# Patient Record
Sex: MALE | Race: WHITE | NOT HISPANIC OR LATINO | Employment: FULL TIME | URBAN - METROPOLITAN AREA
[De-identification: names, ages, dates, MRNs, and addresses within clinical notes are randomized per-mention and may not be internally consistent; named-entity substitution may affect disease eponyms.]

---

## 2023-08-07 LAB
ALANINE AMINOTRANSFERASE (SGPT) (U/L) IN SER/PLAS: 16 U/L (ref 10–52)
ALBUMIN (G/DL) IN SER/PLAS: 4.4 G/DL (ref 3.4–5)
ALKALINE PHOSPHATASE (U/L) IN SER/PLAS: 48 U/L (ref 33–136)
ANION GAP IN SER/PLAS: 12 MMOL/L (ref 10–20)
ASPARTATE AMINOTRANSFERASE (SGOT) (U/L) IN SER/PLAS: 16 U/L (ref 9–39)
BILIRUBIN TOTAL (MG/DL) IN SER/PLAS: 0.5 MG/DL (ref 0–1.2)
CALCIUM (MG/DL) IN SER/PLAS: 9.4 MG/DL (ref 8.6–10.3)
CARBON DIOXIDE, TOTAL (MMOL/L) IN SER/PLAS: 27 MMOL/L (ref 21–32)
CHLORIDE (MMOL/L) IN SER/PLAS: 102 MMOL/L (ref 98–107)
CHOLESTEROL (MG/DL) IN SER/PLAS: 137 MG/DL (ref 0–199)
CHOLESTEROL IN HDL (MG/DL) IN SER/PLAS: 38.2 MG/DL
CHOLESTEROL/HDL RATIO: 3.6
CREATININE (MG/DL) IN SER/PLAS: 0.84 MG/DL (ref 0.5–1.3)
ERYTHROCYTE DISTRIBUTION WIDTH (RATIO) BY AUTOMATED COUNT: 12 % (ref 11.5–14.5)
ERYTHROCYTE MEAN CORPUSCULAR HEMOGLOBIN CONCENTRATION (G/DL) BY AUTOMATED: 32.1 G/DL (ref 32–36)
ERYTHROCYTE MEAN CORPUSCULAR VOLUME (FL) BY AUTOMATED COUNT: 95 FL (ref 80–100)
ERYTHROCYTES (10*6/UL) IN BLOOD BY AUTOMATED COUNT: 4.29 X10E12/L (ref 4.5–5.9)
GFR MALE: >90 ML/MIN/1.73M2
GLUCOSE (MG/DL) IN SER/PLAS: 150 MG/DL (ref 74–99)
HEMATOCRIT (%) IN BLOOD BY AUTOMATED COUNT: 40.8 % (ref 41–52)
HEMOGLOBIN (G/DL) IN BLOOD: 13.1 G/DL (ref 13.5–17.5)
LDL: 84 MG/DL (ref 0–99)
LEUKOCYTES (10*3/UL) IN BLOOD BY AUTOMATED COUNT: 6 X10E9/L (ref 4.4–11.3)
NRBC (PER 100 WBCS) BY AUTOMATED COUNT: 0 /100 WBC (ref 0–0)
PLATELETS (10*3/UL) IN BLOOD AUTOMATED COUNT: 243 X10E9/L (ref 150–450)
POTASSIUM (MMOL/L) IN SER/PLAS: 4.1 MMOL/L (ref 3.5–5.3)
PROSTATE SPECIFIC AG (NG/ML) IN SER/PLAS: 0.38 NG/ML (ref 0–4)
PROTEIN TOTAL: 7.1 G/DL (ref 6.4–8.2)
SODIUM (MMOL/L) IN SER/PLAS: 137 MMOL/L (ref 136–145)
THYROTROPIN (MIU/L) IN SER/PLAS BY DETECTION LIMIT <= 0.05 MIU/L: 2.98 MIU/L (ref 0.44–3.98)
TRIGLYCERIDE (MG/DL) IN SER/PLAS: 75 MG/DL (ref 0–149)
UREA NITROGEN (MG/DL) IN SER/PLAS: 22 MG/DL (ref 6–23)
VLDL: 15 MG/DL (ref 0–40)

## 2023-10-30 DIAGNOSIS — I65.23 BILATERAL CAROTID ARTERY STENOSIS: ICD-10-CM

## 2023-11-14 ENCOUNTER — APPOINTMENT (OUTPATIENT)
Dept: RADIOLOGY | Facility: CLINIC | Age: 73
End: 2023-11-14
Payer: COMMERCIAL

## 2023-11-14 ENCOUNTER — HOSPITAL ENCOUNTER (OUTPATIENT)
Dept: VASCULAR MEDICINE | Facility: HOSPITAL | Age: 73
Discharge: HOME | End: 2023-11-14
Payer: COMMERCIAL

## 2023-11-14 DIAGNOSIS — I65.22 OCCLUSION AND STENOSIS OF LEFT CAROTID ARTERY: ICD-10-CM

## 2023-11-14 DIAGNOSIS — I65.23 BILATERAL CAROTID ARTERY STENOSIS: ICD-10-CM

## 2023-11-14 PROCEDURE — 93880 EXTRACRANIAL BILAT STUDY: CPT | Performed by: SURGERY

## 2023-11-14 PROCEDURE — 93880 EXTRACRANIAL BILAT STUDY: CPT

## 2023-11-21 ENCOUNTER — OFFICE VISIT (OUTPATIENT)
Dept: VASCULAR SURGERY | Facility: CLINIC | Age: 73
End: 2023-11-21
Payer: COMMERCIAL

## 2023-11-21 VITALS — HEIGHT: 70 IN | HEART RATE: 76 BPM | BODY MASS INDEX: 27.2 KG/M2 | WEIGHT: 190 LBS | OXYGEN SATURATION: 95 %

## 2023-11-21 DIAGNOSIS — I65.23 BILATERAL CAROTID ARTERY STENOSIS: ICD-10-CM

## 2023-11-21 PROCEDURE — 1159F MED LIST DOCD IN RCRD: CPT | Performed by: SURGERY

## 2023-11-21 PROCEDURE — 1160F RVW MEDS BY RX/DR IN RCRD: CPT | Performed by: SURGERY

## 2023-11-21 PROCEDURE — 1036F TOBACCO NON-USER: CPT | Performed by: SURGERY

## 2023-11-21 PROCEDURE — 99213 OFFICE O/P EST LOW 20 MIN: CPT | Performed by: SURGERY

## 2023-11-21 RX ORDER — CLINDAMYCIN HYDROCHLORIDE 150 MG/1
450 CAPSULE ORAL 3 TIMES DAILY
COMMUNITY
Start: 2023-06-10

## 2023-11-21 RX ORDER — METFORMIN HYDROCHLORIDE 500 MG/1
500 TABLET ORAL 2 TIMES DAILY
COMMUNITY

## 2023-11-21 RX ORDER — FLUTICASONE PROPIONATE 50 MCG
2 SPRAY, SUSPENSION (ML) NASAL DAILY
COMMUNITY
Start: 2023-06-10

## 2023-11-21 RX ORDER — CEFUROXIME AXETIL 250 MG/1
250 TABLET ORAL 2 TIMES DAILY
COMMUNITY
Start: 2023-02-14

## 2023-11-21 RX ORDER — AMMONIUM LACTATE 12 G/100G
LOTION TOPICAL 2 TIMES DAILY
COMMUNITY
Start: 2023-09-16

## 2023-11-21 RX ORDER — NAPROXEN SODIUM 220 MG/1
1 TABLET, FILM COATED ORAL DAILY
COMMUNITY
Start: 2021-12-18

## 2023-11-21 RX ORDER — SERTRALINE HYDROCHLORIDE 25 MG/1
TABLET, FILM COATED ORAL
COMMUNITY

## 2023-11-21 RX ORDER — DOCUSATE SODIUM 100 MG/1
100 CAPSULE, LIQUID FILLED ORAL DAILY
COMMUNITY

## 2023-11-21 RX ORDER — ATORVASTATIN CALCIUM 40 MG/1
40 TABLET, FILM COATED ORAL DAILY
COMMUNITY

## 2023-11-21 RX ORDER — CLOPIDOGREL BISULFATE 75 MG/1
75 TABLET ORAL DAILY
COMMUNITY

## 2023-11-21 RX ORDER — SERTRALINE HYDROCHLORIDE 100 MG/1
100 TABLET, FILM COATED ORAL DAILY
COMMUNITY
Start: 2023-11-16

## 2023-11-21 RX ORDER — LISINOPRIL 20 MG/1
20 TABLET ORAL DAILY
COMMUNITY
Start: 2023-11-04

## 2023-11-21 RX ORDER — HYDRALAZINE HYDROCHLORIDE 50 MG/1
50 TABLET, FILM COATED ORAL 2 TIMES DAILY
COMMUNITY

## 2023-11-21 ASSESSMENT — ENCOUNTER SYMPTOMS
GASTROINTESTINAL NEGATIVE: 1
MUSCULOSKELETAL NEGATIVE: 1
CARDIOVASCULAR NEGATIVE: 1
HEMATOLOGIC/LYMPHATIC NEGATIVE: 1
ALLERGIC/IMMUNOLOGIC NEGATIVE: 1
CONSTITUTIONAL NEGATIVE: 1
ENDOCRINE NEGATIVE: 1
RESPIRATORY NEGATIVE: 1
PSYCHIATRIC NEGATIVE: 1
NEUROLOGICAL NEGATIVE: 1

## 2023-11-21 NOTE — PROGRESS NOTES
History Of Present Illness  Modesto Lorenz is a 73 y.o. male presenting for carotid follow-up.  He denies any lateralizing symptoms states that he has been doing well.  Recent carotid duplex reveals less than 50% carotid stenosis bilaterally.     Past Medical History  He has no past medical history on file.    Surgical History  He has a past surgical history that includes Other surgical history (12/23/2021); CT angio neck (12/14/2021); CT angio head w and wo IV contrast (12/14/2021); MR angio head wo IV contrast (12/13/2021); and MR angio neck wo IV contrast (12/13/2021).     Social History  He reports that he quit smoking about 2 years ago. His smoking use included cigarettes. He has quit using smokeless tobacco. No history on file for alcohol use and drug use.    Family History  No family history on file.     Allergies  Patient has no known allergies.    Review of Systems   Constitutional: Negative.    HENT: Negative.     Respiratory: Negative.     Cardiovascular: Negative.    Gastrointestinal: Negative.    Endocrine: Negative.    Genitourinary: Negative.    Musculoskeletal: Negative.    Allergic/Immunologic: Negative.    Neurological: Negative.    Hematological: Negative.    Psychiatric/Behavioral: Negative.          Physical Exam  Vitals reviewed.   Constitutional:       General: He is not in acute distress.     Appearance: Normal appearance. He is normal weight.   HENT:      Head: Normocephalic and atraumatic.   Eyes:      Extraocular Movements: Extraocular movements intact.      Conjunctiva/sclera: Conjunctivae normal.      Pupils: Pupils are equal, round, and reactive to light.   Neck:      Vascular: No carotid bruit.   Cardiovascular:      Rate and Rhythm: Normal rate and regular rhythm.      Pulses: Normal pulses.      Heart sounds: Normal heart sounds.   Pulmonary:      Effort: Pulmonary effort is normal.      Breath sounds: Normal breath sounds.   Abdominal:      General: Abdomen is flat. Bowel sounds are  "normal.      Palpations: Abdomen is soft.   Musculoskeletal:         General: No swelling or tenderness. Normal range of motion.      Cervical back: Normal range of motion and neck supple. No tenderness.      Right lower le+ Edema present.      Left lower le+ Edema present.   Skin:     General: Skin is warm and dry.      Capillary Refill: Capillary refill takes less than 2 seconds.   Neurological:      General: No focal deficit present.      Mental Status: He is alert and oriented to person, place, and time.      Cranial Nerves: No cranial nerve deficit.      Sensory: No sensory deficit.      Motor: No weakness.   Psychiatric:         Mood and Affect: Mood normal.         Behavior: Behavior normal.          Last Recorded Vitals  Pulse 76, height 1.778 m (5' 10\"), weight 86.2 kg (190 lb), SpO2 95 %.    Relevant Results      Current Outpatient Medications:     ammonium lactate (Lac-Hydrin) 12 % lotion, Apply topically 2 times a day. to affected area, Disp: , Rfl:     aspirin 81 mg chewable tablet, Chew 1 tablet (81 mg) once daily., Disp: , Rfl:     cefuroxime (Ceftin) 250 mg tablet, Take 1 tablet (250 mg) by mouth 2 times a day., Disp: , Rfl:     clindamycin (Cleocin) 150 mg capsule, Take 3 capsules (450 mg) by mouth 3 times a day., Disp: , Rfl:     fluticasone (Flonase) 50 mcg/actuation nasal spray, Administer 2 sprays into each nostril once daily., Disp: , Rfl:     lisinopril 20 mg tablet, Take 1 tablet (20 mg) by mouth once daily., Disp: , Rfl:     sertraline (Zoloft) 100 mg tablet, Take 1 tablet (100 mg) by mouth once daily., Disp: , Rfl:     atorvastatin (Lipitor) 40 mg tablet, Take 1 tablet (40 mg) by mouth once daily., Disp: , Rfl:     clopidogrel (Plavix) 75 mg tablet, Take 1 tablet (75 mg) by mouth once daily., Disp: , Rfl:     docusate sodium (Colace) 100 mg capsule, Take 1 capsule (100 mg) by mouth once daily., Disp: , Rfl:     hydrALAZINE (Apresoline) 50 mg tablet, Take 1 tablet (50 mg) by mouth 2 " times a day., Disp: , Rfl:     metFORMIN (Glucophage) 500 mg tablet, Take 1 tablet (500 mg) by mouth 2 times a day., Disp: , Rfl:     sertraline (Zoloft) 25 mg tablet, Take by mouth., Disp: , Rfl:      Vascular US carotid artery duplex bilateral    Result Date: 11/15/2023           [facility] [address] [place], [place] [address] Tel [phone] and Fax [phone]  Vascular Lab Report [facility] US CAROTID ARTERY DUPLEX BILATERAL  Patient Name:      TANIA Angelo Physician:  [id number] Stefani Tompkins MD, RPVI Study Date:        11/14/2023           Ordering Physician: [id number] TRACI HURTADO MRN/PID:           [id number]             Technologist:       Diana Lim RVT Accession#:        [id number]         Technologist 2: Date of Birth/Age: 1950 / 73 years Encounter#:         [id number] Gender:            M Admission Status:  Outpatient           Location Performed: [facility]  Diagnosis/ICD: Occlusion and stenosis of left carotid artery-I65.22 CPT Codes:     95790 Cerebrovascular Carotid Duplex scan complete  Patient History Patient underwent Left CEA.  CONCLUSIONS: Right Carotid: Findings are consistent with less than 50% stenosis of the right proximal internal carotid artery. Laminar flow seen by color Doppler. Right external carotid artery appears patent with no evidence of stenosis. No evidence of hemodynamically significant stenosis of the right common carotid artery. The right vertebral artery is patent with antegrade flow. No evidence of hemodynamically significant stenosis in the right subclavian artery. Left Carotid: Findings are consistent with less than 50% stenosis of the left proximal internal carotid artery. Laminar flow seen by color Doppler. Left external carotid artery appears patent with no evidence of stenosis. No evidence of hemodynamically  significant stenosis of the left common carotid artery. The left vertebral artery is patent with antegrade flow. No evidence of hemodynamically significant stenosis in the left subclavian artery. Endarterectomy: Patent left carotid endarterectomy site following endarterectomy.  Comparison: Compared with study from 2/23/2023, no significant change.  Imaging & Doppler Findings: Right Plaque Morph: The proximal right internal carotid artery demonstrates heterogenous plaque. The distal right common carotid artery demonstrates heterogenous plaque. Left Plaque Morph: The proximal left internal carotid artery demonstrates intimal thickening plaque. The distal left common carotid artery demonstrates intimal thickening and heterogenous plaque.   Right                        Left   PSV      EDV                PSV      EDV 89 cm/s            CCA P    60 cm/s 73 cm/s            CCA D    72 cm/s 55 cm/s  13 cm/s   ICA P    67 cm/s  13 cm/s 63 cm/s  19 cm/s   ICA M    50 cm/s  16 cm/s 69 cm/s  25 cm/s   ICA D    74 cm/s  23 cm/s 127 cm/s            ECA     158 cm/s 108 cm/s         Vertebral  23 cm/s 202 cm/s         Subclavian 172 cm/s               Right Left ICA/CCA Ratio  0.8  0.9   [id number] Stefani Tompkins MD, RPVI Electronically signed by [id number] Stefani Tompkins MD, RPSHANE on 11/15/2023 at 10:26:53 PM  ** Final **        Assessment/Plan   Diagnoses and all orders for this visit:  Bilateral carotid artery stenosis      74yo male presenting for carotid follow-up.  He denies lateralizing symptoms and no focal deficits are noted on exam.  Recent carotid duplex study reveals less than 50% stenosis bilaterally.  I have recommended that he continue medical management with aspirin, statin, and Plavix.  He is to follow-up in 1 year with repeat carotid duplex.       I spent 20 minutes in the professional and overall care of this patient.      Sunshine Lofton MD

## 2024-08-10 ENCOUNTER — HOSPITAL ENCOUNTER (EMERGENCY)
Facility: HOSPITAL | Age: 74
Discharge: HOME | End: 2024-08-10
Payer: MEDICARE

## 2024-08-10 ENCOUNTER — APPOINTMENT (OUTPATIENT)
Dept: RADIOLOGY | Facility: HOSPITAL | Age: 74
End: 2024-08-10
Payer: MEDICARE

## 2024-08-10 VITALS
HEIGHT: 70 IN | RESPIRATION RATE: 18 BRPM | HEART RATE: 85 BPM | BODY MASS INDEX: 27.2 KG/M2 | DIASTOLIC BLOOD PRESSURE: 70 MMHG | WEIGHT: 190 LBS | OXYGEN SATURATION: 95 % | SYSTOLIC BLOOD PRESSURE: 139 MMHG | TEMPERATURE: 96.8 F

## 2024-08-10 DIAGNOSIS — L03.90 CELLULITIS, UNSPECIFIED CELLULITIS SITE: Primary | ICD-10-CM

## 2024-08-10 DIAGNOSIS — I80.8 SUPERFICIAL THROMBOPHLEBITIS OF LEFT UPPER EXTREMITY: ICD-10-CM

## 2024-08-10 LAB
ALBUMIN SERPL BCP-MCNC: 4.1 G/DL (ref 3.4–5)
ALP SERPL-CCNC: 65 U/L (ref 33–136)
ALT SERPL W P-5'-P-CCNC: 10 U/L (ref 10–52)
ANION GAP SERPL CALC-SCNC: 13 MMOL/L (ref 10–20)
AST SERPL W P-5'-P-CCNC: 12 U/L (ref 9–39)
BASOPHILS # BLD AUTO: 0.06 X10*3/UL (ref 0–0.1)
BASOPHILS NFR BLD AUTO: 0.7 %
BILIRUB SERPL-MCNC: 0.6 MG/DL (ref 0–1.2)
BUN SERPL-MCNC: 11 MG/DL (ref 6–23)
CALCIUM SERPL-MCNC: 9.6 MG/DL (ref 8.6–10.3)
CHLORIDE SERPL-SCNC: 100 MMOL/L (ref 98–107)
CO2 SERPL-SCNC: 27 MMOL/L (ref 21–32)
CREAT SERPL-MCNC: 0.73 MG/DL (ref 0.5–1.3)
EGFRCR SERPLBLD CKD-EPI 2021: >90 ML/MIN/1.73M*2
EOSINOPHIL # BLD AUTO: 0.3 X10*3/UL (ref 0–0.4)
EOSINOPHIL NFR BLD AUTO: 3.6 %
ERYTHROCYTE [DISTWIDTH] IN BLOOD BY AUTOMATED COUNT: 12.7 % (ref 11.5–14.5)
GLUCOSE SERPL-MCNC: 180 MG/DL (ref 74–99)
HCT VFR BLD AUTO: 39.6 % (ref 41–52)
HGB BLD-MCNC: 13 G/DL (ref 13.5–17.5)
IMM GRANULOCYTES # BLD AUTO: 0.03 X10*3/UL (ref 0–0.5)
IMM GRANULOCYTES NFR BLD AUTO: 0.4 % (ref 0–0.9)
LYMPHOCYTES # BLD AUTO: 1.24 X10*3/UL (ref 0.8–3)
LYMPHOCYTES NFR BLD AUTO: 14.7 %
MCH RBC QN AUTO: 31.6 PG (ref 26–34)
MCHC RBC AUTO-ENTMCNC: 32.8 G/DL (ref 32–36)
MCV RBC AUTO: 96 FL (ref 80–100)
MONOCYTES # BLD AUTO: 0.68 X10*3/UL (ref 0.05–0.8)
MONOCYTES NFR BLD AUTO: 8.1 %
NEUTROPHILS # BLD AUTO: 6.1 X10*3/UL (ref 1.6–5.5)
NEUTROPHILS NFR BLD AUTO: 72.5 %
NRBC BLD-RTO: 0 /100 WBCS (ref 0–0)
PLATELET # BLD AUTO: 272 X10*3/UL (ref 150–450)
POTASSIUM SERPL-SCNC: 4.4 MMOL/L (ref 3.5–5.3)
PROT SERPL-MCNC: 7.5 G/DL (ref 6.4–8.2)
RBC # BLD AUTO: 4.11 X10*6/UL (ref 4.5–5.9)
SODIUM SERPL-SCNC: 136 MMOL/L (ref 136–145)
WBC # BLD AUTO: 8.4 X10*3/UL (ref 4.4–11.3)

## 2024-08-10 PROCEDURE — 80053 COMPREHEN METABOLIC PANEL: CPT | Performed by: PHYSICIAN ASSISTANT

## 2024-08-10 PROCEDURE — 99284 EMERGENCY DEPT VISIT MOD MDM: CPT | Mod: 25

## 2024-08-10 PROCEDURE — 85025 COMPLETE CBC W/AUTO DIFF WBC: CPT | Performed by: PHYSICIAN ASSISTANT

## 2024-08-10 PROCEDURE — 36415 COLL VENOUS BLD VENIPUNCTURE: CPT | Performed by: PHYSICIAN ASSISTANT

## 2024-08-10 PROCEDURE — 93971 EXTREMITY STUDY: CPT | Performed by: STUDENT IN AN ORGANIZED HEALTH CARE EDUCATION/TRAINING PROGRAM

## 2024-08-10 PROCEDURE — 93971 EXTREMITY STUDY: CPT

## 2024-08-10 RX ORDER — CEPHALEXIN 500 MG/1
500 CAPSULE ORAL 4 TIMES DAILY
Qty: 28 CAPSULE | Refills: 0 | Status: SHIPPED | OUTPATIENT
Start: 2024-08-10 | End: 2024-08-17

## 2024-08-10 ASSESSMENT — LIFESTYLE VARIABLES
TOTAL SCORE: 0
HAVE PEOPLE ANNOYED YOU BY CRITICIZING YOUR DRINKING: NO
HAVE YOU EVER FELT YOU SHOULD CUT DOWN ON YOUR DRINKING: NO
EVER HAD A DRINK FIRST THING IN THE MORNING TO STEADY YOUR NERVES TO GET RID OF A HANGOVER: NO
EVER FELT BAD OR GUILTY ABOUT YOUR DRINKING: NO

## 2024-08-10 ASSESSMENT — COLUMBIA-SUICIDE SEVERITY RATING SCALE - C-SSRS
6. HAVE YOU EVER DONE ANYTHING, STARTED TO DO ANYTHING, OR PREPARED TO DO ANYTHING TO END YOUR LIFE?: NO
1. IN THE PAST MONTH, HAVE YOU WISHED YOU WERE DEAD OR WISHED YOU COULD GO TO SLEEP AND NOT WAKE UP?: NO
2. HAVE YOU ACTUALLY HAD ANY THOUGHTS OF KILLING YOURSELF?: NO

## 2024-08-10 NOTE — ED PROVIDER NOTES
HPI   Chief Complaint   Patient presents with    Hand Pain       73-year-old left-hand-dominant male presents complaining of atraumatic pain and swelling to the left upper extremity.  Patient reports symptoms for the past 1 to 2 days.  He also reports some mild erythema associated with the pain and swelling.  He denies any fevers.  He reports full range of motion denies any weakness or paresthesias throughout the left upper extremity.  No reports of recent trauma or fall.              Patient History   No past medical history on file.  Past Surgical History:   Procedure Laterality Date    CT ANGIO NECK  12/14/2021    CT NECK ANGIO W AND WO IV CONTRAST 12/14/2021 [facility] CLINICAL LEGACY    CT HEAD ANGIO W AND WO IV CONTRAST  12/14/2021    CT HEAD ANGIO W AND WO IV CONTRAST 12/14/2021 [facility] CLINICAL LEGACY    MR HEAD ANGIO WO IV CONTRAST  12/13/2021    MR HEAD ANGIO WO IV CONTRAST 12/13/2021 [facility] CLINICAL LEGACY    MR NECK ANGIO WO IV CONTRAST  12/13/2021    MR NECK ANGIO WO IV CONTRAST 12/13/2021 [facility] CLINICAL LEGACY    OTHER SURGICAL HISTORY  12/23/2021    Carotid thromboendarterectomy     No family history on file.  Social History     Tobacco Use    Smoking status: Former     Current packs/day: 0.00     Types: Cigarettes     Quit date: 2021     Years since quitting: 3.6    Smokeless tobacco: Former   Substance Use Topics    Alcohol use: Not on file    Drug use: Not on file       Physical Exam   ED Triage Vitals [08/10/24 1817]   Temperature Heart Rate Respirations BP   36 °C (96.8 °F) 85 18 139/70      Pulse Ox Temp src Heart Rate Source Patient Position   95 % -- -- --      BP Location FiO2 (%)     -- --       Physical Exam  Vitals and nursing note reviewed.   Constitutional:       General: He is not in acute distress.     Appearance: Normal appearance. He is normal weight. He is not ill-appearing, toxic-appearing or diaphoretic.   HENT:      Head: Normocephalic.      Nose: Nose normal.      Mouth/Throat:      Mouth:  Mucous membranes are moist.   Eyes:      Extraocular Movements: Extraocular movements intact.      Conjunctiva/sclera: Conjunctivae normal.   Cardiovascular:      Rate and Rhythm: Normal rate and regular rhythm.      Pulses: Normal pulses.   Pulmonary:      Effort: Pulmonary effort is normal. No respiratory distress.      Breath sounds: Normal breath sounds.   Abdominal:      General: Abdomen is flat. There is no distension.      Palpations: Abdomen is soft.      Tenderness: There is no abdominal tenderness. There is no guarding or rebound.   Musculoskeletal:         General: Normal range of motion.      Cervical back: Normal range of motion and neck supple.      Comments: Asymmetrical swelling to the left upper extremity in comparison to the right.  The patient's left upper extremity is neurovascularly intact throughout with full range of motion soft compartments and easily palpable distal pulses.  Cap refill remains brisk.  Patient has full range of motion throughout the left upper extremity   Skin:     General: Skin is warm and dry.      Capillary Refill: Capillary refill takes less than 2 seconds.      Comments: Erythema associated with the left wrist and forearm.  The erythema itself is not circumferential and is predominantly on the lateral aspect.  It is slightly warm to touch in comparison to the contralateral side.  There is no lymphangitic streaking.  No induration or fluctuance appreciated.   Neurological:      General: No focal deficit present.      Mental Status: He is alert and oriented to person, place, and time.   Psychiatric:         Mood and Affect: Mood normal.         Behavior: Behavior normal.         Thought Content: Thought content normal.         Judgment: Judgment normal.           ED Course & [facility]   ED Course as of 08/10/24 2130   Sat Aug 10, 2024   2130 IMPRESSION:  No evidence of acute DVT left upper extremity.  Basilic vein superficial thrombophlebitis.   [DS]      ED Course User  Index  [DS] Gage Ramirez PA-C                 No data recorded                                 Medical Decision Making  Basic lab work was obtained along with ultrasonography.  Workup revealed no evidence of acute DVT left upper extremity. Basilic vein superficial thrombophlebitis was noted.  Patient failure notified of the findings.  Recommended warm compresses along with Tylenol.  Patient was found to have more encompassing erythema which may be associated with an early cellulitis.  He will be placed on Keflex.  Recommended close follow-up with his primary care doctor          Procedure  Procedures     Gage Ramirez PA-C  08/10/24 0662

## 2024-08-13 ENCOUNTER — HOSPITAL ENCOUNTER (OUTPATIENT)
Dept: RADIOLOGY | Facility: HOSPITAL | Age: 74
Discharge: HOME | End: 2024-08-13
Payer: MEDICARE

## 2024-08-13 DIAGNOSIS — J20.9 ACUTE BRONCHITIS, UNSPECIFIED: ICD-10-CM

## 2024-08-13 PROCEDURE — 71046 X-RAY EXAM CHEST 2 VIEWS: CPT

## 2025-06-04 ENCOUNTER — APPOINTMENT (OUTPATIENT)
Dept: RADIOLOGY | Facility: HOSPITAL | Age: 75
End: 2025-06-04
Payer: MEDICARE

## 2025-06-04 ENCOUNTER — APPOINTMENT (OUTPATIENT)
Dept: CARDIOLOGY | Facility: HOSPITAL | Age: 75
End: 2025-06-04
Payer: MEDICARE

## 2025-06-04 ENCOUNTER — HOSPITAL ENCOUNTER (OUTPATIENT)
Facility: HOSPITAL | Age: 75
Setting detail: OBSERVATION
Discharge: HOME | End: 2025-06-06
Attending: STUDENT IN AN ORGANIZED HEALTH CARE EDUCATION/TRAINING PROGRAM | Admitting: INTERNAL MEDICINE
Payer: MEDICARE

## 2025-06-04 DIAGNOSIS — R47.01 APHASIA: ICD-10-CM

## 2025-06-04 DIAGNOSIS — R73.9 HYPERGLYCEMIA: ICD-10-CM

## 2025-06-04 DIAGNOSIS — E86.0 DEHYDRATION: ICD-10-CM

## 2025-06-04 DIAGNOSIS — E87.1 HYPONATREMIA: Primary | ICD-10-CM

## 2025-06-04 DIAGNOSIS — E87.5 HYPERKALEMIA: ICD-10-CM

## 2025-06-04 DIAGNOSIS — N17.9 AKI (ACUTE KIDNEY INJURY): ICD-10-CM

## 2025-06-04 PROBLEM — R41.82 ALTERED MENTAL STATUS: Status: ACTIVE | Noted: 2025-06-04

## 2025-06-04 LAB
ALBUMIN SERPL BCP-MCNC: 4.4 G/DL (ref 3.4–5)
ALP SERPL-CCNC: 92 U/L (ref 33–136)
ALT SERPL W P-5'-P-CCNC: 16 U/L (ref 10–52)
ANION GAP SERPL CALC-SCNC: 19 MMOL/L (ref 10–20)
AST SERPL W P-5'-P-CCNC: 15 U/L (ref 9–39)
B-OH-BUTYR SERPL-SCNC: 0.23 MMOL/L (ref 0.02–0.27)
BASOPHILS # BLD AUTO: 0.09 X10*3/UL (ref 0–0.1)
BASOPHILS NFR BLD AUTO: 1 %
BILIRUB SERPL-MCNC: 0.6 MG/DL (ref 0–1.2)
BUN SERPL-MCNC: 29 MG/DL (ref 6–23)
CALCIUM SERPL-MCNC: 10.1 MG/DL (ref 8.6–10.3)
CARDIAC TROPONIN I PNL SERPL HS: 16 NG/L (ref 0–20)
CHLORIDE SERPL-SCNC: 92 MMOL/L (ref 98–107)
CO2 SERPL-SCNC: 22 MMOL/L (ref 21–32)
CREAT SERPL-MCNC: 1.72 MG/DL (ref 0.5–1.3)
EGFRCR SERPLBLD CKD-EPI 2021: 41 ML/MIN/1.73M*2
EOSINOPHIL # BLD AUTO: 0.17 X10*3/UL (ref 0–0.4)
EOSINOPHIL NFR BLD AUTO: 1.9 %
ERYTHROCYTE [DISTWIDTH] IN BLOOD BY AUTOMATED COUNT: 12.5 % (ref 11.5–14.5)
GLUCOSE BLD MANUAL STRIP-MCNC: 183 MG/DL (ref 74–99)
GLUCOSE SERPL-MCNC: 290 MG/DL (ref 74–99)
HCT VFR BLD AUTO: 44 % (ref 41–52)
HGB BLD-MCNC: 14.5 G/DL (ref 13.5–17.5)
IMM GRANULOCYTES # BLD AUTO: 0.03 X10*3/UL (ref 0–0.5)
IMM GRANULOCYTES NFR BLD AUTO: 0.3 % (ref 0–0.9)
LYMPHOCYTES # BLD AUTO: 1.37 X10*3/UL (ref 0.8–3)
LYMPHOCYTES NFR BLD AUTO: 15.1 %
MAGNESIUM SERPL-MCNC: 1.74 MG/DL (ref 1.6–2.4)
MCH RBC QN AUTO: 30.7 PG (ref 26–34)
MCHC RBC AUTO-ENTMCNC: 33 G/DL (ref 32–36)
MCV RBC AUTO: 93 FL (ref 80–100)
MONOCYTES # BLD AUTO: 0.82 X10*3/UL (ref 0.05–0.8)
MONOCYTES NFR BLD AUTO: 9.1 %
NEUTROPHILS # BLD AUTO: 6.57 X10*3/UL (ref 1.6–5.5)
NEUTROPHILS NFR BLD AUTO: 72.6 %
NRBC BLD-RTO: 0 /100 WBCS (ref 0–0)
PLATELET # BLD AUTO: 323 X10*3/UL (ref 150–450)
POTASSIUM SERPL-SCNC: 5.4 MMOL/L (ref 3.5–5.3)
PROT SERPL-MCNC: 7.4 G/DL (ref 6.4–8.2)
RBC # BLD AUTO: 4.72 X10*6/UL (ref 4.5–5.9)
SODIUM SERPL-SCNC: 128 MMOL/L (ref 136–145)
WBC # BLD AUTO: 9.1 X10*3/UL (ref 4.4–11.3)

## 2025-06-04 PROCEDURE — 96361 HYDRATE IV INFUSION ADD-ON: CPT

## 2025-06-04 PROCEDURE — 71045 X-RAY EXAM CHEST 1 VIEW: CPT | Mod: FOREIGN READ | Performed by: RADIOLOGY

## 2025-06-04 PROCEDURE — 36415 COLL VENOUS BLD VENIPUNCTURE: CPT | Performed by: STUDENT IN AN ORGANIZED HEALTH CARE EDUCATION/TRAINING PROGRAM

## 2025-06-04 PROCEDURE — 93005 ELECTROCARDIOGRAM TRACING: CPT

## 2025-06-04 PROCEDURE — 80053 COMPREHEN METABOLIC PANEL: CPT | Performed by: STUDENT IN AN ORGANIZED HEALTH CARE EDUCATION/TRAINING PROGRAM

## 2025-06-04 PROCEDURE — G0378 HOSPITAL OBSERVATION PER HR: HCPCS

## 2025-06-04 PROCEDURE — 96360 HYDRATION IV INFUSION INIT: CPT | Mod: 59

## 2025-06-04 PROCEDURE — 82947 ASSAY GLUCOSE BLOOD QUANT: CPT | Performed by: STUDENT IN AN ORGANIZED HEALTH CARE EDUCATION/TRAINING PROGRAM

## 2025-06-04 PROCEDURE — 72125 CT NECK SPINE W/O DYE: CPT | Performed by: RADIOLOGY

## 2025-06-04 PROCEDURE — 82947 ASSAY GLUCOSE BLOOD QUANT: CPT

## 2025-06-04 PROCEDURE — 96372 THER/PROPH/DIAG INJ SC/IM: CPT

## 2025-06-04 PROCEDURE — 85025 COMPLETE CBC W/AUTO DIFF WBC: CPT | Performed by: STUDENT IN AN ORGANIZED HEALTH CARE EDUCATION/TRAINING PROGRAM

## 2025-06-04 PROCEDURE — 84132 ASSAY OF SERUM POTASSIUM: CPT | Performed by: STUDENT IN AN ORGANIZED HEALTH CARE EDUCATION/TRAINING PROGRAM

## 2025-06-04 PROCEDURE — 70450 CT HEAD/BRAIN W/O DYE: CPT | Performed by: RADIOLOGY

## 2025-06-04 PROCEDURE — 99285 EMERGENCY DEPT VISIT HI MDM: CPT | Mod: 25 | Performed by: STUDENT IN AN ORGANIZED HEALTH CARE EDUCATION/TRAINING PROGRAM

## 2025-06-04 PROCEDURE — 72125 CT NECK SPINE W/O DYE: CPT

## 2025-06-04 PROCEDURE — 70450 CT HEAD/BRAIN W/O DYE: CPT

## 2025-06-04 PROCEDURE — 2500000004 HC RX 250 GENERAL PHARMACY W/ HCPCS (ALT 636 FOR OP/ED): Performed by: STUDENT IN AN ORGANIZED HEALTH CARE EDUCATION/TRAINING PROGRAM

## 2025-06-04 PROCEDURE — 83735 ASSAY OF MAGNESIUM: CPT | Performed by: STUDENT IN AN ORGANIZED HEALTH CARE EDUCATION/TRAINING PROGRAM

## 2025-06-04 PROCEDURE — 2500000004 HC RX 250 GENERAL PHARMACY W/ HCPCS (ALT 636 FOR OP/ED)

## 2025-06-04 PROCEDURE — 99223 1ST HOSP IP/OBS HIGH 75: CPT

## 2025-06-04 PROCEDURE — 2500000001 HC RX 250 WO HCPCS SELF ADMINISTERED DRUGS (ALT 637 FOR MEDICARE OP)

## 2025-06-04 PROCEDURE — 71045 X-RAY EXAM CHEST 1 VIEW: CPT

## 2025-06-04 PROCEDURE — 82010 KETONE BODYS QUAN: CPT | Performed by: STUDENT IN AN ORGANIZED HEALTH CARE EDUCATION/TRAINING PROGRAM

## 2025-06-04 PROCEDURE — 84484 ASSAY OF TROPONIN QUANT: CPT | Performed by: STUDENT IN AN ORGANIZED HEALTH CARE EDUCATION/TRAINING PROGRAM

## 2025-06-04 RX ORDER — DULOXETIN HYDROCHLORIDE 60 MG/1
60 CAPSULE, DELAYED RELEASE ORAL DAILY
COMMUNITY

## 2025-06-04 RX ORDER — POLYETHYLENE GLYCOL 3350 17 G/17G
17 POWDER, FOR SOLUTION ORAL DAILY
Status: DISCONTINUED | OUTPATIENT
Start: 2025-06-05 | End: 2025-06-06 | Stop reason: HOSPADM

## 2025-06-04 RX ORDER — FUROSEMIDE 20 MG/1
20 TABLET ORAL DAILY
COMMUNITY
End: 2025-06-06 | Stop reason: HOSPADM

## 2025-06-04 RX ORDER — ASPIRIN 81 MG/1
81 TABLET ORAL DAILY
Status: DISCONTINUED | OUTPATIENT
Start: 2025-06-05 | End: 2025-06-06 | Stop reason: HOSPADM

## 2025-06-04 RX ORDER — ACETAMINOPHEN 500 MG
5 TABLET ORAL ONCE
Status: COMPLETED | OUTPATIENT
Start: 2025-06-04 | End: 2025-06-04

## 2025-06-04 RX ORDER — SODIUM CHLORIDE, SODIUM LACTATE, POTASSIUM CHLORIDE, CALCIUM CHLORIDE 600; 310; 30; 20 MG/100ML; MG/100ML; MG/100ML; MG/100ML
75 INJECTION, SOLUTION INTRAVENOUS CONTINUOUS
Status: ACTIVE | OUTPATIENT
Start: 2025-06-04 | End: 2025-06-05

## 2025-06-04 RX ORDER — INSULIN LISPRO 100 [IU]/ML
0-10 INJECTION, SOLUTION INTRAVENOUS; SUBCUTANEOUS
Status: DISCONTINUED | OUTPATIENT
Start: 2025-06-05 | End: 2025-06-06 | Stop reason: HOSPADM

## 2025-06-04 RX ORDER — CLOPIDOGREL BISULFATE 75 MG/1
75 TABLET ORAL DAILY
Status: DISCONTINUED | OUTPATIENT
Start: 2025-06-05 | End: 2025-06-06 | Stop reason: HOSPADM

## 2025-06-04 RX ORDER — HEPARIN SODIUM 5000 [USP'U]/ML
5000 INJECTION, SOLUTION INTRAVENOUS; SUBCUTANEOUS EVERY 8 HOURS SCHEDULED
Status: DISCONTINUED | OUTPATIENT
Start: 2025-06-04 | End: 2025-06-06 | Stop reason: HOSPADM

## 2025-06-04 RX ORDER — ATORVASTATIN CALCIUM 40 MG/1
40 TABLET, FILM COATED ORAL NIGHTLY
Status: DISCONTINUED | OUTPATIENT
Start: 2025-06-04 | End: 2025-06-05

## 2025-06-04 RX ORDER — LABETALOL HYDROCHLORIDE 5 MG/ML
10 INJECTION, SOLUTION INTRAVENOUS EVERY 10 MIN PRN
Status: DISCONTINUED | OUTPATIENT
Start: 2025-06-04 | End: 2025-06-06 | Stop reason: HOSPADM

## 2025-06-04 RX ORDER — ACETAMINOPHEN 325 MG/1
650 TABLET ORAL EVERY 4 HOURS PRN
Status: DISCONTINUED | OUTPATIENT
Start: 2025-06-04 | End: 2025-06-06 | Stop reason: HOSPADM

## 2025-06-04 RX ORDER — DEXTROSE 50 % IN WATER (D50W) INTRAVENOUS SYRINGE
12.5
Status: DISCONTINUED | OUTPATIENT
Start: 2025-06-04 | End: 2025-06-06 | Stop reason: HOSPADM

## 2025-06-04 RX ORDER — DEXTROSE 50 % IN WATER (D50W) INTRAVENOUS SYRINGE
25
Status: DISCONTINUED | OUTPATIENT
Start: 2025-06-04 | End: 2025-06-06 | Stop reason: HOSPADM

## 2025-06-04 RX ADMIN — ATORVASTATIN CALCIUM 40 MG: 40 TABLET, FILM COATED ORAL at 22:15

## 2025-06-04 RX ADMIN — HEPARIN SODIUM 5000 UNITS: 5000 INJECTION, SOLUTION INTRAVENOUS; SUBCUTANEOUS at 22:15

## 2025-06-04 RX ADMIN — Medication 5 MG: at 23:13

## 2025-06-04 RX ADMIN — SODIUM CHLORIDE, SODIUM LACTATE, POTASSIUM CHLORIDE, AND CALCIUM CHLORIDE 1000 ML: .6; .31; .03; .02 INJECTION, SOLUTION INTRAVENOUS at 18:56

## 2025-06-04 RX ADMIN — SODIUM CHLORIDE, SODIUM LACTATE, POTASSIUM CHLORIDE, AND CALCIUM CHLORIDE 1000 ML: .6; .31; .03; .02 INJECTION, SOLUTION INTRAVENOUS at 19:18

## 2025-06-04 RX ADMIN — SODIUM CHLORIDE, SODIUM LACTATE, POTASSIUM CHLORIDE, AND CALCIUM CHLORIDE 75 ML/HR: .6; .31; .03; .02 INJECTION, SOLUTION INTRAVENOUS at 22:19

## 2025-06-04 SDOH — SOCIAL STABILITY: SOCIAL INSECURITY: HAVE YOU HAD THOUGHTS OF HARMING ANYONE ELSE?: NO

## 2025-06-04 SDOH — SOCIAL STABILITY: SOCIAL INSECURITY: DO YOU FEEL ANYONE HAS EXPLOITED OR TAKEN ADVANTAGE OF YOU FINANCIALLY OR OF YOUR PERSONAL PROPERTY?: NO

## 2025-06-04 SDOH — SOCIAL STABILITY: SOCIAL INSECURITY
WITHIN THE LAST YEAR, HAVE YOU BEEN KICKED, HIT, SLAPPED, OR OTHERWISE PHYSICALLY HURT BY YOUR PARTNER OR EX-PARTNER?: NO

## 2025-06-04 SDOH — SOCIAL STABILITY: SOCIAL INSECURITY: ARE YOU OR HAVE YOU BEEN THREATENED OR ABUSED PHYSICALLY, EMOTIONALLY, OR SEXUALLY BY ANYONE?: NO

## 2025-06-04 SDOH — ECONOMIC STABILITY: INCOME INSECURITY: IN THE PAST 12 MONTHS HAS THE ELECTRIC, GAS, OIL, OR WATER COMPANY THREATENED TO SHUT OFF SERVICES IN YOUR HOME?: NO

## 2025-06-04 SDOH — SOCIAL STABILITY: SOCIAL INSECURITY: WITHIN THE LAST YEAR, HAVE YOU BEEN HUMILIATED OR EMOTIONALLY ABUSED IN OTHER WAYS BY YOUR PARTNER OR EX-PARTNER?: NO

## 2025-06-04 SDOH — SOCIAL STABILITY: SOCIAL INSECURITY: WITHIN THE LAST YEAR, HAVE YOU BEEN AFRAID OF YOUR PARTNER OR EX-PARTNER?: NO

## 2025-06-04 SDOH — ECONOMIC STABILITY: FOOD INSECURITY: WITHIN THE PAST 12 MONTHS, YOU WORRIED THAT YOUR FOOD WOULD RUN OUT BEFORE YOU GOT THE MONEY TO BUY MORE.: NEVER TRUE

## 2025-06-04 SDOH — SOCIAL STABILITY: SOCIAL INSECURITY
WITHIN THE LAST YEAR, HAVE YOU BEEN RAPED OR FORCED TO HAVE ANY KIND OF SEXUAL ACTIVITY BY YOUR PARTNER OR EX-PARTNER?: NO

## 2025-06-04 SDOH — SOCIAL STABILITY: SOCIAL INSECURITY: ARE THERE ANY APPARENT SIGNS OF INJURIES/BEHAVIORS THAT COULD BE RELATED TO ABUSE/NEGLECT?: NO

## 2025-06-04 SDOH — SOCIAL STABILITY: SOCIAL INSECURITY: HAVE YOU HAD ANY THOUGHTS OF HARMING ANYONE ELSE?: NO

## 2025-06-04 SDOH — SOCIAL STABILITY: SOCIAL INSECURITY: HAS ANYONE EVER THREATENED TO HURT YOUR FAMILY OR YOUR PETS?: NO

## 2025-06-04 SDOH — ECONOMIC STABILITY: FOOD INSECURITY: WITHIN THE PAST 12 MONTHS, THE FOOD YOU BOUGHT JUST DIDN'T LAST AND YOU DIDN'T HAVE MONEY TO GET MORE.: NEVER TRUE

## 2025-06-04 SDOH — SOCIAL STABILITY: SOCIAL INSECURITY: DO YOU FEEL UNSAFE GOING BACK TO THE PLACE WHERE YOU ARE LIVING?: NO

## 2025-06-04 SDOH — SOCIAL STABILITY: SOCIAL INSECURITY: WERE YOU ABLE TO COMPLETE ALL THE BEHAVIORAL HEALTH SCREENINGS?: YES

## 2025-06-04 SDOH — SOCIAL STABILITY: SOCIAL INSECURITY: DOES ANYONE TRY TO KEEP YOU FROM HAVING/CONTACTING OTHER FRIENDS OR DOING THINGS OUTSIDE YOUR HOME?: NO

## 2025-06-04 SDOH — SOCIAL STABILITY: SOCIAL INSECURITY: ABUSE: ADULT

## 2025-06-04 ASSESSMENT — PATIENT HEALTH QUESTIONNAIRE - PHQ9
SUM OF ALL RESPONSES TO PHQ9 QUESTIONS 1 & 2: 0
2. FEELING DOWN, DEPRESSED OR HOPELESS: NOT AT ALL
1. LITTLE INTEREST OR PLEASURE IN DOING THINGS: NOT AT ALL

## 2025-06-04 ASSESSMENT — COGNITIVE AND FUNCTIONAL STATUS - GENERAL
MOBILITY SCORE: 23
DAILY ACTIVITIY SCORE: 24
CLIMB 3 TO 5 STEPS WITH RAILING: A LITTLE
PATIENT BASELINE BEDBOUND: NO

## 2025-06-04 ASSESSMENT — ACTIVITIES OF DAILY LIVING (ADL)
DRESSING YOURSELF: INDEPENDENT
PATIENT'S MEMORY ADEQUATE TO SAFELY COMPLETE DAILY ACTIVITIES?: YES
JUDGMENT_ADEQUATE_SAFELY_COMPLETE_DAILY_ACTIVITIES: YES
GROOMING: INDEPENDENT
HEARING - LEFT EAR: DIFFICULTY WITH NOISE
WALKS IN HOME: INDEPENDENT
HEARING - RIGHT EAR: DIFFICULTY WITH NOISE
TOILETING: INDEPENDENT
BATHING: INDEPENDENT
FEEDING YOURSELF: INDEPENDENT
LACK_OF_TRANSPORTATION: NO
ADEQUATE_TO_COMPLETE_ADL: YES

## 2025-06-04 ASSESSMENT — LIFESTYLE VARIABLES
AUDIT-C TOTAL SCORE: 0
AUDIT-C TOTAL SCORE: 0
SKIP TO QUESTIONS 9-10: 1
HOW MANY STANDARD DRINKS CONTAINING ALCOHOL DO YOU HAVE ON A TYPICAL DAY: PATIENT DOES NOT DRINK
HOW OFTEN DO YOU HAVE 6 OR MORE DRINKS ON ONE OCCASION: NEVER
HOW OFTEN DO YOU HAVE A DRINK CONTAINING ALCOHOL: NEVER

## 2025-06-04 ASSESSMENT — PAIN - FUNCTIONAL ASSESSMENT: PAIN_FUNCTIONAL_ASSESSMENT: 0-10

## 2025-06-04 ASSESSMENT — PAIN SCALES - GENERAL: PAINLEVEL_OUTOF10: 0 - NO PAIN

## 2025-06-04 NOTE — ED PROVIDER NOTES
EMERGENCY DEPARTMENT ENCOUNTER      Pt Name: Modesto Lorenz  MRN: [id number]  Birthdate 1950  Date of evaluation: 6/4/2025  Provider: Reggie Orellana DO    CHIEF COMPLAINT       Chief Complaint   Patient presents with    Hyperglycemia       HISTORY OF PRESENT ILLNESS    Modesto Lorenz is a 74 y.o. male who presents to the emergency department with EMS due to altered mental status found outside.  Patient reports that he was working doing yard work while in the heat.  He states he was trying to stay hydrated as best he could.  Reportedly spouse called EMS as patient seemed to be confused.  Patient is alert and oriented to person time place at this time.  He does not recall the events between working outside and EMS arrival.  en route patient was found to be hyperglycemic with glucose in the high 300s.  He does have a history of diabetes.  Has been compliant with all of his medications.  He has dried blood under his chin although does not know where this came from.  No further related symptoms at this time.      .    Nursing Notes were reviewed.    REVIEW OF SYSTEMS     CONSTITUTIONAL: Denies fever, sweats, chills.   NEURO: Endorses confusion.  Denies difficulty walking, numbness, weakness, tingling, headache.   HEENT: Denies sore throat, rhinorrhea, changes in vision.   CARDIO: Denies chest pain, palpitations.  PULM: Denies shortness of breath, cough.   GI: Denies abdominal pain, nausea, vomiting, diarrhea, constipation, melena, hematochezia.  : Denies painful urination, frequency, hematuria.   MSK: Denies recent trauma.   SKIN: Denies rash, lesions.   ENDOCRINE: Endorses diabetes.  HEME: Denies bleeding disorder.     PAST MEDICAL HISTORY   Medical History[1]    SURGICAL HISTORY     Surgical History[2]    ALLERGIES     Patient has no known allergies.    FAMILY HISTORY     Family History[3]     SOCIAL HISTORY     Social History[4]    PHYSICAL EXAM   VS: As documented in the triage note from today's date and EMR  flowsheet were reviewed.  Gen: Well developed. No acute distress. Seated in bed. Appears nontoxic.  GCS 15.  Skin: Warm. Dry. Intact. No rashes or lesions.  Eyes: Pupils equally round and reactive to light. Clear sclera. EOMI.  HENT: Atraumatic appearance. Mucosal membranes moist. No oral lesions, uvula midline, airway patent.   CV: Regular rate and regular rhythm. S1, S2. No pedal edema. Warm extremities.  Resp: Nonlabored breathing Clear to auscultation bilaterally. No increased work of breathing.   GI: Soft and nontender. No rebound or guarding. Bowel sounds x4 present.   MSK: Symmetric muscle bulk. No joint swelling in the extremities. Compartments are soft. Neurovascularly intact x4 extremities. Radial pulses +2 equal bilaterally.  Pedal pulses +2 equal bilaterally  Neuro: Alert. CN II - XII intact. Speech fluent. Moving all extremities. No focal deficits. Gait normal.  NIH 0.  Van negative.  Psych: Appropriate. Kempt.    DIAGNOSTIC RESULTS   RADIOLOGY:   Non-plain film images such as CT, Ultrasound and MRI are read by the radiologist. Plain radiographic images are visualized and preliminarily interpreted by the emergency physician with the below findings: Chest x-ray no pneumothorax or pneumonia.      Interpretation per the Radiologist below, if available at the time of this note:    CT head wo IV contrast   Final Result   CT HEAD:   1. No acute intracranial abnormality or calvarial fracture.             CT CERVICAL SPINE:   1. No acute fracture or traumatic malalignment of the cervical spine.        MACRO:   None        Signed by: Scout Castillo 6/4/2025 7:23 PM   Dictation workstation:   [id number]      CT cervical spine wo IV contrast   Final Result   CT HEAD:   1. No acute intracranial abnormality or calvarial fracture.             CT CERVICAL SPINE:   1. No acute fracture or traumatic malalignment of the cervical spine.        MACRO:   None        Signed by: Scout Castillo 6/4/2025 7:23 PM   Dictation  workstation:   [id number]      XR chest 1 view   Final Result   No acute process.   Signed by Heri Bowling MD            ED BEDSIDE ULTRASOUND:   Performed by ED Physician - none    LABS:  Labs Reviewed   CBC WITH AUTO DIFFERENTIAL - Abnormal       Result Value    WBC 9.1      nRBC 0.0      RBC 4.72      Hemoglobin 14.5      Hematocrit 44.0      MCV 93      MCH 30.7      MCHC 33.0      RDW 12.5      Platelets 323      Neutrophils % 72.6      Immature Granulocytes %, Automated 0.3      Lymphocytes % 15.1      Monocytes % 9.1      Eosinophils % 1.9      Basophils % 1.0      Neutrophils Absolute 6.57 (*)     Immature Granulocytes Absolute, Automated 0.03      Lymphocytes Absolute 1.37      Monocytes Absolute 0.82 (*)     Eosinophils Absolute 0.17      Basophils Absolute 0.09     COMPREHENSIVE METABOLIC PANEL - Abnormal    Glucose 290 (*)     Sodium 128 (*)     Potassium 5.4 (*)     Chloride 92 (*)     Bicarbonate 22      Anion Gap 19      Urea Nitrogen 29 (*)     Creatinine 1.72 (*)     eGFR 41 (*)     Calcium 10.1      Albumin 4.4      Alkaline Phosphatase 92      Total Protein 7.4      AST 15      Bilirubin, Total 0.6      ALT 16     MAGNESIUM - Normal    Magnesium 1.74     BETA HYDROXYBUTYRATE - Normal    Beta-Hydroxybutyrate 0.23      Narrative:     The beta-hydroxybutyrate test performance characteristics have been validated by  [facility]. This test has not been approved by the FDA; however,such approval is not necessary.       TROPONIN I, HIGH SENSITIVITY - Normal    Troponin I, High Sensitivity 16      Narrative:     Less than 99th percentile of normal range cutoff-  Female and children under 18 years old <14 ng/L; Male <21 ng/L: Negative  Repeat testing should be performed if clinically indicated.     Female and children under 18 years old 14-50 ng/L; Male 21-50 ng/L:  Consistent with possible cardiac damage and possible increased clinical   risk. Serial  measurements may help to assess extent of myocardial damage.     >50 ng/L: Consistent with cardiac damage, increased clinical risk and  myocardial infarction. Serial measurements may help assess extent of   myocardial damage.      NOTE: Children less than 1 year old may have higher baseline troponin   levels and results should be interpreted in conjunction with the overall   clinical context.     NOTE: Troponin I testing is performed using a different   testing methodology at [facility] than at other   [facility]. Direct result comparisons should only   be made within the same method.   URINALYSIS WITH REFLEX CULTURE AND MICROSCOPIC    Narrative:     The following orders were created for panel order Urinalysis with Reflex Culture and Microscopic.  Procedure                               Abnormality         Status                     ---------                               -----------         ------                     Urinalysis with Reflex C...[[id number]]                                                 Extra Urine Gray Tube[[id number]]                                                         Please view results for these tests on the individual orders.   BLOOD GAS VENOUS FULL PANEL   URINALYSIS WITH REFLEX CULTURE AND MICROSCOPIC   EXTRA URINE GRAY TUBE       All other labs were within normal range or not returned as of this dictation.    EMERGENCY DEPARTMENT COURSE/MDM:   Vitals:    Vitals:    06/04/25 1915 06/04/25 1921 06/04/25 2000 06/04/25 2030   BP:  112/59 141/70    Pulse: 82 82 66 82   Resp: 18 18  18   Temp:       TempSrc:       SpO2: 96% 95% 97% 98%   Weight:       Height:           I reviewed the patient's triage vitals and they are hemodynamically stable    Due to the above findings the following was ordered basic labs include EKG CT imaging the head and neck and fluid bolus.    Lab work shows no leukocytosis make infectious etiology less likely no signs of anemia either.  Patient has  multiple electrolyte derangements hyperkalemia hyponatremia LUDA.  I do suspect this is likely secondary to dehydration will give a total of 2 L of LR.  Patient is resting comfortably in medically stable.  CT imaging of the head and neck is negative for any acute injury cranial findings or fracture.  Chest x-ray is clear.  EKG without acute injury pattern no runs of dysrhythmias cardiac troponin within normal range making ACS less likely.  I did have a thorough discussion with the patient's primary physician who is agreed to accept the patient he did take over care at time of admission order.  Patient is appreciative of care agreeable with plan for admission for suspected dehydration.    ED Course as of 06/04/25 2044 Wed Jun 04, 2025 1849 Interpreted by the Emergency Department Attending: ECG revealed normal sinus rhythm first-degree AV block at a rate of 87 beats per minute with AL interval 314 , QRS of 132 , QTc of 428.  No acute injury pattern. Previous EKG on December 12 revealed nonspecific changes.    [MG]      ED Course User Index  [MG] Reggie Orellana DO         Diagnoses as of 06/04/25 2044   Hyponatremia   Hyperkalemia   LUDA (acute kidney injury)       Patient was counseled regarding labs, imaging, likely diagnosis, and plan. All questions were answered.     ------------------------------------------------------------------  Information provided by the patient and spouse  Consults PCP  Past medical history complicating workup diabetes  Previous medical records reviewed office visit 11/21/2023  Considered hyperkalemic cocktail although suspect secondary to dehydration no EKG changes  Shared medical decision making patient agreeable with hospital admission for continued workup and treatment.  ------------------------------------------------------------------  ED Medications administered this visit:    Medications   lactated Ringer's bolus 1,000 mL (0 mL intravenous Stopped 6/4/25 2041)   lactated  Ringer's bolus 1,000 mL (0 mL intravenous Stopped 25)       Final Impression:   1. Hyponatremia    2. Hyperkalemia    3. LUDA (acute kidney injury)          Reggie Orellana DO    (Please note that portions of this note were completed with a voice recognition program.  Efforts were made to edit the dictations but occasionally words are mis-transcribed.)         [1] No past medical history on file.  [2]   Past Surgical History:  Procedure Laterality Date    CT ANGIO NECK  2021    CT NECK ANGIO W AND WO IV CONTRAST 2021 [facility] CLINICAL LEGACY    CT HEAD ANGIO W AND WO IV CONTRAST  2021    CT HEAD ANGIO W AND WO IV CONTRAST 2021 [facility] CLINICAL LEGAC[facility]    MR HEAD ANGIO WO IV CONTRAST  2021    MR HEAD ANGIO WO IV CONTRAST 2021 [facility] CLINICAL LEGACY    MR NECK ANGIO WO IV CONTRAST  2021    MR NECK ANGIO WO IV CONTRAST 2021 [facility] CLINICAL LEGACY    OTHER SURGICAL HISTORY  2021    Carotid thromboendarterectomy   [3] No family history on file.  [4]   Social History  Socioeconomic History    Marital status:    Tobacco Use    Smoking status: Former     Current packs/day: 0.00     Types: Cigarettes     Quit date:      Years since quittin.4    Smokeless tobacco: Former        Reggie Orellana DO  25

## 2025-06-04 NOTE — ED TRIAGE NOTES
Pt to ED via EMS from home due to hyperglycemia. Per EMS, pt has hx of stroke and diabetes and wife was concerned due to pt stuttering at home. Per EMS, blood sugar 375 while en route to [facility].

## 2025-06-05 ENCOUNTER — APPOINTMENT (OUTPATIENT)
Dept: RADIOLOGY | Facility: HOSPITAL | Age: 75
End: 2025-06-05
Payer: MEDICARE

## 2025-06-05 ENCOUNTER — APPOINTMENT (OUTPATIENT)
Dept: CARDIOLOGY | Facility: HOSPITAL | Age: 75
End: 2025-06-05
Payer: MEDICARE

## 2025-06-05 LAB
ANION GAP BLDV CALCULATED.4IONS-SCNC: 16 MMOL/L (ref 10–25)
ANION GAP SERPL CALC-SCNC: 12 MMOL/L (ref 10–20)
AORTIC VALVE MEAN GRADIENT: 2 MMHG
AORTIC VALVE PEAK VELOCITY: 0.92 M/S
APPEARANCE UR: CLEAR
APTT PPP: 30 SECONDS (ref 26–36)
ATRIAL RATE: 87 BPM
AV PEAK GRADIENT: 3 MMHG
AVA (PEAK VEL): 3.45 CM2
AVA (VTI): 2.84 CM2
BASE EXCESS BLDV CALC-SCNC: -0.2 MMOL/L (ref -2–3)
BILIRUB UR STRIP.AUTO-MCNC: NEGATIVE MG/DL
BNP SERPL-MCNC: 45 PG/ML (ref 0–99)
BODY TEMPERATURE: 37 DEGREES CELSIUS
BUN SERPL-MCNC: 26 MG/DL (ref 6–23)
CA-I BLDV-SCNC: 1.14 MMOL/L (ref 1.1–1.33)
CALCIUM SERPL-MCNC: 9.5 MG/DL (ref 8.6–10.3)
CHLORIDE BLDV-SCNC: 92 MMOL/L (ref 98–107)
CHLORIDE SERPL-SCNC: 97 MMOL/L (ref 98–107)
CHLORIDE UR-SCNC: 34 MMOL/L
CHLORIDE/CREATININE (MMOL/G) IN URINE: 102 MMOL/G CREAT (ref 23–275)
CHOLEST SERPL-MCNC: 193 MG/DL (ref 0–199)
CHOLESTEROL/HDL RATIO: 6.1
CO2 SERPL-SCNC: 29 MMOL/L (ref 21–32)
COLOR UR: ABNORMAL
CREAT SERPL-MCNC: 1.21 MG/DL (ref 0.5–1.3)
CREAT UR-MCNC: 33.4 MG/DL (ref 20–370)
CREAT UR-MCNC: 46.7 MG/DL (ref 20–370)
CRITICAL CALL TIME: 1837
CRITICAL CALLED BY: ABNORMAL
CRITICAL CALLED TO: ABNORMAL
CRITICAL READ BACK: ABNORMAL
EGFRCR SERPLBLD CKD-EPI 2021: 63 ML/MIN/1.73M*2
EJECTION FRACTION APICAL 4 CHAMBER: 53
EJECTION FRACTION: 58 %
ERYTHROCYTE [DISTWIDTH] IN BLOOD BY AUTOMATED COUNT: 12.6 % (ref 11.5–14.5)
EST. AVERAGE GLUCOSE BLD GHB EST-MCNC: 292 MG/DL
GLUCOSE BLD MANUAL STRIP-MCNC: 160 MG/DL (ref 74–99)
GLUCOSE BLD MANUAL STRIP-MCNC: 184 MG/DL (ref 74–99)
GLUCOSE BLD MANUAL STRIP-MCNC: 197 MG/DL (ref 74–99)
GLUCOSE BLD MANUAL STRIP-MCNC: 224 MG/DL (ref 74–99)
GLUCOSE BLDV-MCNC: 319 MG/DL (ref 74–99)
GLUCOSE SERPL-MCNC: 200 MG/DL (ref 74–99)
GLUCOSE UR STRIP.AUTO-MCNC: ABNORMAL MG/DL
HBA1C MFR BLD: 11.8 % (ref ?–5.7)
HCO3 BLDV-SCNC: 22.8 MMOL/L (ref 22–26)
HCT VFR BLD AUTO: 39.9 % (ref 41–52)
HCT VFR BLD EST: 45 % (ref 41–52)
HDLC SERPL-MCNC: 31.6 MG/DL
HGB BLD-MCNC: 12.8 G/DL (ref 13.5–17.5)
HGB BLDV-MCNC: 15.1 G/DL (ref 13.5–17.5)
HOLD SPECIMEN: 293
INHALED O2 CONCENTRATION: 21 %
INR PPP: 1 (ref 0.9–1.1)
KETONES UR STRIP.AUTO-MCNC: NEGATIVE MG/DL
LACTATE BLDV-SCNC: 4.6 MMOL/L (ref 0.4–2)
LDLC SERPL CALC-MCNC: 121 MG/DL
LEFT ATRIUM VOLUME AREA LENGTH INDEX BSA: 14.9 ML/M2
LEFT VENTRICLE INTERNAL DIMENSION DIASTOLE: 3.1 CM (ref 3.5–6)
LEFT VENTRICULAR OUTFLOW TRACT DIAMETER: 2.2 CM
LEUKOCYTE ESTERASE UR QL STRIP.AUTO: NEGATIVE
MCH RBC QN AUTO: 30.3 PG (ref 26–34)
MCHC RBC AUTO-ENTMCNC: 32.1 G/DL (ref 32–36)
MCV RBC AUTO: 95 FL (ref 80–100)
MICROALBUMIN UR-MCNC: <7 MG/L
MICROALBUMIN/CREAT UR: NORMAL MG/G{CREAT}
MITRAL VALVE E/A RATIO: 0.67
NITRITE UR QL STRIP.AUTO: NEGATIVE
NON HDL CHOLESTEROL: 161 MG/DL (ref 0–149)
NRBC BLD-RTO: 0 /100 WBCS (ref 0–0)
OSMOLALITY SERPL: 292 MOSM/KG (ref 280–300)
OSMOLALITY UR: 284 MOSM/KG (ref 200–1200)
OXYHGB MFR BLDV: 82.7 % (ref 45–75)
P AXIS: -6 DEGREES
P OFFSET: 127 MS
P ONSET: 54 MS
PCO2 BLDV: 32 MM HG (ref 41–51)
PH BLDV: 7.46 PH (ref 7.33–7.43)
PH UR STRIP.AUTO: 6.5 [PH]
PLATELET # BLD AUTO: 254 X10*3/UL (ref 150–450)
PO2 BLDV: 50 MM HG (ref 35–45)
POTASSIUM BLDV-SCNC: 5.4 MMOL/L (ref 3.5–5.3)
POTASSIUM SERPL-SCNC: 4.5 MMOL/L (ref 3.5–5.3)
POTASSIUM UR-SCNC: 24 MMOL/L
POTASSIUM/CREAT UR-RTO: 72 MMOL/G CREAT
PR INTERVAL: 314 MS
PROT UR STRIP.AUTO-MCNC: NEGATIVE MG/DL
PROTHROMBIN TIME: 11.1 SECONDS (ref 9.8–12.4)
Q ONSET: 211 MS
QRS COUNT: 15 BEATS
QRS DURATION: 132 MS
QT INTERVAL: 356 MS
QTC CALCULATION(BAZETT): 428 MS
QTC FREDERICIA: 402 MS
R AXIS: -72 DEGREES
RBC # BLD AUTO: 4.22 X10*6/UL (ref 4.5–5.9)
RBC # UR STRIP.AUTO: NEGATIVE MG/DL
RIGHT VENTRICLE FREE WALL PEAK S': 12.9 CM/S
SAO2 % BLDV: 85 % (ref 45–75)
SODIUM BLDV-SCNC: 125 MMOL/L (ref 136–145)
SODIUM SERPL-SCNC: 133 MMOL/L (ref 136–145)
SODIUM UR-SCNC: 44 MMOL/L
SODIUM UR-SCNC: 50 MMOL/L
SODIUM/CREAT UR-RTO: 150 MMOL/G CREAT
SODIUM/CREAT UR-RTO: 94 MMOL/G CREAT
SP GR UR STRIP.AUTO: 1.01
T AXIS: -6 DEGREES
T OFFSET: 389 MS
TRICUSPID ANNULAR PLANE SYSTOLIC EXCURSION: 1.8 CM
TRIGL SERPL-MCNC: 202 MG/DL (ref 0–149)
TSH SERPL-ACNC: 1.73 MIU/L (ref 0.44–3.98)
UREA/CREAT UR-SRTO: 11.3 G/G CREAT
UROBILINOGEN UR STRIP.AUTO-MCNC: NORMAL MG/DL
UUN UR-MCNC: 379 MG/DL
VENTRICULAR RATE: 87 BPM
VLDL: 40 MG/DL (ref 0–40)
WBC # BLD AUTO: 5.1 X10*3/UL (ref 4.4–11.3)

## 2025-06-05 PROCEDURE — 83036 HEMOGLOBIN GLYCOSYLATED A1C: CPT | Mod: PARLAB

## 2025-06-05 PROCEDURE — 82374 ASSAY BLOOD CARBON DIOXIDE: CPT

## 2025-06-05 PROCEDURE — 2500000002 HC RX 250 W HCPCS SELF ADMINISTERED DRUGS (ALT 637 FOR MEDICARE OP, ALT 636 FOR OP/ED): Performed by: NURSE PRACTITIONER

## 2025-06-05 PROCEDURE — 2500000002 HC RX 250 W HCPCS SELF ADMINISTERED DRUGS (ALT 637 FOR MEDICARE OP, ALT 636 FOR OP/ED)

## 2025-06-05 PROCEDURE — 83930 ASSAY OF BLOOD OSMOLALITY: CPT | Mod: PARLAB

## 2025-06-05 PROCEDURE — 93306 TTE W/DOPPLER COMPLETE: CPT

## 2025-06-05 PROCEDURE — G0378 HOSPITAL OBSERVATION PER HR: HCPCS

## 2025-06-05 PROCEDURE — 85610 PROTHROMBIN TIME: CPT

## 2025-06-05 PROCEDURE — 36415 COLL VENOUS BLD VENIPUNCTURE: CPT

## 2025-06-05 PROCEDURE — 70547 MR ANGIOGRAPHY NECK W/O DYE: CPT | Performed by: RADIOLOGY

## 2025-06-05 PROCEDURE — 70547 MR ANGIOGRAPHY NECK W/O DYE: CPT

## 2025-06-05 PROCEDURE — 80048 BASIC METABOLIC PNL TOTAL CA: CPT

## 2025-06-05 PROCEDURE — 96372 THER/PROPH/DIAG INJ SC/IM: CPT

## 2025-06-05 PROCEDURE — 97161 PT EVAL LOW COMPLEX 20 MIN: CPT | Mod: GP

## 2025-06-05 PROCEDURE — 84540 ASSAY OF URINE/UREA-N: CPT | Performed by: INTERNAL MEDICINE

## 2025-06-05 PROCEDURE — 82043 UR ALBUMIN QUANTITATIVE: CPT | Performed by: INTERNAL MEDICINE

## 2025-06-05 PROCEDURE — 2500000001 HC RX 250 WO HCPCS SELF ADMINISTERED DRUGS (ALT 637 FOR MEDICARE OP): Performed by: NURSE PRACTITIONER

## 2025-06-05 PROCEDURE — 70544 MR ANGIOGRAPHY HEAD W/O DYE: CPT

## 2025-06-05 PROCEDURE — 97165 OT EVAL LOW COMPLEX 30 MIN: CPT | Mod: GO

## 2025-06-05 PROCEDURE — 70551 MRI BRAIN STEM W/O DYE: CPT

## 2025-06-05 PROCEDURE — 81003 URINALYSIS AUTO W/O SCOPE: CPT | Performed by: STUDENT IN AN ORGANIZED HEALTH CARE EDUCATION/TRAINING PROGRAM

## 2025-06-05 PROCEDURE — 82436 ASSAY OF URINE CHLORIDE: CPT | Performed by: INTERNAL MEDICINE

## 2025-06-05 PROCEDURE — 2500000004 HC RX 250 GENERAL PHARMACY W/ HCPCS (ALT 636 FOR OP/ED)

## 2025-06-05 PROCEDURE — 93306 TTE W/DOPPLER COMPLETE: CPT | Performed by: STUDENT IN AN ORGANIZED HEALTH CARE EDUCATION/TRAINING PROGRAM

## 2025-06-05 PROCEDURE — 84443 ASSAY THYROID STIM HORMONE: CPT

## 2025-06-05 PROCEDURE — 70544 MR ANGIOGRAPHY HEAD W/O DYE: CPT | Performed by: RADIOLOGY

## 2025-06-05 PROCEDURE — 84133 ASSAY OF URINE POTASSIUM: CPT | Performed by: INTERNAL MEDICINE

## 2025-06-05 PROCEDURE — 83935 ASSAY OF URINE OSMOLALITY: CPT | Mod: PARLAB

## 2025-06-05 PROCEDURE — 82570 ASSAY OF URINE CREATININE: CPT

## 2025-06-05 PROCEDURE — 70551 MRI BRAIN STEM W/O DYE: CPT | Performed by: RADIOLOGY

## 2025-06-05 PROCEDURE — 76770 US EXAM ABDO BACK WALL COMP: CPT

## 2025-06-05 PROCEDURE — 2500000001 HC RX 250 WO HCPCS SELF ADMINISTERED DRUGS (ALT 637 FOR MEDICARE OP)

## 2025-06-05 PROCEDURE — 82947 ASSAY GLUCOSE BLOOD QUANT: CPT

## 2025-06-05 PROCEDURE — 99223 1ST HOSP IP/OBS HIGH 75: CPT | Performed by: NURSE PRACTITIONER

## 2025-06-05 PROCEDURE — 80061 LIPID PANEL: CPT

## 2025-06-05 PROCEDURE — 83880 ASSAY OF NATRIURETIC PEPTIDE: CPT

## 2025-06-05 PROCEDURE — 76770 US EXAM ABDO BACK WALL COMP: CPT | Performed by: RADIOLOGY

## 2025-06-05 PROCEDURE — 85027 COMPLETE CBC AUTOMATED: CPT

## 2025-06-05 PROCEDURE — 96361 HYDRATE IV INFUSION ADD-ON: CPT

## 2025-06-05 RX ORDER — LISINOPRIL 20 MG/1
20 TABLET ORAL DAILY
Status: DISCONTINUED | OUTPATIENT
Start: 2025-06-05 | End: 2025-06-06 | Stop reason: HOSPADM

## 2025-06-05 RX ORDER — METFORMIN HYDROCHLORIDE 500 MG/1
500 TABLET ORAL 2 TIMES DAILY
Status: DISCONTINUED | OUTPATIENT
Start: 2025-06-05 | End: 2025-06-06 | Stop reason: HOSPADM

## 2025-06-05 RX ORDER — GLIMEPIRIDE 4 MG/1
4 TABLET ORAL
Status: DISCONTINUED | OUTPATIENT
Start: 2025-06-06 | End: 2025-06-06 | Stop reason: HOSPADM

## 2025-06-05 RX ORDER — FLUTICASONE PROPIONATE 50 MCG
2 SPRAY, SUSPENSION (ML) NASAL DAILY
Status: DISCONTINUED | OUTPATIENT
Start: 2025-06-05 | End: 2025-06-06 | Stop reason: HOSPADM

## 2025-06-05 RX ORDER — HYDRALAZINE HYDROCHLORIDE 50 MG/1
50 TABLET, FILM COATED ORAL 2 TIMES DAILY
Status: DISCONTINUED | OUTPATIENT
Start: 2025-06-05 | End: 2025-06-06

## 2025-06-05 RX ORDER — DULOXETIN HYDROCHLORIDE 30 MG/1
60 CAPSULE, DELAYED RELEASE ORAL DAILY
Status: DISCONTINUED | OUTPATIENT
Start: 2025-06-05 | End: 2025-06-06 | Stop reason: HOSPADM

## 2025-06-05 RX ORDER — DOCUSATE SODIUM 100 MG/1
100 CAPSULE, LIQUID FILLED ORAL DAILY
Status: DISCONTINUED | OUTPATIENT
Start: 2025-06-06 | End: 2025-06-06 | Stop reason: HOSPADM

## 2025-06-05 RX ORDER — ATORVASTATIN CALCIUM 80 MG/1
80 TABLET, FILM COATED ORAL NIGHTLY
Status: DISCONTINUED | OUTPATIENT
Start: 2025-06-05 | End: 2025-06-06 | Stop reason: HOSPADM

## 2025-06-05 RX ORDER — SERTRALINE HYDROCHLORIDE 100 MG/1
100 TABLET, FILM COATED ORAL DAILY
Status: DISCONTINUED | OUTPATIENT
Start: 2025-06-05 | End: 2025-06-05

## 2025-06-05 RX ORDER — FUROSEMIDE 20 MG/1
20 TABLET ORAL DAILY
Status: DISCONTINUED | OUTPATIENT
Start: 2025-06-05 | End: 2025-06-06 | Stop reason: HOSPADM

## 2025-06-05 RX ADMIN — HEPARIN SODIUM 5000 UNITS: 5000 INJECTION, SOLUTION INTRAVENOUS; SUBCUTANEOUS at 21:04

## 2025-06-05 RX ADMIN — DULOXETINE 60 MG: 30 CAPSULE, DELAYED RELEASE ORAL at 18:13

## 2025-06-05 RX ADMIN — INSULIN LISPRO 2 UNITS: 100 INJECTION, SOLUTION INTRAVENOUS; SUBCUTANEOUS at 18:12

## 2025-06-05 RX ADMIN — ASPIRIN 81 MG: 81 TABLET, COATED ORAL at 09:03

## 2025-06-05 RX ADMIN — POLYETHYLENE GLYCOL 3350 17 G: 17 POWDER, FOR SOLUTION ORAL at 09:02

## 2025-06-05 RX ADMIN — HYDRALAZINE HYDROCHLORIDE 50 MG: 50 TABLET ORAL at 21:04

## 2025-06-05 RX ADMIN — INSULIN LISPRO 2 UNITS: 100 INJECTION, SOLUTION INTRAVENOUS; SUBCUTANEOUS at 09:03

## 2025-06-05 RX ADMIN — ATORVASTATIN CALCIUM 80 MG: 80 TABLET, FILM COATED ORAL at 21:03

## 2025-06-05 RX ADMIN — CLOPIDOGREL BISULFATE 75 MG: 75 TABLET, FILM COATED ORAL at 09:02

## 2025-06-05 RX ADMIN — HEPARIN SODIUM 5000 UNITS: 5000 INJECTION, SOLUTION INTRAVENOUS; SUBCUTANEOUS at 06:16

## 2025-06-05 RX ADMIN — METFORMIN HYDROCHLORIDE 500 MG: 500 TABLET ORAL at 21:03

## 2025-06-05 RX ADMIN — LISINOPRIL 20 MG: 20 TABLET ORAL at 18:13

## 2025-06-05 RX ADMIN — INSULIN LISPRO 2 UNITS: 100 INJECTION, SOLUTION INTRAVENOUS; SUBCUTANEOUS at 12:41

## 2025-06-05 ASSESSMENT — COGNITIVE AND FUNCTIONAL STATUS - GENERAL
STANDING UP FROM CHAIR USING ARMS: A LITTLE
MOVING FROM LYING ON BACK TO SITTING ON SIDE OF FLAT BED WITH BEDRAILS: A LITTLE
DAILY ACTIVITIY SCORE: 24
MOBILITY SCORE: 18
MOVING FROM LYING ON BACK TO SITTING ON SIDE OF FLAT BED WITH BEDRAILS: A LITTLE
DAILY ACTIVITIY SCORE: 23
TURNING FROM BACK TO SIDE WHILE IN FLAT BAD: A LITTLE
MOVING TO AND FROM BED TO CHAIR: A LITTLE
MOBILITY SCORE: 23
HELP NEEDED FOR BATHING: A LITTLE
WALKING IN HOSPITAL ROOM: A LITTLE
CLIMB 3 TO 5 STEPS WITH RAILING: A LITTLE

## 2025-06-05 ASSESSMENT — PAIN - FUNCTIONAL ASSESSMENT
PAIN_FUNCTIONAL_ASSESSMENT: 0-10
PAIN_FUNCTIONAL_ASSESSMENT: 0-10

## 2025-06-05 ASSESSMENT — ACTIVITIES OF DAILY LIVING (ADL): BATHING_ASSISTANCE: STAND BY

## 2025-06-05 ASSESSMENT — PAIN SCALES - GENERAL
PAINLEVEL_OUTOF10: 0 - NO PAIN

## 2025-06-05 NOTE — PROGRESS NOTES
Tania Edmond is a 74 y.o. male on day 0 of admission presenting with LUDA (acute kidney injury).      Subjective   06/05: Patient seen and fully examined at bedside. Patient's wife is in the room. No overnight events. Significant lab findings include: hyperglycemia, hyponatremia @133, hemoglobin decreasing @12.8 from 14. Waiting on results from ultrasound. Plan is to rehydrate and correct electrolyte abnormalities. Continue medications, monitor overnight, repeat labs and A1c in the morning.       Objective     Last Recorded Vitals  /59 (BP Location: Left arm, Patient Position: Sitting)   Pulse 66   Temp 36.9 °C (98.4 °F) (Temporal)   Resp 18   Wt 86.2 kg (190 lb 0.6 oz)   SpO2 95%   Intake/Output last 3 Shifts:    Intake/Output Summary (Last 24 hours) at 6/5/2025 1426  Last data filed at 6/5/2025 1044  Gross per 24 hour   Intake 931.25 ml   Output --   Net 931.25 ml       Admission Weight  Weight: 86.2 kg (190 lb) (06/04/25 1813)    Daily Weight  06/04/25 : 86.2 kg (190 lb 0.6 oz)    Image Results  Transthoracic Echo Complete     [facility], [address] [place] [address]            Tel [phone] and Fax [phone]    TRANSTHORACIC ECHOCARDIOGRAM REPORT       Patient Name:       TANIA EDMOND         Gi Physician:    [id number] Chas Schmidt MD  Study Date:         6/5/2025            Ordering Provider:    [id number] GAIL IVORY  MRN/PID:            [id number]            Fellow:  Accession#:         [id number]        Nurse:                Felecia Colón RN  Date of Birth/Age:  1950 / 74      Sonographer:          Sd covarrubias RDCS  Gender assigned at                     Additional Staff:  Birth:  Height:             177.00  andreas           Admit Date:           6/4/2025  Weight:             86.00 kg            Admission Status:     Inpatient -                                                                Routine  BSA / BMI:          2.03 m2 / 27.45     Encounter#:           [id number]                      kg/m2  Blood Pressure:     101/55 mmHg         Department Location:  [address]                                                                [facility]    Study Type:    TRANSTHORACIC ECHO (TTE) COMPLETE  Diagnosis/ICD: Aphasia-R47.01  Indication:    Aphasia;  CPT Code:      Echo Complete w Full Doppler-[id number]    Patient History:  Pertinent History: PMH DMII, stroke, HLD, b/l carotid artery stenosis s/p                     carotid shunt, Aphasia.    Study Detail: The following Echo studies were performed: 2D, M-Mode, Doppler,                color flow and 3D. Technically challenging study due to body                habitus. Agitated saline used as a contrast agent for intraseptal                flow evaluation.       PHYSICIAN INTERPRETATION:  Left Ventricle: The left ventricular systolic function is normal with a visually estimated ejection fraction of 55-60%. There are no regional left ventricular wall motion abnormalities. The left ventricular cavity size is normal. There is mildly increased septal and mildly increased posterior left ventricular wall thickness. There is left ventricular concentric remodeling. Spectral Doppler shows a Grade I (impaired relaxation pattern) of left ventricular diastolic filling with normal left atrial filling pressure.  Left Atrium: The left atrial size is normal.  Right Ventricle: The right ventricle is normal in size. There is normal right ventricular global systolic function.  Right Atrium: The right atrium is normal in size.  Aortic Valve: The aortic valve is trileaflet. The aortic valve area by VTI is 2.84 cmï¿½ with a peak velocity of 0.92 m/s. The peak and mean gradients are 3 mmHg and 2  mmHg, respectively, with a dimensionless index of 0.75. There is no evidence of aortic valve regurgitation.  Mitral Valve: The mitral valve is normal in structure. There is trace mitral valve regurgitation. The E Vmax is 0.68 m/s.  Tricuspid Valve: The tricuspid valve is structurally normal. There is trace tricuspid regurgitation. The right ventricular systolic pressure could not be estimated.  Pulmonic Valve: The pulmonic valve is structurally normal. There is no indication of pulmonic valve regurgitation.  Pericardium: Trivial pericardial effusion.  Aorta: The aortic root is normal.  Systemic Veins: The inferior vena cava appears normal in size, with IVC inspiratory collapse greater than 50%.  In comparison to the previous echocardiogram(s): Compared with study dated 12/13/2021,.       CONCLUSIONS:   1. The left ventricular systolic function is normal with a visually estimated ejection fraction of 55-60%.   2. Spectral Doppler shows a Grade I (impaired relaxation pattern) of left ventricular diastolic filling with normal left atrial filling pressure.   3. There is normal right ventricular global systolic function.    QUANTITATIVE DATA SUMMARY:     2D MEASUREMENTS:          Normal Ranges:  LAs:             2.80 cm  (2.7-4.0cm)  IVSd:            1.20 cm  (0.6-1.1cm)  LVPWd:           1.20 cm  (0.6-1.1cm)  LVIDd:           3.10 cm  (3.9-5.9cm)  LVIDs:           2.60 cm  LV Mass Index:   56 g/m2  LVEDV Index:     40 ml/m2  LV % FS          16.1 %       LEFT ATRIUM:                  Normal Ranges:  LA Vol A4C:        31.2 ml    (22+/-6mL/m2)  LA Vol A2C:        27.1 ml  LA Vol BP:         30.2 ml  LA Vol Index A4C:  15.4ml/m2  LA Vol Index A2C:  13.3 ml/m2  LA Vol Index BP:   14.9 ml/m2  LA Area A4C:       12.5 cm2  LA Area A2C:       12.1 cm2  LA Major Axis A4C: 4.2 cm  LA Major Axis A2C: 4.6 cm  LA Volume Index:   14.9 ml/m2       RIGHT ATRIUM:                 Normal Ranges:  RA Vol A4C:        34.3 ml     (8.3-19.5ml)  RA Vol Index A4C:  16.8 ml/m2  RA Area A4C:       14.6 cm2  RA Major Axis A4C: 5.3 cm       M-MODE MEASUREMENTS:         Normal Ranges:  Ao Root:             3.50 cm (2.0-3.7cm)  LAs:                 3.70 cm (2.7-4.0cm)       AORTA MEASUREMENTS:         Normal Ranges:  Ao Sinus, d:        3.70 cm (2.1-3.5cm)  Ao STJ, d:          3.10 cm (1.7-3.4cm)  Asc Ao, d:          3.60 cm (2.1-3.4cm)       LV SYSTOLIC FUNCTION:                       Normal Ranges:  EF-A4C View:    53 % (>=55%)  EF-A2C View:    62 %  EF-Biplane:     57 %  EF-Visual:      58 %  LV EF Reported: 58 %       LV DIASTOLIC FUNCTION:             Normal Ranges:  MV Peak E:             0.68 m/s    (0.7-1.2 m/s)  MV Peak A:             1.01 m/s    (0.42-0.7 m/s)  E/A Ratio:             0.67        (1.0-2.2)  MV e'                  0.084 m/s   (>8.0)  MV lateral e'          0.09 m/s  MV medial e'           0.08 m/s  MV A Dur:              206.00 msec  E/e' Ratio:            8.03        (<8.0)  PulmV Sys Jhony:         30.20 cm/s  PulmV Schmidt Jhony:        23.20 cm/s  PulmV S/D Jhony:         1.30  PulmV A Revs Jhony:      17.60 cm/s  PulmV A Revs Dur:      151.00 msec       MITRAL VALVE:          Normal Ranges:  MV DT:        200 msec (150-240msec)       AORTIC VALVE:                     Normal Ranges:  AoV Vmax:                0.92 m/s (<=1.7m/s)  AoV Peak PG:             3.4 mmHg (<20mmHg)  AoV Mean P.0 mmHg (1.7-11.5mmHg)  LVOT Max Jhony:            0.84 m/s (<=1.1m/s)  AoV VTI:                 18.20 cm (18-25cm)  LVOT VTI:                13.60 cm  LVOT Diameter:           2.20 cm  (1.8-2.4cm)  AoV Area, VTI:           2.84 cm2 (2.5-5.5cm2)  AoV Area,Vmax:           3.45 cm2 (2.5-4.5cm2)  AoV Dimensionless Index: 0.75       RIGHT VENTRICLE:  RV Basal 3.40 cm  RV Mid   2.80 cm  RV Major 7.8 cm  TAPSE:   18.3 mm  RV s'    0.13 m/s       TRICUSPID VALVE/RVSP:         Normal Ranges:  Est. RA Pressure:     3  IVC Diam:             1.85  cm       PULMONIC VALVE:          Normal Ranges:  PV Max Jhony:     1.0 m/s  (0.6-0.9m/s)  PV Max PG:      3.8 mmHg       PULMONARY VEINS:  PulmV A Revs Dur: 151.00 msec  PulmV A Revs Jhony: 17.60 cm/s  PulmV Schmidt Jhony:   23.20 cm/s  PulmV S/D Jhony:    1.30  PulmV Sys Jhony:    30.20 cm/s       [id number] Chas Schmidt MD  Electronically signed on 6/5/2025 at 1:08:27 PM       ** Final **  MR angio head wo IV contrast, MR angio neck wo IV contrast  Narrative: Interpreted By:  Reggie Nuñez,   STUDY:  MR ANGIO NECK WO IV CONTRAST; MR ANGIO HEAD WO IV CONTRAST;  6/5/2025  8:55 am      INDICATION:  Signs/Symptoms:Aphasia/AMS.          COMPARISON:  MRA dated 12/13/2021      ACCESSION NUMBER(S):  [id number]; [id number]      ORDERING CLINICIAN:  GAIL IVORY      TECHNIQUE:  Time-of-flight MRA images of the neck and intracranial vessels were  obtained.      FINDINGS:  The MRA of the neck demonstrates no significant stenosis along the  bilateral carotid bifurcations or visualized cervical segment of the  right dominant vertebral artery. There is again evidence of a small  caliber left vertebral artery with segmental areas of diminished MRA  signal/MRA signal dropout along expected more distal course of the  cervical left vertebral artery suspicious for segmental occlusion,  marked narrowing, and/or markedly diminished flow.      The intracranial MRA demonstrates an asymmetrically small caliber M1  segment of the left middle cerebral artery with asymmetric diminished  visualization of M2 and more distal branch vessels of the left middle  cerebral artery when compared with the right more pronounced when  compared with the prior intracranial MRA dated 12/13/2021. There is  again evidence of a segmental area of diminished MRA signal/MRA  signal dropout along the proximal A1 segment of the left anterior  cerebral artery suspicious for segmental narrowing and/or occlusion.  There are additional segmental areas of diminished MRA  signal noted  along the proximal A1 segment of the right anterior cerebral artery  as well as A1 and A2 segments of the right posterior cerebral artery  suspicious for segmental areas of narrowing in these regions as well.  There is a segmental area of diminished MRA signal raising the  possibility of segmental narrowing and/or artifact along a more  distal branch vessel of the left posterior cerebral artery. There is  lack of MRA signal expected location of the distal left vertebral  artery caudal to the origin of the left posterior inferior cerebellar  artery.      Impression: The MRA of the neck demonstrates no significant stenosis along the  bilateral carotid bifurcations or visualized cervical segment of the  right dominant vertebral artery. There is again evidence of a small  caliber left vertebral artery with segmental areas of diminished MRA  signal/MRA signal dropout along expected more distal course of the  cervical left vertebral artery suspicious for segmental occlusion,  marked narrowing, and/or markedly diminished flow.      The intracranial MRA demonstrates an asymmetrically small caliber M1  segment of the left middle cerebral artery with asymmetric diminished  visualization of M2 and more distal branch vessels of the left middle  cerebral artery when compared with the right more pronounced when  compared with the prior intracranial MRA dated 12/13/2021. There is  again evidence of a segmental area of diminished MRA signal/MRA  signal dropout along the proximal A1 segment of the left anterior  cerebral artery suspicious for segmental narrowing and/or occlusion.  There are additional segmental areas of diminished MRA signal noted  along the proximal A1 segment of the right anterior cerebral artery  as well as A1 and A2 segments of the right posterior cerebral artery  suspicious for segmental areas of narrowing in these regions as well.  There is a segmental area of diminished MRA signal raising  the  possibility of segmental narrowing and/or artifact along a more  distal branch vessel of the left posterior cerebral artery. There is  lack of MRA signal expected location of the distal left vertebral  artery caudal to the origin of the left posterior inferior cerebellar  artery.      MACRO:  Critical Finding:  See findings. Notification was initiated on  6/5/2025 at 9:50 am by  Reggie Nuñez.  (**-OCF-**)      Signed by: Reggie Nuñez 6/5/2025 9:50 AM  Dictation workstation:   [id number]  MR brain wo IV contrast  Narrative: Interpreted By:  Liam Galvez,  Myriam Gold   STUDY:  MR BRAIN WO IV CONTRAST;  6/5/2025 8:55 am      INDICATION:  Signs/Symptoms:aphasia/AMS.          COMPARISON:  CT head 06/04/2025, brain MR 12/15/2021      ACCESSION NUMBER(S):  [id number]      ORDERING CLINICIAN:  GAIL IVORY      TECHNIQUE:  Axial T2, FLAIR, DWI, gradient echo T2 and sagittal and coronal T1  weighted images of brain were acquired.      FINDINGS:  CSF Spaces: The ventricles, sulci and basal cisterns are within  normal limits.      Parenchyma: There is no diffusion restriction abnormality to suggest  acute infarct.  There is no mass effect or midline shift. Gradient  echo T2 weighted images fail to reveal the presence of hemorrhage or  hemosiderin deposition. There is mild-to-moderate diffuse parenchymal  volume loss with associated widening of the sulci. There are  scattered foci of increased signal in the subcortical and  periventricular white matter best appreciated on FLAIR images,  minimally increased as compared to prior brain MR; although  nonspecific, the FLAIR hyperintensities are favored to represent  sequela of chronic microvascular ischemic changes.      Paranasal Sinuses and Mastoids: Minimal mucosal thickening of the  right maxillary sinus. Otherwise, the visualized paranasal sinuses  and mastoid air cells are unremarkable.      Impression: 1.  No evidence of acute infarct, intracranial  mass effect or midline  shift.  2. Minimally increased FLAIR hyperintensities which although  nonspecific is favored to represent chronic microvascular ischemic  changes.  3. Mild-to-moderate diffuse parenchymal volume loss.      I personally reviewed the images/study and I agree with the findings  as stated by Asif Allred MD (PGY-2). This study was interpreted at  [facility], [place], [place].      MACRO:  None      Signed by: Liam Galvez 6/5/2025 9:34 AM  Dictation workstation:   [id number]  ECG 12 lead  Sinus rhythm with sinus arrhythmia with 1st degree AV block  Left axis deviation  Right bundle branch block  Abnormal ECG  When compared with ECG of 12-DEC-2021 21:57,  PREVIOUS ECG IS PRESENT      Physical Exam    General: in no acute distress  Eyes: PERRLA   HENT: Normo-cephalic, atraumatic.   CV: Regular rate, regular rhythm.   Resp: Breathing non-labored,  diminished to auscultation bilaterally  GI: BS x4   : monitor intake and output  MSK/Extremities: No gross bony deformities. PT/OT on the case  Skin: Warm. Appropriate color, continue offloading  Neuro:  Face symmetric.   Psych: returning to baseline, improved     10 point ROS negative unless otherwise noted in HPI    Patient fully evaluated 06/05  for    Problem List Items Addressed This Visit          Endocrine/Metabolic    Hyponatremia - Primary       Genitourinary and Reproductive    * (Principal) LUDA (acute kidney injury)    Hyperkalemia       Neuro    Aphasia    Relevant Orders    Transthoracic Echo Complete (Completed)     Brought to hospital - had concerns including Hyperglycemia.  Patient seen resting in bed with head of bed elevated, no s/s or c/o acute difficulties at this time.  Vital signs for last 24 hours Temp:  [36 °C (96.8 °F)-36.9 °C (98.4 °F)] 36.9 °C (98.4 °F)  Heart Rate:  [66-95] 66  Resp:  [17-18] 18  BP: (101-141)/(55-85) 127/59    I/O this shift:  In: 931.3 [I.V.:931.3]  Out: -   Patient  still requiring frequent cardiac and SPO2 monitoring. Continue aggressive pulmonary hygiene and oral hygiene. Off loading as tolerated for skin integrity. Medications and labs reviewed-   Results for orders placed or performed during the hospital encounter of 06/04/25 (from the past 24 hours)   CBC and Auto Differential   Result Value Ref Range    WBC 9.1 4.4 - 11.3 x10*3/uL    nRBC 0.0 0.0 - 0.0 /100 WBCs    RBC 4.72 4.50 - 5.90 x10*6/uL    Hemoglobin 14.5 13.5 - 17.5 g/dL    Hematocrit 44.0 41.0 - 52.0 %    MCV 93 80 - 100 fL    MCH 30.7 26.0 - 34.0 pg    MCHC 33.0 32.0 - 36.0 g/dL    RDW 12.5 11.5 - 14.5 %    Platelets 323 150 - 450 x10*3/uL    Neutrophils % 72.6 40.0 - 80.0 %    Immature Granulocytes %, Automated 0.3 0.0 - 0.9 %    Lymphocytes % 15.1 13.0 - 44.0 %    Monocytes % 9.1 2.0 - 10.0 %    Eosinophils % 1.9 0.0 - 6.0 %    Basophils % 1.0 0.0 - 2.0 %    Neutrophils Absolute 6.57 (H) 1.60 - 5.50 x10*3/uL    Immature Granulocytes Absolute, Automated 0.03 0.00 - 0.50 x10*3/uL    Lymphocytes Absolute 1.37 0.80 - 3.00 x10*3/uL    Monocytes Absolute 0.82 (H) 0.05 - 0.80 x10*3/uL    Eosinophils Absolute 0.17 0.00 - 0.40 x10*3/uL    Basophils Absolute 0.09 0.00 - 0.10 x10*3/uL   Magnesium   Result Value Ref Range    Magnesium 1.74 1.60 - 2.40 mg/dL   Comprehensive metabolic panel   Result Value Ref Range    Glucose 290 (H) 74 - 99 mg/dL    Sodium 128 (L) 136 - 145 mmol/L    Potassium 5.4 (H) 3.5 - 5.3 mmol/L    Chloride 92 (L) 98 - 107 mmol/L    Bicarbonate 22 21 - 32 mmol/L    Anion Gap 19 10 - 20 mmol/L    Urea Nitrogen 29 (H) 6 - 23 mg/dL    Creatinine 1.72 (H) 0.50 - 1.30 mg/dL    eGFR 41 (L) >60 mL/min/1.73m*2    Calcium 10.1 8.6 - 10.3 mg/dL    Albumin 4.4 3.4 - 5.0 g/dL    Alkaline Phosphatase 92 33 - 136 U/L    Total Protein 7.4 6.4 - 8.2 g/dL    AST 15 9 - 39 U/L    Bilirubin, Total 0.6 0.0 - 1.2 mg/dL    ALT 16 10 - 52 U/L   BLOOD GAS VENOUS FULL PANEL   Result Value Ref Range    POCT pH, Venous 7.46  (H) 7.33 - 7.43 pH    POCT pCO2, Venous 32 (L) 41 - 51 mm Hg    POCT pO2, Venous 50 (H) 35 - 45 mm Hg    POCT SO2, Venous 85 (H) 45 - 75 %    POCT Oxy Hemoglobin, Venous 82.7 (H) 45.0 - 75.0 %    POCT Hematocrit Calculated, Venous 45.0 41.0 - 52.0 %    POCT Sodium, Venous 125 (L) 136 - 145 mmol/L    POCT Potassium, Venous 5.4 (H) 3.5 - 5.3 mmol/L    POCT Chloride, Venous 92 (L) 98 - 107 mmol/L    POCT Ionized Calicum, Venous 1.14 1.10 - 1.33 mmol/L    POCT Glucose, Venous 319 (H) 74 - 99 mg/dL    POCT Lactate, Venous 4.6 (HH) 0.4 - 2.0 mmol/L    POCT Base Excess, Venous -0.2 -2.0 - 3.0 mmol/L    POCT HCO3 Calculated, Venous 22.8 22.0 - 26.0 mmol/L    POCT Hemoglobin, Venous 15.1 13.5 - 17.5 g/dL    POCT Anion Gap, Venous 16.0 10.0 - 25.0 mmol/L    Patient Temperature 37.0 degrees Celsius    FiO2 21 %    Critical Called By ELIGIO HDZ, RRT     Critical Called To Dr. GOYAL     Critical Call Time 1837     Critical Read Back Y    Beta Hydroxybutyrate   Result Value Ref Range    Beta-Hydroxybutyrate 0.23 0.02 - 0.27 mmol/L   Troponin I, High Sensitivity   Result Value Ref Range    Troponin I, High Sensitivity 16 0 - 20 ng/L   ECG 12 lead   Result Value Ref Range    Ventricular Rate 87 BPM    Atrial Rate 87 BPM    ND Interval 314 ms    QRS Duration 132 ms    QT Interval 356 ms    QTC Calculation(Bazett) 428 ms    P Axis -6 degrees    R Axis -72 degrees    T Axis -6 degrees    QRS Count 15 beats    Q Onset 211 ms    P Onset 54 ms    P Offset 127 ms    T Offset 389 ms    QTC Fredericia 402 ms   POCT GLUCOSE   Result Value Ref Range    POCT Glucose 183 (H) 74 - 99 mg/dL   Urinalysis with Reflex Culture and Microscopic   Result Value Ref Range    Color, Urine Light-Yellow Light-Yellow, Yellow, Dark-Yellow    Appearance, Urine Clear Clear    Specific Gravity, Urine 1.007 1.005 - 1.035    pH, Urine 6.5 5.0, 5.5, 6.0, 6.5, 7.0, 7.5, 8.0    Protein, Urine NEGATIVE NEGATIVE, 10 (TRACE), 20 (TRACE) mg/dL    Glucose, Urine  300 (3+) (A) Normal mg/dL    Blood, Urine NEGATIVE NEGATIVE mg/dL    Ketones, Urine NEGATIVE NEGATIVE mg/dL    Bilirubin, Urine NEGATIVE NEGATIVE mg/dL    Urobilinogen, Urine Normal Normal mg/dL    Nitrite, Urine NEGATIVE NEGATIVE    Leukocyte Esterase, Urine NEGATIVE NEGATIVE   Extra Urine Gray Tube   Result Value Ref Range    Extra Tube 293    Osmolality, urine   Result Value Ref Range    Osmolality, Urine Random 284 200 - 1,200 mOsm/kg   Sodium, Urine Random   Result Value Ref Range    Sodium, Urine Random 44 mmol/L    Creatinine, Urine Random 46.7 20.0 - 370.0 mg/dL    Sodium/Creatinine Ratio 94 Not established. mmol/g Creat   POCT GLUCOSE   Result Value Ref Range    POCT Glucose 197 (H) 74 - 99 mg/dL   Hemoglobin A1C   Result Value Ref Range    Hemoglobin A1C 11.8 (H) See comment %    Estimated Average Glucose 292 Not Established mg/dL   B-Type Natriuretic Peptide   Result Value Ref Range    BNP 45 0 - 99 pg/mL   TSH with reflex to Free T4 if abnormal   Result Value Ref Range    Thyroid Stimulating Hormone 1.73 0.44 - 3.98 mIU/L   Osmolality   Result Value Ref Range    Osmolality, Serum 292 280 - 300 mOsm/kg   Lipid Panel   Result Value Ref Range    Cholesterol 193 0 - 199 mg/dL    HDL-Cholesterol 31.6 mg/dL    Cholesterol/HDL Ratio 6.1     LDL Calculated 121 (H) <=99 mg/dL    VLDL 40 0 - 40 mg/dL    Triglycerides 202 (H) 0 - 149 mg/dL    Non HDL Cholesterol 161 (H) 0 - 149 mg/dL   CBC   Result Value Ref Range    WBC 5.1 4.4 - 11.3 x10*3/uL    nRBC 0.0 0.0 - 0.0 /100 WBCs    RBC 4.22 (L) 4.50 - 5.90 x10*6/uL    Hemoglobin 12.8 (L) 13.5 - 17.5 g/dL    Hematocrit 39.9 (L) 41.0 - 52.0 %    MCV 95 80 - 100 fL    MCH 30.3 26.0 - 34.0 pg    MCHC 32.1 32.0 - 36.0 g/dL    RDW 12.6 11.5 - 14.5 %    Platelets 254 150 - 450 x10*3/uL   Basic metabolic panel   Result Value Ref Range    Glucose 200 (H) 74 - 99 mg/dL    Sodium 133 (L) 136 - 145 mmol/L    Potassium 4.5 3.5 - 5.3 mmol/L    Chloride 97 (L) 98 - 107 mmol/L     Bicarbonate 29 21 - 32 mmol/L    Anion Gap 12 10 - 20 mmol/L    Urea Nitrogen 26 (H) 6 - 23 mg/dL    Creatinine 1.21 0.50 - 1.30 mg/dL    eGFR 63 >60 mL/min/1.73m*2    Calcium 9.5 8.6 - 10.3 mg/dL   Coagulation Screen   Result Value Ref Range    Protime 11.1 9.8 - 12.4 seconds    INR 1.0 0.9 - 1.1    aPTT 30 26 - 36 seconds   Transthoracic Echo Complete   Result Value Ref Range    AV pk joanna 0.92 m/s    AV mn grad 2 mmHg    LVOT diam 2.20 cm    MV E/A ratio 0.67     LA vol index A/L 14.9 ml/m2    Tricuspid annular plane systolic excursion 1.8 cm    LV EF 58 %    RV free wall pk S' 12.90 cm/s    LVIDd 3.10 cm    Aortic Valve Area by Continuity of VTI 2.84 cm2    Aortic Valve Area by Continuity of Peak Velocity 3.45 cm2    AV pk grad 3 mmHg    LV A4C EF 53.0    POCT GLUCOSE   Result Value Ref Range    POCT Glucose 184 (H) 74 - 99 mg/dL      Patient recently received an antibiotic (last 12 hours)       None           Plan discussed with interdisciplinary team, continue current and repeat labs in the AM.       Discharge planning discussed with patient and care team. Therapy evaluations ordered.   [facility]-   18    Patient aware and agreeable to current plan, continue plan as above.     I spent a total of 60 minutes on the date of the service which included preparing to see the patient, face-to-face patient care, completing clinical documentation, obtaining and/or reviewing separately obtained history, performing a medically appropriate examination, counseling and educating the patient/family/caregiver, ordering medications, tests, or procedures, communicating with other HCPs (not separately reported), independently interpreting results (not separately reported), communicating results to the patient/family/caregiver, and care coordination (not separately reported).     Relevant Results                              Assessment & Plan  LUDA (acute kidney injury)    Aphasia    Altered mental  status    Hyponatremia    Hyperkalemia    Dehydration    Hyperglycemia        JOSUÉ QUEENIE GARSIA

## 2025-06-05 NOTE — PROGRESS NOTES
Physical Therapy    Physical Therapy Evaluation    Patient Name: Modesto Lorenz  MRN: [id number]  Today's Date: 6/5/2025   Time Calculation  Start Time: 1030  Stop Time: 1045  Time Calculation (min): 15 min  328/328-A    Assessment/Plan   PT Assessment  Rehab Prognosis: Good  Barriers to Discharge Home: No anticipated barriers  Evaluation/Treatment Tolerance: Patient tolerated treatment well  Strengths: Ability to acquire knowledge, Capable of completing ADLs semi/independent, Support of Caregivers  End of Session Communication: Bedside nurse  Assessment Comment: Pt admitted with AMS at home and found to have LUDA, HTN and hyperglycemia. Pt demos decreased safety awareness and is at baseline LOF. Pt educated on safety of use of device at home and avoiding stairs while trying to carry objects in hands. Recommend return to home with prn assist from wife.  End of Session Patient Position: Up in chair, Alarm on  IP OR SWING BED PT PLAN  Inpatient or Swing Bed: Inpatient  PT Plan  PT Plan: PT Eval only  PT Eval Only Reason: No acute PT needs identified  PT Frequency: PT eval only  PT Discharge Recommendations: No further acute PT  PT - OK to Discharge: Yes    Subjective     Current Problem:  1. Hyponatremia        2. Hyperkalemia        3. LUDA (acute kidney injury)        4. Aphasia  Transthoracic Echo Complete    Transthoracic Echo Complete        Problem List[1]    General Visit Information:  General  Reason for Referral: PT eval and treat; impaired mobility  Referred By: JUAN Escobar  Past Medical History Relevant to Rehab: Pt admitted 6/4 with AMS while working in yard. Found to have LUDA, hyperglycemia and hypertension. (PMH: DM, CVA, HLD, carotid artery stenosis s/p shunt.)  Co-Treatment: OT  Co-Treatment Reason: to maximize patient safety and participation  Prior to Session Communication: Bedside nurse  Patient Position Received: Bed, 2 rail up, Alarm off, not on at start of session  General Comment: Pt  pleasant and agreeable to therapy.    Home Living:  Home Living  Home Living Comments: Pt lives with his wife in a multi level home with 1 [address]TE. Pt has 12 steps to bedroom and mainly sleeps on first floor in chair. Pt has workshop in basement with 12 steps and HR. Pt has WIS with HHS and grabbar. Has elevated toilet with grabbar. Helps care for his son who is quadriplegic.    Prior Level of Function:  Prior Function Per Pt/Caregiver Report  Level of [place]: Independent with ADLs and functional transfers, Independent with homemaking with ambulation  Prior Function Comments: Wife drives and completes IADLs. recent use of cane at home due to decreased balance    Precautions:  Precautions  Medical Precautions: Fall precautions     Objective     Pain:  Pain Assessment  Pain Assessment: 0-10  0-10 (Numeric) Pain Score: 0 - No pain    Cognition:  Cognition  Overall Cognitive Status: Within Functional Limits (verbose and needs cues to redirect)  Orientation Level: Oriented X4    General Assessments:      Activity Tolerance  Endurance: Endurance does not limit participation in activity  Sensation  Light Touch: No apparent deficits  Strength  Strength Comments: demos WFL BLE     Functional Assessments:     Bed Mobility  Bed Mobility:  (completed supine to sit independently. Denies dizziness)  Transfers  Transfer:  (from EOB to no device with SBA. Denies dizziness.)  Ambulation/Gait Training  Ambulation/Gait Training Performed:  (Completed ~75'x1 with use of no device and SBA for safety. Denies dizziness.)     Extremity/Trunk Assessments:  RLE   RLE : Within Functional Limits  LLE   LLE : Within Functional Limits    Outcome Measures:     [facility] Basic Mobility  Turning from your back to your side while in a flat bed without using bedrails: A little  Moving from lying on your back to sitting on the side of a flat bed without using bedrails: None  Moving to and from bed to chair (including a wheelchair): None  Standing up  from a chair using your arms (e.g. wheelchair or bedside chair): None  To walk in hospital room: None  Climbing 3-5 steps with railing: None  Basic Mobility - Total Score: 23     Goals:  Eval only    Education Documentation  Precautions, taught by Leida Espinosa PT at 6/5/2025 11:48 AM.  Learner: Patient  Readiness: Acceptance  Method: Explanation  Response: Verbalizes Understanding    Mobility Training, taught by Leida Espinosa PT at 6/5/2025 11:48 AM.  Learner: Patient  Readiness: Acceptance  Method: Explanation  Response: Verbalizes Understanding    Education Comments  No comments found.              [1]   Patient Active Problem List  Diagnosis    Bilateral carotid artery stenosis    LUDA (acute kidney injury)    Aphasia    Altered mental status    Hyponatremia    Hyperkalemia    Dehydration    Hyperglycemia

## 2025-06-05 NOTE — PROGRESS NOTES
Occupational Therapy    Evaluation    Patient Name: Modesto Lorenz  MRN: [id number]  Today's Date: 6/5/2025  Time Calculation  Start Time: 1029  Stop Time: 1045  Time Calculation (min): 16 min  328/328-A    Assessment  IP OT Assessment  OT Assessment: Patient presents with do significant change in fuction.  pt able to ambulate without device and transfer independently.  no concerns about home going.  recommend diabetic education for future compliance as pt was anson bennett of gatorade for hydration.  Prognosis: Good  End of Session Communication: Bedside nurse    Plan:  OT Frequency: OT eval only  OT Discharge Recommendations: No OT needed after discharge, No further acute OT (home with family,  address additional support for caregiver status of son who is quadraiplegic.)  OT - OK to Discharge: Yes    Subjective     Current Problem:  1. Hyponatremia        2. Hyperkalemia        3. LUDA (acute kidney injury)        4. Aphasia  Transthoracic Echo Complete    Transthoracic Echo Complete          General:  General  Reason for Referral: OT eval & treat for adls and safety assesment s/p fall due to blood sugar and htn issues.  Referred By: dr garcia  Family/Caregiver Present: No  Co-Treatment: PT  Co-Treatment Reason: To maximize patient safety & mobility  Prior to Session Communication: Bedside nurse  Patient Position Received: Bed, 2 rail up, Alarm on  General Comment: Patient cleared for therapy by nursing.    Precautions:  Hearing/Visual Limitations: [place]  Medical Precautions: Fall precautions    Vital Signs:  Vital Signs Comment: VSS    Pain:  Pain Assessment  Pain Assessment: 0-10  0-10 (Numeric) Pain Score: 0 - No pain    Objective     Cognition:  Overall Cognitive Status: Within Functional Limits  Orientation Level: Oriented X4     Home Living:  Type of Home: House  Lives With: Spouse  Home Adaptive Equipment: Cane  Home Layout: Laundry in basement, Full bath main level (workroom is in basement with no handrail.  1 step  into the house. pt sleeps on 1st floor in chair.)     Prior Function:  Level of [place]: Independent with ADLs and functional transfers, Independent with homemaking with ambulation  Ambulatory Assistance:  (occassionally uses a str cane.)  Prior Function Comments: does gardening in the yard    ADL:  Eating Assistance: Independent  Grooming Assistance: Independent  Bathing Assistance: Stand by  UE Dressing Assistance: Independent  LE Dressing Assistance: Stand by  Toileting Assistance with Device: Stand by  Functional Assistance: Stand by    Activity Tolerance:  Endurance: Endurance does not limit participation in activity    Bed Mobility/Transfers:   Bed Mobility  Bed Mobility: Yes  Bed Mobility 1  Bed Mobility Comments 1: supine <-> sit independent  Transfers  Transfer: Yes  Transfer 1  Trials/Comments 1: sit to stand    Ambulation/Gait Training:  Functional Mobility  Functional Mobility Performed: Yes (pt able to ambulate in room and in hallway with 75 feet without device no device sba.)    Sitting Balance:  Static Sitting Balance  Static Sitting-Level of Assistance: Independent    Vision: Vision - Basic Assessment  Current Vision: No visual deficits        Sensation:  Light Touch: No apparent deficits    Strength:  Strength Comments: bue wfl.    Perception:  Inattention/Neglect: Appears intact    Coordination:  Movements are Fluid and Coordinated: Yes  Finger to Nose: Intact     Hand Function:  Hand Function  Gross Grasp: Functional    Extremities: RUE   RUE : Within Functional Limits and LUE   LUE: Within Functional Limits    Outcome Measures: [facility] Daily Activity  Putting on and taking off regular lower body clothing: None  Bathing (including washing, rinsing, drying): A little  Putting on and taking off regular upper body clothing: None  Toileting, which includes using toilet, bedpan or urinal: None  Taking care of personal grooming such as brushing teeth: None  Eating Meals: None  Daily Activity - Total  Score: 23     EDUCATION:     Education Documentation  Precautions, taught by Jo Mortensen OT at 6/5/2025 11:39 AM.  Learner: Patient  Readiness: Acceptance  Method: Explanation  Response: Verbalizes Understanding    Education Comments  No comments found.

## 2025-06-05 NOTE — H&P
History Of Present Illness  Modesto Lorenz is a 74 y.o. male with a past medical history of DMII, stroke, HLD, b/l carotid artery stenosis s/p carotid shunt, who presented to [facility] ED today via EMS from home with AMS. Wife states that the patient was doing yard work outside and seem to be confused. States he was trying to stay hydrated while planting flowers outside and was taking breaks to drink fluids. His wife called and he was having difficulty getting words out, which he states was similar to when he had a stroke 3 years ago. His wife was concerned about his talking and confusion so she called EMS. He was leaning against the house on EMS arrival. His blood sugar for EMS was 375 and he was hypertensive. He apparently had dried blood under his chin and did not know where that came from. He has no memory of working outside and EMS arrival. He was back to baseline on arrival to ED. States he did have a right frontal headache while in the ED that is resolved now. States he had a fall down the steps about 3 weeks ago, but no major injury except his right side hurt afterwards. Denies hitting head. Denies any other recent falls, traumas, or injuries. Denies fever, chills, chest pain, shortness of breath, cough, abdominal pain, nausea, vomiting, urinary symptoms, diarrhea, or constipation. Previous medical records reviewed.     ER course: VS on ED arrival: 36.8C, /60, HR 95, RR 18, 97% RA. EKG unavailable for my review. Labs: glucose 290. Sodium 128, chloride 92. Potassium 5.4. BUN 29, creatinine 1.72, GFR 41. Troponin 16. Neutropphils 6.57. UA ordered. See imaging results below. 2L LR given in ED.   Past Medical History  As above    Surgical History  Surgical History[1]     Social History  He reports that he quit smoking about 4 years ago. His smoking use included cigarettes. He has quit using smokeless tobacco. No history on file for alcohol use and drug use. Lives with wife. Ambulates independently.    Family  History  Family History[2]     Allergies  Patient has no known allergies.    Review of Systems  10 Point review of systems was performed and is negative except for what is stated in the HPI above.    Physical Exam  Constitutional:       Appearance: Normal appearance.      Comments: Pleasant elderly male resting comfortably in bed. No distress noted. Hard of hearing.   HENT:      Head: Normocephalic and atraumatic.      Nose: Nose normal.      Mouth/Throat:      Mouth: Mucous membranes are moist.      Pharynx: Oropharynx is clear.   Eyes:      Extraocular Movements: Extraocular movements intact.      Conjunctiva/sclera: Conjunctivae normal.      Pupils: Pupils are equal, round, and reactive to light.   Cardiovascular:      Rate and Rhythm: Normal rate and regular rhythm.      Pulses: Normal pulses.      Heart sounds: Normal heart sounds.   Pulmonary:      Effort: Pulmonary effort is normal.      Breath sounds: Normal breath sounds.   Abdominal:      General: Abdomen is flat. Bowel sounds are normal.      Palpations: Abdomen is soft.   Musculoskeletal:         General: Normal range of motion.      Cervical back: Normal range of motion and neck supple.      Right lower leg: Edema present.      Left lower leg: Edema present.   Skin:     General: Skin is warm and dry.      Capillary Refill: Capillary refill takes less than 2 seconds.      Comments: Chronic venous stasis to BLE with edema. Scattered ecchymosis to b/l upper extremities.  Dried blood noted to chin without notable source of where it came from.   Neurological:      General: No focal deficit present.      Mental Status: He is alert and oriented to person, place, and time.      Comments: NIH-0   Psychiatric:         Mood and Affect: Mood normal.         Behavior: Behavior normal.         Thought Content: Thought content normal.         Judgment: Judgment normal.          Last Recorded Vitals  Blood pressure 116/59, pulse 67, temperature 36.3 °C (97.3 °F),  "temperature source Temporal, resp. rate 18, height 1.778 m (5' 10\"), weight 86.2 kg (190 lb 0.6 oz), SpO2 96%.    Relevant Results  Results for orders placed or performed during the hospital encounter of 06/04/25 (from the past 24 hours)   CBC and Auto Differential   Result Value Ref Range    WBC 9.1 4.4 - 11.3 x10*3/uL    nRBC 0.0 0.0 - 0.0 /100 WBCs    RBC 4.72 4.50 - 5.90 x10*6/uL    Hemoglobin 14.5 13.5 - 17.5 g/dL    Hematocrit 44.0 41.0 - 52.0 %    MCV 93 80 - 100 fL    MCH 30.7 26.0 - 34.0 pg    MCHC 33.0 32.0 - 36.0 g/dL    RDW 12.5 11.5 - 14.5 %    Platelets 323 150 - 450 x10*3/uL    Neutrophils % 72.6 40.0 - 80.0 %    Immature Granulocytes %, Automated 0.3 0.0 - 0.9 %    Lymphocytes % 15.1 13.0 - 44.0 %    Monocytes % 9.1 2.0 - 10.0 %    Eosinophils % 1.9 0.0 - 6.0 %    Basophils % 1.0 0.0 - 2.0 %    Neutrophils Absolute 6.57 (H) 1.60 - 5.50 x10*3/uL    Immature Granulocytes Absolute, Automated 0.03 0.00 - 0.50 x10*3/uL    Lymphocytes Absolute 1.37 0.80 - 3.00 x10*3/uL    Monocytes Absolute 0.82 (H) 0.05 - 0.80 x10*3/uL    Eosinophils Absolute 0.17 0.00 - 0.40 x10*3/uL    Basophils Absolute 0.09 0.00 - 0.10 x10*3/uL   Magnesium   Result Value Ref Range    Magnesium 1.74 1.60 - 2.40 mg/dL   Comprehensive metabolic panel   Result Value Ref Range    Glucose 290 (H) 74 - 99 mg/dL    Sodium 128 (L) 136 - 145 mmol/L    Potassium 5.4 (H) 3.5 - 5.3 mmol/L    Chloride 92 (L) 98 - 107 mmol/L    Bicarbonate 22 21 - 32 mmol/L    Anion Gap 19 10 - 20 mmol/L    Urea Nitrogen 29 (H) 6 - 23 mg/dL    Creatinine 1.72 (H) 0.50 - 1.30 mg/dL    eGFR 41 (L) >60 mL/min/1.73m*2    Calcium 10.1 8.6 - 10.3 mg/dL    Albumin 4.4 3.4 - 5.0 g/dL    Alkaline Phosphatase 92 33 - 136 U/L    Total Protein 7.4 6.4 - 8.2 g/dL    AST 15 9 - 39 U/L    Bilirubin, Total 0.6 0.0 - 1.2 mg/dL    ALT 16 10 - 52 U/L   BLOOD GAS VENOUS FULL PANEL   Result Value Ref Range    POCT pH, Venous 7.46 (H) 7.33 - 7.43 pH    POCT pCO2, Venous 32 (L) 41 - 51 mm " Hg    POCT pO2, Venous 50 (H) 35 - 45 mm Hg    POCT SO2, Venous 85 (H) 45 - 75 %    POCT Oxy Hemoglobin, Venous 82.7 (H) 45.0 - 75.0 %    POCT Hematocrit Calculated, Venous 45.0 41.0 - 52.0 %    POCT Sodium, Venous 125 (L) 136 - 145 mmol/L    POCT Potassium, Venous 5.4 (H) 3.5 - 5.3 mmol/L    POCT Chloride, Venous 92 (L) 98 - 107 mmol/L    POCT Ionized Calicum, Venous 1.14 1.10 - 1.33 mmol/L    POCT Glucose, Venous 319 (H) 74 - 99 mg/dL    POCT Lactate, Venous 4.6 (HH) 0.4 - 2.0 mmol/L    POCT Base Excess, Venous -0.2 -2.0 - 3.0 mmol/L    POCT HCO3 Calculated, Venous 22.8 22.0 - 26.0 mmol/L    POCT Hemoglobin, Venous 15.1 13.5 - 17.5 g/dL    POCT Anion Gap, Venous 16.0 10.0 - 25.0 mmol/L    Patient Temperature 37.0 degrees Celsius    FiO2 21 %    Critical Called By ELIGIO HDZ, RRT     Critical Called To Dr. GOYAL     Critical Call Time 1837     Critical Read Back Y    Beta Hydroxybutyrate   Result Value Ref Range    Beta-Hydroxybutyrate 0.23 0.02 - 0.27 mmol/L   Troponin I, High Sensitivity   Result Value Ref Range    Troponin I, High Sensitivity 16 0 - 20 ng/L   ECG 12 lead   Result Value Ref Range    Ventricular Rate 87 BPM    Atrial Rate 87 BPM    NC Interval 314 ms    QRS Duration 132 ms    QT Interval 356 ms    QTC Calculation(Bazett) 428 ms    P Axis -6 degrees    R Axis -72 degrees    T Axis -6 degrees    QRS Count 15 beats    Q Onset 211 ms    P Onset 54 ms    P Offset 127 ms    T Offset 389 ms    QTC Fredericia 402 ms   POCT GLUCOSE   Result Value Ref Range    POCT Glucose 183 (H) 74 - 99 mg/dL   Urinalysis with Reflex Culture and Microscopic   Result Value Ref Range    Color, Urine Light-Yellow Light-Yellow, Yellow, Dark-Yellow    Appearance, Urine Clear Clear    Specific Gravity, Urine 1.007 1.005 - 1.035    pH, Urine 6.5 5.0, 5.5, 6.0, 6.5, 7.0, 7.5, 8.0    Protein, Urine NEGATIVE NEGATIVE, 10 (TRACE), 20 (TRACE) mg/dL    Glucose, Urine 300 (3+) (A) Normal mg/dL    Blood, Urine NEGATIVE NEGATIVE  mg/dL    Ketones, Urine NEGATIVE NEGATIVE mg/dL    Bilirubin, Urine NEGATIVE NEGATIVE mg/dL    Urobilinogen, Urine Normal Normal mg/dL    Nitrite, Urine NEGATIVE NEGATIVE    Leukocyte Esterase, Urine NEGATIVE NEGATIVE   Extra Urine Gray Tube   Result Value Ref Range    Extra Tube 293    Osmolality, urine   Result Value Ref Range    Osmolality, Urine Random 284 200 - 1,200 mOsm/kg   Sodium, Urine Random   Result Value Ref Range    Sodium, Urine Random 44 mmol/L    Creatinine, Urine Random 46.7 20.0 - 370.0 mg/dL    Sodium/Creatinine Ratio 94 Not established. mmol/g Creat   POCT GLUCOSE   Result Value Ref Range    POCT Glucose 197 (H) 74 - 99 mg/dL   B-Type Natriuretic Peptide   Result Value Ref Range    BNP 45 0 - 99 pg/mL   TSH with reflex to Free T4 if abnormal   Result Value Ref Range    Thyroid Stimulating Hormone 1.73 0.44 - 3.98 mIU/L   Osmolality   Result Value Ref Range    Osmolality, Serum 292 280 - 300 mOsm/kg   Lipid Panel   Result Value Ref Range    Cholesterol 193 0 - 199 mg/dL    HDL-Cholesterol 31.6 mg/dL    Cholesterol/HDL Ratio 6.1     LDL Calculated 121 (H) <=99 mg/dL    VLDL 40 0 - 40 mg/dL    Triglycerides 202 (H) 0 - 149 mg/dL    Non HDL Cholesterol 161 (H) 0 - 149 mg/dL   CBC   Result Value Ref Range    WBC 5.1 4.4 - 11.3 x10*3/uL    nRBC 0.0 0.0 - 0.0 /100 WBCs    RBC 4.22 (L) 4.50 - 5.90 x10*6/uL    Hemoglobin 12.8 (L) 13.5 - 17.5 g/dL    Hematocrit 39.9 (L) 41.0 - 52.0 %    MCV 95 80 - 100 fL    MCH 30.3 26.0 - 34.0 pg    MCHC 32.1 32.0 - 36.0 g/dL    RDW 12.6 11.5 - 14.5 %    Platelets 254 150 - 450 x10*3/uL   Basic metabolic panel   Result Value Ref Range    Glucose 200 (H) 74 - 99 mg/dL    Sodium 133 (L) 136 - 145 mmol/L    Potassium 4.5 3.5 - 5.3 mmol/L    Chloride 97 (L) 98 - 107 mmol/L    Bicarbonate 29 21 - 32 mmol/L    Anion Gap 12 10 - 20 mmol/L    Urea Nitrogen 26 (H) 6 - 23 mg/dL    Creatinine 1.21 0.50 - 1.30 mg/dL    eGFR 63 >60 mL/min/1.73m*2    Calcium 9.5 8.6 - 10.3 mg/dL    Coagulation Screen   Result Value Ref Range    Protime 11.1 9.8 - 12.4 seconds    INR 1.0 0.9 - 1.1    aPTT 30 26 - 36 seconds   Transthoracic Echo Complete   Result Value Ref Range    BSA 2.06 m2     CT head wo IV contrast  Result Date: 6/4/2025  Interpreted By:  Scout Castillo, STUDY: CT HEAD WO IV CONTRAST; CT CERVICAL SPINE WO IV CONTRAST;  6/4/2025 7:10 pm   INDICATION: Signs/Symptoms:ams     COMPARISON: CT head 12/15/2021.   ACCESSION NUMBER(S): [id number]; [id number]   ORDERING CLINICIAN: JOSE GOYAL   TECHNIQUE: Axial noncontrast CT images of head with coronal and sagittal reconstructed images. Axial noncontrast CT images of the cervical spine with coronal and sagittal reconstructed images.   FINDINGS: CT HEAD:   BRAIN PARENCHYMA: Gray-white differentiation is preserved. No mass-effect, midline shift or effacement of cerebral sulci. Scattered periventricular and subcortical white matter hypodensities, nonspecific but often seen in the setting of chronic microangiopathic change.   HEMORRHAGE: No acute intracranial hemorrhage.   VENTRICLES and EXTRA-AXIAL SPACES: The ventricles and sulci are within normal limits for brain volume. No abnormal extra-axial fluid collection.   ORBITS: The visualized orbits and globes are within normal limits.   EXTRACRANIAL SOFT TISSUES: Within normal limits.   PARANASAL SINUSES/MASTOIDS: The visualized paranasal sinuses and mastoid air cells are well aerated.   CALVARIUM: No depressed skull fracture.     CT CERVICAL SPINE:   CRANIOCERVICAL JUNCTION: Intact.   ALIGNMENT: No traumatic malalignment or traumatic facet widening.   VERTEBRAE/DISC SPACES: No acute fracture. Vertebral body heights are maintained. Moderate-to-severe multilevel disc space height loss and associated degenerative endplate changes, greatest at C5-T1. No high grade spinal canal stenosis evident by CT.   SOFT TISSUES: Within normal limits.   OTHER: The visualized lung apices are clear.       CT  HEAD: 1. No acute intracranial abnormality or calvarial fracture.     CT CERVICAL SPINE: 1. No acute fracture or traumatic malalignment of the cervical spine.   MACRO: None   Signed by: Scout Castillo 6/4/2025 7:23 PM Dictation workstation:   [id number]    CT cervical spine wo IV contrast  Result Date: 6/4/2025  Interpreted By:  Scout Castillo, STUDY: CT HEAD WO IV CONTRAST; CT CERVICAL SPINE WO IV CONTRAST;  6/4/2025 7:10 pm   INDICATION: Signs/Symptoms:ams     COMPARISON: CT head 12/15/2021.   ACCESSION NUMBER(S): [id number]; [id number]   ORDERING CLINICIAN: JOSE GOYAL   TECHNIQUE: Axial noncontrast CT images of head with coronal and sagittal reconstructed images. Axial noncontrast CT images of the cervical spine with coronal and sagittal reconstructed images.   FINDINGS: CT HEAD:   BRAIN PARENCHYMA: Gray-white differentiation is preserved. No mass-effect, midline shift or effacement of cerebral sulci. Scattered periventricular and subcortical white matter hypodensities, nonspecific but often seen in the setting of chronic microangiopathic change.   HEMORRHAGE: No acute intracranial hemorrhage.   VENTRICLES and EXTRA-AXIAL SPACES: The ventricles and sulci are within normal limits for brain volume. No abnormal extra-axial fluid collection.   ORBITS: The visualized orbits and globes are within normal limits.   EXTRACRANIAL SOFT TISSUES: Within normal limits.   PARANASAL SINUSES/MASTOIDS: The visualized paranasal sinuses and mastoid air cells are well aerated.   CALVARIUM: No depressed skull fracture.     CT CERVICAL SPINE:   CRANIOCERVICAL JUNCTION: Intact.   ALIGNMENT: No traumatic malalignment or traumatic facet widening.   VERTEBRAE/DISC SPACES: No acute fracture. Vertebral body heights are maintained. Moderate-to-severe multilevel disc space height loss and associated degenerative endplate changes, greatest at C5-T1. No high grade spinal canal stenosis evident by CT.   SOFT TISSUES: Within normal  limits.   OTHER: The visualized lung apices are clear.       CT HEAD: 1. No acute intracranial abnormality or calvarial fracture.     CT CERVICAL SPINE: 1. No acute fracture or traumatic malalignment of the cervical spine.   MACRO: None   Signed by: Scout Castillo 6/4/2025 7:23 PM Dictation workstation:   [id number]    XR chest 1 view  Result Date: 6/4/2025  STUDY: Chest Radiograph;  6/4/2025 6:34 PM INDICATION: Altered mental status. COMPARISON: None Available. ACCESSION NUMBER(S): [id number] ORDERING CLINICIAN: JOSE GOYAL TECHNIQUE:  Frontal chest was obtained at 18:34 hours. FINDINGS: CARDIOMEDIASTINAL SILHOUETTE: Cardiomediastinal silhouette is normal in size and configuration.  LUNGS: Lungs are clear.  ABDOMEN: No remarkable upper abdominal findings.  BONES: No acute osseous changes.    No acute process. Signed by Heri Bowling MD     Assessment & Plan  LUDA (acute kidney injury)    Aphasia    Altered mental status    Hyponatremia    Hyperkalemia    Dehydration    Hyperglycemia    74 year old male who presented to ED with aphasia and confusion. Concern for TIA/Stroke. Some lab abnormalities noted. Replete electrolytes. Continue IV fluids. Neurology consulted. MRI brain/MRA head and neck ordered. PT/OT/SLP/SW ordered. Patient to be admitted to hospital for further medical management.     Altered mental status, resolved  Aphasia, resolved  LUDA  Hyponatremia  Hyperkalemia  Hyperglycemia  Dehydration  Hx of stroke  -Admit to OBS/telemetry to Dr. Burnham   -Neurology consult and appreciate recs  -See imaging results   -MRI brain/MRA head and neck ordered (concern for stroke/TIA)  -Echocardiogram ordered  -2L LR given in ED. Continue LR@ 75ml/hr x1L  -NIH-0  -Continue aspirin, Plavix, statin  -Replete electrolytes  -Strict I&O  -UA ordered   -Hold Lasix   -PT/OT/SLP/SW ordered  -Serum and urine osmolality, sodium urine spot ordered  -Repeat labs in AM (lipid panel, TSH, hemoglobin a1c)     Chronic  conditions  #DMII, HLD, b/l carotid artery stenosis  -SSI with hypoglycemic protocol  -Full code      DVT prophylaxis   -Heparin SQ  -SCD's as tolerated     I spent 65 minutes in the professional and overall care of this patient.  Patient fully evaluated on June 4.  Correct electrolytes and monitor overnight.  Plan as above.  Wilfredo Burnham MD         [1]   Past Surgical History:  Procedure Laterality Date    CT ANGIO NECK  12/14/2021    CT NECK ANGIO W AND WO IV CONTRAST 12/14/2021 [facility] CL[facility]ICAL [facility]GAC[facility]    CT HEAD ANGIO W AND WO IV CONTRAST  12/14/2021    CT HEAD ANGIO W AND WO IV CONTRAST 12/14/2021 [facility] CLINICAL LEGAC[facility]    MR HEAD ANGIO WO IV CONTRAST  12/13/2021    MR HEAD ANGIO WO IV CONTRAST 12/13/2021 [facility] CLINICAL LEGAC[facility]    MR NECK ANGIO WO IV CONTRAST  12/13/2021    MR NECK ANGIO WO IV CONTRAST 12/13/2021 [facility] CLINICAL LEGAC[facility]    OTHER SURGICAL HISTORY  12/23/2021    Carotid thromboendarterectomy   [2] No family history on file.

## 2025-06-05 NOTE — PROGRESS NOTES
Speech-Language Pathology                 Therapy Communication Note    Patient Name: Modesto Lorenz  MRN: [id number]  Department: PAR 3  Room: 328/328-A  Today's Date: 6/5/2025     Discipline: Speech Language Pathology    Comment:   Order received for aphasia/dysarthria concerns coming from home, concern for stroke.   Pt is back to baseline, no difficulty with word finding; speech is intelligible, pt makes eye contact, easily converses in conversation.   Pt is [place] at baseline.   Of note, pt reports he did not start talking until he was 6 years old.      No formal communication assessment indicated at this time. Will dc the order placed at admission prior to work-up.     MR brain wo iv contrast 6/5  IMPRESSION:  1.  No evidence of acute infarct, intracranial mass effect or midline shift.  2. Minimally increased FLAIR hyperintensities which although  nonspecific is favored to represent chronic microvascular ischemic changes.  3. Mild-to-moderate diffuse parenchymal volume loss.

## 2025-06-05 NOTE — NURSING NOTE
06/05/25 2028 Patient Navigator   I introduced myself to the patient and explained my role. Pt states he lives with his wife and has her help as needed. He states he used to be very active however since he had his last stroke he isn't able to do much. He states his wife prepares his food and I reviewed healthy food options. I reviewed the handouts listed below, the following lab values, and what they indicate: cholesterol, HDL, LDL, triglycerides, and A1C. I gave the patient my business card & instructed him to call/email me as needed. Please see the education tab for additional information. He appreciated my visit and verbalized understanding of the above note. I have updated the patient's nurse, Eddie, of my visit.     Handouts:   Stroke booklet  [facility]  What can I eat? American Diabetes Association  Planning healthy meals- Annalisa Nordisk  How do I follow a healthy diet pattern? American Heart Association  The connection between diabetes & stroke- American Stroke Association  How can I improve my cholesterol? [place] [employer] Association    Lisa SAUCEDON, RN  Patient Navigator  Stroke Educator  Diabetes Care &

## 2025-06-05 NOTE — H&P
History Of Present Illness  Modesto Lorenz is a 74 y.o. male with a past medical history of DMII, stroke, HLD, b/l carotid artery stenosis s/p carotid shunt, who presented to [facility] ED today via EMS from home with AMS. Wife states that the patient was doing yard work outside and seem to be confused. States he was trying to stay hydrated while planting flowers outside and was taking breaks to drink fluids. His wife called and he was having difficulty getting words out, which he states was similar to when he had a stroke 3 years ago. His wife was concerned about his talking and confusion so she called EMS. He was leaning against the house on EMS arrival. His blood sugar for EMS was 375 and he was hypertensive. He apparently had dried blood under his chin and did not know where that came from. He has no memory of working outside and EMS arrival. He was back to baseline on arrival to ED. States he did have a right frontal headache while in the ED that is resolved now. States he had a fall down the steps about 3 weeks ago, but no major injury except his right side hurt afterwards. Denies hitting head. Denies any other recent falls, traumas, or injuries. Denies fever, chills, chest pain, shortness of breath, cough, abdominal pain, nausea, vomiting, urinary symptoms, diarrhea, or constipation. Previous medical records reviewed.     ER course: VS on ED arrival: 36.8C, /60, HR 95, RR 18, 97% RA. EKG unavailable for my review. Labs: glucose 290. Sodium 128, chloride 92. Potassium 5.4. BUN 29, creatinine 1.72, GFR 41. Troponin 16. Neutropphils 6.57. UA ordered. See imaging results below. 2L LR given in ED. Patient will be admitted under the care of Dr. Burnham who will continue to follow. I was asked to H&P and place initial admission orders.      Past Medical History  As above    Surgical History  Surgical History[1]     Social History  He reports that he quit smoking about 4 years ago. His smoking use included cigarettes.  He has quit using smokeless tobacco. No history on file for alcohol use and drug use. Lives with wife. Ambulates independently.    Family History  Family History[2]     Allergies  Patient has no known allergies.    Review of Systems  10 Point review of systems was performed and is negative except for what is stated in the HPI above.    Physical Exam  Constitutional:       Appearance: Normal appearance.      Comments: Pleasant elderly male resting comfortably in bed. No distress noted. Hard of hearing.   HENT:      Head: Normocephalic and atraumatic.      Nose: Nose normal.      Mouth/Throat:      Mouth: Mucous membranes are moist.      Pharynx: Oropharynx is clear.   Eyes:      Extraocular Movements: Extraocular movements intact.      Conjunctiva/sclera: Conjunctivae normal.      Pupils: Pupils are equal, round, and reactive to light.   Cardiovascular:      Rate and Rhythm: Normal rate and regular rhythm.      Pulses: Normal pulses.      Heart sounds: Normal heart sounds.   Pulmonary:      Effort: Pulmonary effort is normal.      Breath sounds: Normal breath sounds.   Abdominal:      General: Abdomen is flat. Bowel sounds are normal.      Palpations: Abdomen is soft.   Musculoskeletal:         General: Normal range of motion.      Cervical back: Normal range of motion and neck supple.      Right lower leg: Edema present.      Left lower leg: Edema present.   Skin:     General: Skin is warm and dry.      Capillary Refill: Capillary refill takes less than 2 seconds.      Comments: Chronic venous stasis to BLE with edema. Scattered ecchymosis to b/l upper extremities.  Dried blood noted to chin without notable source of where it came from.   Neurological:      General: No focal deficit present.      Mental Status: He is alert and oriented to person, place, and time.      Comments: NIH-0   Psychiatric:         Mood and Affect: Mood normal.         Behavior: Behavior normal.         Thought Content: Thought content  "normal.         Judgment: Judgment normal.          Last Recorded Vitals  Blood pressure 141/70, pulse 82, temperature 36.8 °C (98.2 °F), temperature source Temporal, resp. rate 18, height 1.778 m (5' 10\"), weight 86.2 kg (190 lb), SpO2 98%.    Relevant Results  Results for orders placed or performed during the hospital encounter of 06/04/25 (from the past 24 hours)   CBC and Auto Differential   Result Value Ref Range    WBC 9.1 4.4 - 11.3 x10*3/uL    nRBC 0.0 0.0 - 0.0 /100 WBCs    RBC 4.72 4.50 - 5.90 x10*6/uL    Hemoglobin 14.5 13.5 - 17.5 g/dL    Hematocrit 44.0 41.0 - 52.0 %    MCV 93 80 - 100 fL    MCH 30.7 26.0 - 34.0 pg    MCHC 33.0 32.0 - 36.0 g/dL    RDW 12.5 11.5 - 14.5 %    Platelets 323 150 - 450 x10*3/uL    Neutrophils % 72.6 40.0 - 80.0 %    Immature Granulocytes %, Automated 0.3 0.0 - 0.9 %    Lymphocytes % 15.1 13.0 - 44.0 %    Monocytes % 9.1 2.0 - 10.0 %    Eosinophils % 1.9 0.0 - 6.0 %    Basophils % 1.0 0.0 - 2.0 %    Neutrophils Absolute 6.57 (H) 1.60 - 5.50 x10*3/uL    Immature Granulocytes Absolute, Automated 0.03 0.00 - 0.50 x10*3/uL    Lymphocytes Absolute 1.37 0.80 - 3.00 x10*3/uL    Monocytes Absolute 0.82 (H) 0.05 - 0.80 x10*3/uL    Eosinophils Absolute 0.17 0.00 - 0.40 x10*3/uL    Basophils Absolute 0.09 0.00 - 0.10 x10*3/uL   Magnesium   Result Value Ref Range    Magnesium 1.74 1.60 - 2.40 mg/dL   Comprehensive metabolic panel   Result Value Ref Range    Glucose 290 (H) 74 - 99 mg/dL    Sodium 128 (L) 136 - 145 mmol/L    Potassium 5.4 (H) 3.5 - 5.3 mmol/L    Chloride 92 (L) 98 - 107 mmol/L    Bicarbonate 22 21 - 32 mmol/L    Anion Gap 19 10 - 20 mmol/L    Urea Nitrogen 29 (H) 6 - 23 mg/dL    Creatinine 1.72 (H) 0.50 - 1.30 mg/dL    eGFR 41 (L) >60 mL/min/1.73m*2    Calcium 10.1 8.6 - 10.3 mg/dL    Albumin 4.4 3.4 - 5.0 g/dL    Alkaline Phosphatase 92 33 - 136 U/L    Total Protein 7.4 6.4 - 8.2 g/dL    AST 15 9 - 39 U/L    Bilirubin, Total 0.6 0.0 - 1.2 mg/dL    ALT 16 10 - 52 U/L "   Beta Hydroxybutyrate   Result Value Ref Range    Beta-Hydroxybutyrate 0.23 0.02 - 0.27 mmol/L   Troponin I, High Sensitivity   Result Value Ref Range    Troponin I, High Sensitivity 16 0 - 20 ng/L     CT head wo IV contrast  Result Date: 6/4/2025  Interpreted By:  Scout Castillo, STUDY: CT HEAD WO IV CONTRAST; CT CERVICAL SPINE WO IV CONTRAST;  6/4/2025 7:10 pm   INDICATION: Signs/Symptoms:ams     COMPARISON: CT head 12/15/2021.   ACCESSION NUMBER(S): [id number]; [id number]   ORDERING CLINICIAN: JOSE GOYAL   TECHNIQUE: Axial noncontrast CT images of head with coronal and sagittal reconstructed images. Axial noncontrast CT images of the cervical spine with coronal and sagittal reconstructed images.   FINDINGS: CT HEAD:   BRAIN PARENCHYMA: Gray-white differentiation is preserved. No mass-effect, midline shift or effacement of cerebral sulci. Scattered periventricular and subcortical white matter hypodensities, nonspecific but often seen in the setting of chronic microangiopathic change.   HEMORRHAGE: No acute intracranial hemorrhage.   VENTRICLES and EXTRA-AXIAL SPACES: The ventricles and sulci are within normal limits for brain volume. No abnormal extra-axial fluid collection.   ORBITS: The visualized orbits and globes are within normal limits.   EXTRACRANIAL SOFT TISSUES: Within normal limits.   PARANASAL SINUSES/MASTOIDS: The visualized paranasal sinuses and mastoid air cells are well aerated.   CALVARIUM: No depressed skull fracture.     CT CERVICAL SPINE:   CRANIOCERVICAL JUNCTION: Intact.   ALIGNMENT: No traumatic malalignment or traumatic facet widening.   VERTEBRAE/DISC SPACES: No acute fracture. Vertebral body heights are maintained. Moderate-to-severe multilevel disc space height loss and associated degenerative endplate changes, greatest at C5-T1. No high grade spinal canal stenosis evident by CT.   SOFT TISSUES: Within normal limits.   OTHER: The visualized lung apices are clear.       CT  HEAD: 1. No acute intracranial abnormality or calvarial fracture.     CT CERVICAL SPINE: 1. No acute fracture or traumatic malalignment of the cervical spine.   MACRO: None   Signed by: Scout Castillo 6/4/2025 7:23 PM Dictation workstation:   [id number]    CT cervical spine wo IV contrast  Result Date: 6/4/2025  Interpreted By:  Scout Castillo, STUDY: CT HEAD WO IV CONTRAST; CT CERVICAL SPINE WO IV CONTRAST;  6/4/2025 7:10 pm   INDICATION: Signs/Symptoms:ams     COMPARISON: CT head 12/15/2021.   ACCESSION NUMBER(S): [id number]; [id number]   ORDERING CLINICIAN: JOSE GOYAL   TECHNIQUE: Axial noncontrast CT images of head with coronal and sagittal reconstructed images. Axial noncontrast CT images of the cervical spine with coronal and sagittal reconstructed images.   FINDINGS: CT HEAD:   BRAIN PARENCHYMA: Gray-white differentiation is preserved. No mass-effect, midline shift or effacement of cerebral sulci. Scattered periventricular and subcortical white matter hypodensities, nonspecific but often seen in the setting of chronic microangiopathic change.   HEMORRHAGE: No acute intracranial hemorrhage.   VENTRICLES and EXTRA-AXIAL SPACES: The ventricles and sulci are within normal limits for brain volume. No abnormal extra-axial fluid collection.   ORBITS: The visualized orbits and globes are within normal limits.   EXTRACRANIAL SOFT TISSUES: Within normal limits.   PARANASAL SINUSES/MASTOIDS: The visualized paranasal sinuses and mastoid air cells are well aerated.   CALVARIUM: No depressed skull fracture.     CT CERVICAL SPINE:   CRANIOCERVICAL JUNCTION: Intact.   ALIGNMENT: No traumatic malalignment or traumatic facet widening.   VERTEBRAE/DISC SPACES: No acute fracture. Vertebral body heights are maintained. Moderate-to-severe multilevel disc space height loss and associated degenerative endplate changes, greatest at C5-T1. No high grade spinal canal stenosis evident by CT.   SOFT TISSUES: Within normal  limits.   OTHER: The visualized lung apices are clear.       CT HEAD: 1. No acute intracranial abnormality or calvarial fracture.     CT CERVICAL SPINE: 1. No acute fracture or traumatic malalignment of the cervical spine.   MACRO: None   Signed by: Scout Castillo 6/4/2025 7:23 PM Dictation workstation:   [id number]    XR chest 1 view  Result Date: 6/4/2025  STUDY: Chest Radiograph;  6/4/2025 6:34 PM INDICATION: Altered mental status. COMPARISON: None Available. ACCESSION NUMBER(S): [id number] ORDERING CLINICIAN: JOSE GOYAL TECHNIQUE:  Frontal chest was obtained at 18:34 hours. FINDINGS: CARDIOMEDIASTINAL SILHOUETTE: Cardiomediastinal silhouette is normal in size and configuration.  LUNGS: Lungs are clear.  ABDOMEN: No remarkable upper abdominal findings.  BONES: No acute osseous changes.    No acute process. Signed by Heri Bowling MD     Assessment & Plan  LUDA (acute kidney injury)    Aphasia    Altered mental status    Hyponatremia    Hyperkalemia    Dehydration    Hyperglycemia    74 year old male who presented to ED with aphasia and confusion. Concern for TIA/Stroke. Some lab abnormalities noted. Replete electrolytes. Continue IV fluids. Neurology consulted. MRI brain/MRA head and neck ordered. PT/OT/SLP/SW ordered. Patient to be admitted to hospital for further medical management.     Altered mental status, resolved  Aphasia, resolved  LUDA  Hyponatremia  Hyperkalemia  Hyperglycemia  Dehydration  Hx of stroke  -Admit to OBS/telemetry to Dr. Burnham   -Neurology consult and appreciate recs  -See imaging results   -MRI brain/MRA head and neck ordered (concern for stroke/TIA)  -Echocardiogram ordered  -2L LR given in ED. Continue LR@ 75ml/hr x1L  -NIH-0  -Continue aspirin, Plavix, statin  -Replete electrolytes  -Strict I&O  -UA ordered   -Hold Lasix   -PT/OT/SLP/SW ordered  -Serum and urine osmolality, sodium urine spot ordered  -Repeat labs in AM (lipid panel, TSH, hemoglobin a1c)     Chronic  conditions  #DMII, HLD, b/l carotid artery stenosis  -SSI with hypoglycemic protocol  -Full code      DVT prophylaxis   -Heparin SQ  -SCD's as tolerated     I spent 40 minutes in the professional and overall care of this patient.    BILL Escobar-CNP         [1]   Past Surgical History:  Procedure Laterality Date    CT ANGIO NECK  12/14/2021    CT NECK ANGIO W AND WO IV CONTRAST 12/14/2021 [facility]    CT HEAD ANGIO W AND WO IV CONTRAST  12/14/2021    CT HEAD ANGIO W AND WO IV CONTRAST 12/14/2021 [facility]    MR HEAD ANGIO WO IV CONTRAST  12/13/2021    MR HEAD ANGIO WO IV CONTRAST 12/13/2021 [facility]    MR NECK ANGIO WO IV CONTRAST  12/13/2021    MR NECK ANGIO WO IV CONTRAST 12/13/2021 [facility]    OTHER SURGICAL HISTORY  12/23/2021    Carotid thromboendarterectomy   [2] No family history on file.

## 2025-06-05 NOTE — CARE PLAN
The patient's goals for the shift include safety    The clinical goals for the shift include prevent further injury

## 2025-06-05 NOTE — CONSULTS
Inpatient consult to Neurology  Consult performed by: JUAN Bull  Consult ordered by: JUAN Ecsobar          History Of Present Illness  Modesto Lorenz is a 74 y.o. male presenting with  past medical history of DMII, stroke, HLD, b/l carotid artery stenosis s/p carotid shunt, who presented to [facility] ED today via EMS from home with AMS. Wife states that the patient was doing yard work outside and seem to be confused. States he was trying to stay hydrated while planting flowers outside and was taking breaks to drink fluids. His wife called and he was having difficulty getting words out, which he states was similar to when he had a stroke 3 years ago. His wife was concerned about his talking and confusion so she called EMS. He was leaning against the house on EMS arrival. His blood sugar for EMS was 375 and he was hypertensive. He apparently had dried blood under his chin and did not know where that came from. He has no memory of working outside and EMS arrival. He was back to baseline on arrival to ED. States he did have a right frontal headache while in the ED that is resolved now. States he had a fall down the steps about 3 weeks ago, but no major injury except his right side hurt afterwards. Denies hitting head. Denies any other recent falls, traumas, or injuries. Denies fever, chills, chest pain, shortness of breath, cough, abdominal pain, nausea, vomiting, urinary symptoms, diarrhea, or constipation.  He does take dual antiplatelet therapy plus statin 40 mg p.o. daily at home for CVA prophylaxis.     ER course: CVA alert was not activated as last known well was greater than 4.5 hours in duration, NIH score of 0, no focal deficits were appreciated, and patient had return to mental baseline upon arrival to this facility. VS on ED arrival: 36.8C, /60, HR 95, RR 18, 97% RA. EKG unavailable for my review. Labs: glucose 290. Sodium 128, chloride 92. Potassium 5.4. BUN 29, creatinine  1.72, GFR 41. Troponin 16. Neutropphils 6.57. UA ordered. See imaging results below. 2L LR given in ED. CT head without contrast completed, in addition to CT C-spine without contrast as there was question as to whether or not patient might have sustained a fall, with both negative for any acute findings.  Remainder of CVA care path initiated and general neurology consulted for question of TIA/CVA.    A 10 point review of systems was completed and negative otherwise noted above in HPI.    Past Medical History  Medical History[1]  Surgical History  Surgical History[2]  Social History  Social History[3]  Allergies  Patient has no known allergies.  Prescriptions Prior to Admission[4]  Family History[5]    Physical exam/neurological exam  Patient seen and examined at this time; upon entering room he is resting quietly in bed. Appears fully developed and well nourished.   Mental status: A&Ox 3. Memory testing, fund of knowledge and concentration within normal limits. Speech is fluent and negative for any paraphrasic errors.     Cranial Nerves:  Optic II/ Oculomotor III: Fundoscopic exam was technically difficult. PERRL +2. Visual fields are full. Convergence and accomodation noted without difficulty. Negative for deficits to visual acuity confrontation via static-finger wiggle test. Eyes appear aligned and free of exophthalmos and ptosis. Sclera are white bilaterally and lens are free from clouding.   Oculomotor III/ Trochlear IV/ Abducens VI: Extraocular movements are full, with no evidence of nystagmus. Negative for diplopia.   Trigeminal V: Facial sensation is intact to light touch. Corneal reflex responsive when threatened bilaterally.  Facial VII: intact; nose is midline, with no evidence of flattening to nasolabial folds noted and mouth is negative for evidence of droop. Patient is successfully able to follow commands to raise eyebrows, squeeze eyes shut, smile and show teeth, frown, and puff out cheeks.   Acoustic  "VIII: Hearing is intact bilaterally.  Glossopharngyeal IX/ Vagus X: Palate elevates symmetrically to phonation. Findings are negative for uvula deviation or dysphagia.   Spinal accessory XI: Sternocleidomastoid/ upper trapezius is 5/5 to strength testing. No asymmetry noted to strength, bulk, or tone.   Hypoglossal XII: Tongue is midline and without deviations. Phonation is articulate and is negative for findings of dysarthria or aphasia.     Motor exam: negative for evidence of involuntary movements or fasiculations. BUE flexion of biceps and brachioradialis graded 5/5, in addition to extension of triceps at elbow and wrists. BUE  strength 5/5, along with finger abduction and thumb opposition. BLE hip flexion, extension, adduction, and abduction 5/5. Knee extension & flexion 5/5. Ankle dorsiflexion and plantarflexion 5/5. Normal bulk and normal tone.     Sensory exam: Sensation is intact to light touch throughout.    Reflexes: Reflexes are 2+ and symmetric. Bilateral plantar responses are flexor. Ankle jerks symmetric.     Coordination: finger-to-nose testing is negative for signs of dysmetria. Pronator drift testing to BUE negative. Rapid alternating hand movements WNL.    Gait exam: negative for ataxia.    Last Recorded Vitals  Blood pressure 116/59, pulse 67, temperature 36.3 °C (97.3 °F), temperature source Temporal, resp. rate 18, height 1.778 m (5' 10\"), weight 86.2 kg (190 lb 0.6 oz), SpO2 96%.    Relevant Results  Scheduled medications  Scheduled Medications[6]  Continuous medications  Continuous Medications[7]  PRN medications  PRN Medications[8]  MR angio head wo IV contrast  Result Date: 6/5/2025  Interpreted By:  Reggie Nuñez, STUDY: MR ANGIO NECK WO IV CONTRAST; MR ANGIO HEAD WO IV CONTRAST;  6/5/2025 8:55 am   INDICATION: Signs/Symptoms:Aphasia/AMS.     COMPARISON: MRA dated 12/13/2021   ACCESSION NUMBER(S): [id number]; [id number]   ORDERING CLINICIAN: GAIL IVORY   TECHNIQUE: " Time-of-flight MRA images of the neck and intracranial vessels were obtained.   FINDINGS: The MRA of the neck demonstrates no significant stenosis along the bilateral carotid bifurcations or visualized cervical segment of the right dominant vertebral artery. There is again evidence of a small caliber left vertebral artery with segmental areas of diminished MRA signal/MRA signal dropout along expected more distal course of the cervical left vertebral artery suspicious for segmental occlusion, marked narrowing, and/or markedly diminished flow.   The intracranial MRA demonstrates an asymmetrically small caliber M1 segment of the left middle cerebral artery with asymmetric diminished visualization of M2 and more distal branch vessels of the left middle cerebral artery when compared with the right more pronounced when compared with the prior intracranial MRA dated 12/13/2021. There is again evidence of a segmental area of diminished MRA signal/MRA signal dropout along the proximal A1 segment of the left anterior cerebral artery suspicious for segmental narrowing and/or occlusion. There are additional segmental areas of diminished MRA signal noted along the proximal A1 segment of the right anterior cerebral artery as well as A1 and A2 segments of the right posterior cerebral artery suspicious for segmental areas of narrowing in these regions as well. There is a segmental area of diminished MRA signal raising the possibility of segmental narrowing and/or artifact along a more distal branch vessel of the left posterior cerebral artery. There is lack of MRA signal expected location of the distal left vertebral artery caudal to the origin of the left posterior inferior cerebellar artery.       The MRA of the neck demonstrates no significant stenosis along the bilateral carotid bifurcations or visualized cervical segment of the right dominant vertebral artery. There is again evidence of a small caliber left vertebral artery  with segmental areas of diminished MRA signal/MRA signal dropout along expected more distal course of the cervical left vertebral artery suspicious for segmental occlusion, marked narrowing, and/or markedly diminished flow.   The intracranial MRA demonstrates an asymmetrically small caliber M1 segment of the left middle cerebral artery with asymmetric diminished visualization of M2 and more distal branch vessels of the left middle cerebral artery when compared with the right more pronounced when compared with the prior intracranial MRA dated 12/13/2021. There is again evidence of a segmental area of diminished MRA signal/MRA signal dropout along the proximal A1 segment of the left anterior cerebral artery suspicious for segmental narrowing and/or occlusion. There are additional segmental areas of diminished MRA signal noted along the proximal A1 segment of the right anterior cerebral artery as well as A1 and A2 segments of the right posterior cerebral artery suspicious for segmental areas of narrowing in these regions as well. There is a segmental area of diminished MRA signal raising the possibility of segmental narrowing and/or artifact along a more distal branch vessel of the left posterior cerebral artery. There is lack of MRA signal expected location of the distal left vertebral artery caudal to the origin of the left posterior inferior cerebellar artery.   MACRO: Critical Finding:  See findings. Notification was initiated on 6/5/2025 at 9:50 am by  Reggie Nuñez.  (**-OCF-**)   Signed by: Reggie Nuñez 6/5/2025 9:50 AM Dictation workstation:   [id number]    MR angio neck wo IV contrast  Result Date: 6/5/2025  Interpreted By:  Reggie Nuñez, STUDY: MR ANGIO NECK WO IV CONTRAST; MR ANGIO HEAD WO IV CONTRAST;  6/5/2025 8:55 am   INDICATION: Signs/Symptoms:Aphasia/AMS.     COMPARISON: MRA dated 12/13/2021   ACCESSION NUMBER(S): [id number]; [id number]   ORDERING CLINICIAN: GAIL IVORY   TECHNIQUE:  with segmental areas of diminished MRA signal/MRA signal dropout along expected more distal course of the cervical left vertebral artery suspicious for segmental occlusion, marked narrowing, and/or markedly diminished flow.   The intracranial MRA demonstrates an asymmetrically small caliber M1 segment of the left middle cerebral artery with asymmetric diminished visualization of M2 and more distal branch vessels of the left middle cerebral artery when compared with the right more pronounced when compared with the prior intracranial MRA dated 12/13/2021. There is again evidence of a segmental area of diminished MRA signal/MRA signal dropout along the proximal A1 segment of the left anterior cerebral artery suspicious for segmental narrowing and/or occlusion. There are additional segmental areas of diminished MRA signal noted along the proximal A1 segment of the right anterior cerebral artery as well as A1 and A2 segments of the right posterior cerebral artery suspicious for segmental areas of narrowing in these regions as well. There is a segmental area of diminished MRA signal raising the possibility of segmental narrowing and/or artifact along a more distal branch vessel of the left posterior cerebral artery. There is lack of MRA signal expected location of the distal left vertebral artery caudal to the origin of the left posterior inferior cerebellar artery.   MACRO: Critical Finding:  See findings. Notification was initiated on 6/5/2025 at 9:50 am by  Reggie Nuñez.  (**-OCF-**)   Signed by: Reggie Nuñez 6/5/2025 9:50 AM Dictation workstation:   [id number]    MR brain wo IV contrast  Result Date: 6/5/2025  Interpreted By:  Liam Galvez and Omar Mahmoud STUDY: MR BRAIN WO IV CONTRAST;  6/5/2025 8:55 am   INDICATION: Signs/Symptoms:aphasia/AMS.     COMPARISON: CT head 06/04/2025, brain MR 12/15/2021   ACCESSION NUMBER(S): [id number]   ORDERING CLINICIAN: GAIL IVORY   TECHNIQUE: Axial T2, FLAIR,  DWI, gradient echo T2 and sagittal and coronal T1 weighted images of brain were acquired.   FINDINGS: CSF Spaces: The ventricles, sulci and basal cisterns are within normal limits.   Parenchyma: There is no diffusion restriction abnormality to suggest acute infarct.  There is no mass effect or midline shift. Gradient echo T2 weighted images fail to reveal the presence of hemorrhage or hemosiderin deposition. There is mild-to-moderate diffuse parenchymal volume loss with associated widening of the sulci. There are scattered foci of increased signal in the subcortical and periventricular white matter best appreciated on FLAIR images, minimally increased as compared to prior brain MR; although nonspecific, the FLAIR hyperintensities are favored to represent sequela of chronic microvascular ischemic changes.   Paranasal Sinuses and Mastoids: Minimal mucosal thickening of the right maxillary sinus. Otherwise, the visualized paranasal sinuses and mastoid air cells are unremarkable.       1.  No evidence of acute infarct, intracranial mass effect or midline shift. 2. Minimally increased FLAIR hyperintensities which although nonspecific is favored to represent chronic microvascular ischemic changes. 3. Mild-to-moderate diffuse parenchymal volume loss.   I personally reviewed the images/study and I agree with the findings as stated by Asif Allred MD (PGY-2). This study was interpreted at [place], [place].   MACRO: None   Signed by: Liam Galvez 6/5/2025 9:34 AM Dictation workstation:   [id number]    ECG 12 lead  Result Date: 6/5/2025  Sinus rhythm with sinus arrhythmia with 1st degree AV block Left axis deviation Right bundle branch block Abnormal ECG When compared with ECG of 12-DEC-2021 21:57, PREVIOUS ECG IS PRESENT    CT head wo IV contrast  Result Date: 6/4/2025  Interpreted By:  Scout Castillo, STUDY: CT HEAD WO IV CONTRAST; CT CERVICAL SPINE WO IV CONTRAST;   6/4/2025 7:10 pm   INDICATION: Signs/Symptoms:ams     COMPARISON: CT head 12/15/2021.   ACCESSION NUMBER(S): [id number]; [id number]   ORDERING CLINICIAN: JOSE GOYAL   TECHNIQUE: Axial noncontrast CT images of head with coronal and sagittal reconstructed images. Axial noncontrast CT images of the cervical spine with coronal and sagittal reconstructed images.   FINDINGS: CT HEAD:   BRAIN PARENCHYMA: Gray-white differentiation is preserved. No mass-effect, midline shift or effacement of cerebral sulci. Scattered periventricular and subcortical white matter hypodensities, nonspecific but often seen in the setting of chronic microangiopathic change.   HEMORRHAGE: No acute intracranial hemorrhage.   VENTRICLES and EXTRA-AXIAL SPACES: The ventricles and sulci are within normal limits for brain volume. No abnormal extra-axial fluid collection.   ORBITS: The visualized orbits and globes are within normal limits.   EXTRACRANIAL SOFT TISSUES: Within normal limits.   PARANASAL SINUSES/MASTOIDS: The visualized paranasal sinuses and mastoid air cells are well aerated.   CALVARIUM: No depressed skull fracture.     CT CERVICAL SPINE:   CRANIOCERVICAL JUNCTION: Intact.   ALIGNMENT: No traumatic malalignment or traumatic facet widening.   VERTEBRAE/DISC SPACES: No acute fracture. Vertebral body heights are maintained. Moderate-to-severe multilevel disc space height loss and associated degenerative endplate changes, greatest at C5-T1. No high grade spinal canal stenosis evident by CT.   SOFT TISSUES: Within normal limits.   OTHER: The visualized lung apices are clear.       CT HEAD: 1. No acute intracranial abnormality or calvarial fracture.     CT CERVICAL SPINE: 1. No acute fracture or traumatic malalignment of the cervical spine.   MACRO: None   Signed by: Scout Castillo 6/4/2025 7:23 PM Dictation workstation:   [id number]    CT cervical spine wo IV contrast  Result Date: 6/4/2025  Interpreted By:  Scout Castillo,  STUDY: CT HEAD WO IV CONTRAST; CT CERVICAL SPINE WO IV CONTRAST;  6/4/2025 7:10 pm   INDICATION: Signs/Symptoms:ams     COMPARISON: CT head 12/15/2021.   ACCESSION NUMBER(S): [id number]; [id number]   ORDERING CLINICIAN: JOSE GOYAL   TECHNIQUE: Axial noncontrast CT images of head with coronal and sagittal reconstructed images. Axial noncontrast CT images of the cervical spine with coronal and sagittal reconstructed images.   FINDINGS: CT HEAD:   BRAIN PARENCHYMA: Gray-white differentiation is preserved. No mass-effect, midline shift or effacement of cerebral sulci. Scattered periventricular and subcortical white matter hypodensities, nonspecific but often seen in the setting of chronic microangiopathic change.   HEMORRHAGE: No acute intracranial hemorrhage.   VENTRICLES and EXTRA-AXIAL SPACES: The ventricles and sulci are within normal limits for brain volume. No abnormal extra-axial fluid collection.   ORBITS: The visualized orbits and globes are within normal limits.   EXTRACRANIAL SOFT TISSUES: Within normal limits.   PARANASAL SINUSES/MASTOIDS: The visualized paranasal sinuses and mastoid air cells are well aerated.   CALVARIUM: No depressed skull fracture.     CT CERVICAL SPINE:   CRANIOCERVICAL JUNCTION: Intact.   ALIGNMENT: No traumatic malalignment or traumatic facet widening.   VERTEBRAE/DISC SPACES: No acute fracture. Vertebral body heights are maintained. Moderate-to-severe multilevel disc space height loss and associated degenerative endplate changes, greatest at C5-T1. No high grade spinal canal stenosis evident by CT.   SOFT TISSUES: Within normal limits.   OTHER: The visualized lung apices are clear.       CT HEAD: 1. No acute intracranial abnormality or calvarial fracture.     CT CERVICAL SPINE: 1. No acute fracture or traumatic malalignment of the cervical spine.   MACRO: None   Signed by: Scout Castillo 6/4/2025 7:23 PM Dictation workstation:   [id number]    XR chest 1 view  Result Date:  6/4/2025  STUDY: Chest Radiograph;  6/4/2025 6:34 PM INDICATION: Altered mental status. COMPARISON: None Available. ACCESSION NUMBER(S): [id number] ORDERING CLINICIAN: JOSE GOYAL TECHNIQUE:  Frontal chest was obtained at 18:34 hours. FINDINGS: CARDIOMEDIASTINAL SILHOUETTE: Cardiomediastinal silhouette is normal in size and configuration.  LUNGS: Lungs are clear.  ABDOMEN: No remarkable upper abdominal findings.  BONES: No acute osseous changes.    No acute process. Signed by Heri Bowling MD    Results for orders placed or performed during the hospital encounter of 06/04/25 (from the past 24 hours)   CBC and Auto Differential   Result Value Ref Range    WBC 9.1 4.4 - 11.3 x10*3/uL    nRBC 0.0 0.0 - 0.0 /100 WBCs    RBC 4.72 4.50 - 5.90 x10*6/uL    Hemoglobin 14.5 13.5 - 17.5 g/dL    Hematocrit 44.0 41.0 - 52.0 %    MCV 93 80 - 100 fL    MCH 30.7 26.0 - 34.0 pg    MCHC 33.0 32.0 - 36.0 g/dL    RDW 12.5 11.5 - 14.5 %    Platelets 323 150 - 450 x10*3/uL    Neutrophils % 72.6 40.0 - 80.0 %    Immature Granulocytes %, Automated 0.3 0.0 - 0.9 %    Lymphocytes % 15.1 13.0 - 44.0 %    Monocytes % 9.1 2.0 - 10.0 %    Eosinophils % 1.9 0.0 - 6.0 %    Basophils % 1.0 0.0 - 2.0 %    Neutrophils Absolute 6.57 (H) 1.60 - 5.50 x10*3/uL    Immature Granulocytes Absolute, Automated 0.03 0.00 - 0.50 x10*3/uL    Lymphocytes Absolute 1.37 0.80 - 3.00 x10*3/uL    Monocytes Absolute 0.82 (H) 0.05 - 0.80 x10*3/uL    Eosinophils Absolute 0.17 0.00 - 0.40 x10*3/uL    Basophils Absolute 0.09 0.00 - 0.10 x10*3/uL   Magnesium   Result Value Ref Range    Magnesium 1.74 1.60 - 2.40 mg/dL   Comprehensive metabolic panel   Result Value Ref Range    Glucose 290 (H) 74 - 99 mg/dL    Sodium 128 (L) 136 - 145 mmol/L    Potassium 5.4 (H) 3.5 - 5.3 mmol/L    Chloride 92 (L) 98 - 107 mmol/L    Bicarbonate 22 21 - 32 mmol/L    Anion Gap 19 10 - 20 mmol/L    Urea Nitrogen 29 (H) 6 - 23 mg/dL    Creatinine 1.72 (H) 0.50 - 1.30 mg/dL    eGFR 41 (L)  >60 mL/min/1.73m*2    Calcium 10.1 8.6 - 10.3 mg/dL    Albumin 4.4 3.4 - 5.0 g/dL    Alkaline Phosphatase 92 33 - 136 U/L    Total Protein 7.4 6.4 - 8.2 g/dL    AST 15 9 - 39 U/L    Bilirubin, Total 0.6 0.0 - 1.2 mg/dL    ALT 16 10 - 52 U/L   BLOOD GAS VENOUS FULL PANEL   Result Value Ref Range    POCT pH, Venous 7.46 (H) 7.33 - 7.43 pH    POCT pCO2, Venous 32 (L) 41 - 51 mm Hg    POCT pO2, Venous 50 (H) 35 - 45 mm Hg    POCT SO2, Venous 85 (H) 45 - 75 %    POCT Oxy Hemoglobin, Venous 82.7 (H) 45.0 - 75.0 %    POCT Hematocrit Calculated, Venous 45.0 41.0 - 52.0 %    POCT Sodium, Venous 125 (L) 136 - 145 mmol/L    POCT Potassium, Venous 5.4 (H) 3.5 - 5.3 mmol/L    POCT Chloride, Venous 92 (L) 98 - 107 mmol/L    POCT Ionized Calicum, Venous 1.14 1.10 - 1.33 mmol/L    POCT Glucose, Venous 319 (H) 74 - 99 mg/dL    POCT Lactate, Venous 4.6 (HH) 0.4 - 2.0 mmol/L    POCT Base Excess, Venous -0.2 -2.0 - 3.0 mmol/L    POCT HCO3 Calculated, Venous 22.8 22.0 - 26.0 mmol/L    POCT Hemoglobin, Venous 15.1 13.5 - 17.5 g/dL    POCT Anion Gap, Venous 16.0 10.0 - 25.0 mmol/L    Patient Temperature 37.0 degrees Celsius    FiO2 21 %    Critical Called By ELIGIO HDZ, RRT     Critical Called To Dr. GOYAL     Critical Call Time 6047     Critical Read Back Y    Beta Hydroxybutyrate   Result Value Ref Range    Beta-Hydroxybutyrate 0.23 0.02 - 0.27 mmol/L   Troponin I, High Sensitivity   Result Value Ref Range    Troponin I, High Sensitivity 16 0 - 20 ng/L   ECG 12 lead   Result Value Ref Range    Ventricular Rate 87 BPM    Atrial Rate 87 BPM    AZ Interval 314 ms    QRS Duration 132 ms    QT Interval 356 ms    QTC Calculation(Bazett) 428 ms    P Axis -6 degrees    R Axis -72 degrees    T Axis -6 degrees    QRS Count 15 beats    Q Onset 211 ms    P Onset 54 ms    P Offset 127 ms    T Offset 389 ms    QTC Fredericia 402 ms   POCT GLUCOSE   Result Value Ref Range    POCT Glucose 183 (H) 74 - 99 mg/dL   Urinalysis with Reflex Culture  and Microscopic   Result Value Ref Range    Color, Urine Light-Yellow Light-Yellow, Yellow, Dark-Yellow    Appearance, Urine Clear Clear    Specific Gravity, Urine 1.007 1.005 - 1.035    pH, Urine 6.5 5.0, 5.5, 6.0, 6.5, 7.0, 7.5, 8.0    Protein, Urine NEGATIVE NEGATIVE, 10 (TRACE), 20 (TRACE) mg/dL    Glucose, Urine 300 (3+) (A) Normal mg/dL    Blood, Urine NEGATIVE NEGATIVE mg/dL    Ketones, Urine NEGATIVE NEGATIVE mg/dL    Bilirubin, Urine NEGATIVE NEGATIVE mg/dL    Urobilinogen, Urine Normal Normal mg/dL    Nitrite, Urine NEGATIVE NEGATIVE    Leukocyte Esterase, Urine NEGATIVE NEGATIVE   Extra Urine Gray Tube   Result Value Ref Range    Extra Tube 293    Osmolality, urine   Result Value Ref Range    Osmolality, Urine Random 284 200 - 1,200 mOsm/kg   Sodium, Urine Random   Result Value Ref Range    Sodium, Urine Random 44 mmol/L    Creatinine, Urine Random 46.7 20.0 - 370.0 mg/dL    Sodium/Creatinine Ratio 94 Not established. mmol/g Creat   POCT GLUCOSE   Result Value Ref Range    POCT Glucose 197 (H) 74 - 99 mg/dL   B-Type Natriuretic Peptide   Result Value Ref Range    BNP 45 0 - 99 pg/mL   TSH with reflex to Free T4 if abnormal   Result Value Ref Range    Thyroid Stimulating Hormone 1.73 0.44 - 3.98 mIU/L   Osmolality   Result Value Ref Range    Osmolality, Serum 292 280 - 300 mOsm/kg   Lipid Panel   Result Value Ref Range    Cholesterol 193 0 - 199 mg/dL    HDL-Cholesterol 31.6 mg/dL    Cholesterol/HDL Ratio 6.1     LDL Calculated 121 (H) <=99 mg/dL    VLDL 40 0 - 40 mg/dL    Triglycerides 202 (H) 0 - 149 mg/dL    Non HDL Cholesterol 161 (H) 0 - 149 mg/dL   CBC   Result Value Ref Range    WBC 5.1 4.4 - 11.3 x10*3/uL    nRBC 0.0 0.0 - 0.0 /100 WBCs    RBC 4.22 (L) 4.50 - 5.90 x10*6/uL    Hemoglobin 12.8 (L) 13.5 - 17.5 g/dL    Hematocrit 39.9 (L) 41.0 - 52.0 %    MCV 95 80 - 100 fL    MCH 30.3 26.0 - 34.0 pg    MCHC 32.1 32.0 - 36.0 g/dL    RDW 12.6 11.5 - 14.5 %    Platelets 254 150 - 450 x10*3/uL   Basic  metabolic panel   Result Value Ref Range    Glucose 200 (H) 74 - 99 mg/dL    Sodium 133 (L) 136 - 145 mmol/L    Potassium 4.5 3.5 - 5.3 mmol/L    Chloride 97 (L) 98 - 107 mmol/L    Bicarbonate 29 21 - 32 mmol/L    Anion Gap 12 10 - 20 mmol/L    Urea Nitrogen 26 (H) 6 - 23 mg/dL    Creatinine 1.21 0.50 - 1.30 mg/dL    eGFR 63 >60 mL/min/1.73m*2    Calcium 9.5 8.6 - 10.3 mg/dL   Coagulation Screen   Result Value Ref Range    Protime 11.1 9.8 - 12.4 seconds    INR 1.0 0.9 - 1.1    aPTT 30 26 - 36 seconds   Transthoracic Echo Complete   Result Value Ref Range    BSA 2.06 m2             NIH Stroke Scale  1A. Level of Consciousness: Alert, Keenly Responsive  1B. Ask Month and Age: Both Questions Right  1C. Blink Eyes & Squeeze Hands: Performs Both Tasks  2. Best Gaze: Normal  3. Visual: No Visual Loss  4. Facial Palsy: Normal Symmetrical Movements  5A. Motor - Left Arm: No Drift  5B. Motor - Right Arm: No Drift  6A. Motor - Left Leg: No Drift  6B. Motor - Right Leg: No Drift  7. Limb Ataxia: Absent  8. Sensory Loss: Normal  9. Best Language: No Aphasia  10. Dysarthria: Normal  11. Extinction and Inattention: No Abnormality  NIH Stroke Scale: 0           [place] Coma Scale  Best Eye Response: Spontaneous  Best Verbal Response: Oriented  Best Motor Response: Follows commands  [place] Coma Scale Score: 15                 I have personally reviewed the following imaging results:   Imaging  MR angio head wo IV contrast  Result Date: 6/5/2025  The MRA of the neck demonstrates no significant stenosis along the bilateral carotid bifurcations or visualized cervical segment of the right dominant vertebral artery. There is again evidence of a small caliber left vertebral artery with segmental areas of diminished MRA signal/MRA signal dropout along expected more distal course of the cervical left vertebral artery suspicious for segmental occlusion, marked narrowing, and/or markedly diminished flow.   The intracranial MRA demonstrates  an asymmetrically small caliber M1 segment of the left middle cerebral artery with asymmetric diminished visualization of M2 and more distal branch vessels of the left middle cerebral artery when compared with the right more pronounced when compared with the prior intracranial MRA dated 12/13/2021. There is again evidence of a segmental area of diminished MRA signal/MRA signal dropout along the proximal A1 segment of the left anterior cerebral artery suspicious for segmental narrowing and/or occlusion. There are additional segmental areas of diminished MRA signal noted along the proximal A1 segment of the right anterior cerebral artery as well as A1 and A2 segments of the right posterior cerebral artery suspicious for segmental areas of narrowing in these regions as well. There is a segmental area of diminished MRA signal raising the possibility of segmental narrowing and/or artifact along a more distal branch vessel of the left posterior cerebral artery. There is lack of MRA signal expected location of the distal left vertebral artery caudal to the origin of the left posterior inferior cerebellar artery.   MACRO: Critical Finding:  See findings. Notification was initiated on 6/5/2025 at 9:50 am by  Reggie Nuñez.  (**-OCF-**)   Signed by: Reggie Nuñez 6/5/2025 9:50 AM Dictation workstation:   [id number]    MR angio neck wo IV contrast  Result Date: 6/5/2025  The MRA of the neck demonstrates no significant stenosis along the bilateral carotid bifurcations or visualized cervical segment of the right dominant vertebral artery. There is again evidence of a small caliber left vertebral artery with segmental areas of diminished MRA signal/MRA signal dropout along expected more distal course of the cervical left vertebral artery suspicious for segmental occlusion, marked narrowing, and/or markedly diminished flow.   The intracranial MRA demonstrates an asymmetrically small caliber M1 segment of the left middle cerebral  artery with asymmetric diminished visualization of M2 and more distal branch vessels of the left middle cerebral artery when compared with the right more pronounced when compared with the prior intracranial MRA dated 12/13/2021. There is again evidence of a segmental area of diminished MRA signal/MRA signal dropout along the proximal A1 segment of the left anterior cerebral artery suspicious for segmental narrowing and/or occlusion. There are additional segmental areas of diminished MRA signal noted along the proximal A1 segment of the right anterior cerebral artery as well as A1 and A2 segments of the right posterior cerebral artery suspicious for segmental areas of narrowing in these regions as well. There is a segmental area of diminished MRA signal raising the possibility of segmental narrowing and/or artifact along a more distal branch vessel of the left posterior cerebral artery. There is lack of MRA signal expected location of the distal left vertebral artery caudal to the origin of the left posterior inferior cerebellar artery.   MACRO: Critical Finding:  See findings. Notification was initiated on 6/5/2025 at 9:50 am by  Reggie Nuñez.  (**-OCF-**)   Signed by: Reggie Nuñez 6/5/2025 9:50 AM Dictation workstation:   [id number]    MR brain wo IV contrast  Result Date: 6/5/2025  1.  No evidence of acute infarct, intracranial mass effect or midline shift. 2. Minimally increased FLAIR hyperintensities which although nonspecific is favored to represent chronic microvascular ischemic changes. 3. Mild-to-moderate diffuse parenchymal volume loss.   I personally reviewed the images/study and I agree with the findings as stated by Asif Allred MD (PGY-2). This study was interpreted at [facility], [place], [place].   MACRO: None   Signed by: Liam Galvez 6/5/2025 9:34 AM Dictation workstation:   [id number]    CT head wo IV contrast  Result Date: 6/4/2025  CT HEAD: 1. No acute  intracranial abnormality or calvarial fracture.     CT CERVICAL SPINE: 1. No acute fracture or traumatic malalignment of the cervical spine.   MACRO: None   Signed by: Scout Castillo 6/4/2025 7:23 PM Dictation workstation:   [id number]    CT cervical spine wo IV contrast  Result Date: 6/4/2025  CT HEAD: 1. No acute intracranial abnormality or calvarial fracture.     CT CERVICAL SPINE: 1. No acute fracture or traumatic malalignment of the cervical spine.   MACRO: None   Signed by: Scout Castillo 6/4/2025 7:23 PM Dictation workstation:   [id number]    XR chest 1 view  Result Date: 6/4/2025  No acute process. Signed by Heri Bowling MD      Cardiology, Vascular, and Other Imaging  ECG 12 lead  Result Date: 6/5/2025  Sinus rhythm with sinus arrhythmia with 1st degree AV block Left axis deviation Right bundle branch block Abnormal ECG When compared with ECG of 12-DEC-2021 21:57, PREVIOUS ECG IS PRESENT         Assessment/Plan   Assessment & Plan  LUDA (acute kidney injury)    Aphasia    Altered mental status    Hyponatremia    Hyperkalemia    Dehydration    Hyperglycemia    Patient is a 74-year-old male who presented to [facility] for altered mental status, amnesia, and confusion while working in his garden for many hours in the hot sun.  He does have a history of CVA and has been maintained on dual antiplatelet therapy plus high intensity statin for noted intracranial stenosis and carotid stenosis status post CEA.  Stroke alert was not activated upon arrival to this facility due to patient returning back to neurologic baseline, NIH is 0, and last known well being greater than 4.5 hours in duration.  MRI is negative for acute CVA.  Suspect heatstroke in this patient attributed to dehydration as labs confirm likelihood with hyperkalemia at 5.4, hyponatremia at 128 and bun 29, creat 1.72, and GFR 41 with differential including TIA attributed to worsening intracranial stenosis.  LUDA has resolved after IV fluid  administration.    - Patient to continue dual antiplatelet therapy as prescribed for CVA prophylaxis.  Given evidence of worsening intracranial stenosis in this patient, it is recommended for atorvastatin to be increased to 80 mg p.o. daily.  -Echocardiogram pleated with results pending; recommend for patient to wear a 14-day continuous cardiac event monitor at discharge to rule out evidence of cardiac arrhythmias that could have attributed to possible fall/suspected unconsciousness.  -Recommendations for needs during hospitalization and at discharge via PT and OT  -Educate patient about limiting time in the hot sun+ physical exertion, and if this cannot be avoided, patient needs to increase water intake while outside.  -Continue promotion of lifestyle modifications, such as: Strict BP and glycemic control, dietary habit changes, incorporation of daily exercise regimen, adherence to all prescription/OTC medication schedules, attendance to all follow-up appointments, cessation from smoking if applicable, abstinence from alcohol and illicit drug use if applicable  -Patient to follow-up with Dr. Steven Collins, vascular neurology, for findings of worsening intracranial stenosis.  - We will sign off as CVA care path is complete.  Thank you for allowing us to serve as part of this patient's multidisciplinary treatment team.  If any additional questions or concerns arise, please do not hesitate to contact us.         I spent 80 minutes in the professional and overall care of this patient.      Sejal Lane, APRN-CNP         [1] No past medical history on file.  [2]   Past Surgical History:  Procedure Laterality Date    CT ANGIO NECK  12/14/2021    CT NECK ANGIO W AND WO IV CONTRAST 12/14/2021 [facility] CLINICAL LEGACY    CT HEAD ANGIO W AND WO IV CONTRAST  12/14/2021    CT HEAD ANGIO W AND WO IV CONTRAST 12/14/2021 [facility] CLINICAL LEGACY    MR HEAD ANGIO WO IV CONTRAST  12/13/2021    MR HEAD ANGIO WO IV CONTRAST 12/13/2021 [facility]  CLINICAL LEGACY    MR NECK ANGIO WO IV CONTRAST  2021    MR NECK ANGIO WO IV CONTRAST 2021 [facility] CLINICAL LEGACY    OTHER SURGICAL HISTORY  2021    Carotid thromboendarterectomy   [3]   Social History  Tobacco Use    Smoking status: Former     Current packs/day: 0.00     Types: Cigarettes     Quit date:      Years since quittin.4    Smokeless tobacco: Former   [4]   Medications Prior to Admission   Medication Sig Dispense Refill Last Dose/Taking    DULoxetine (Cymbalta) 60 mg DR capsule Take 1 capsule (60 mg) by mouth once daily.   Taking    furosemide (Lasix) 20 mg tablet Take 1 tablet (20 mg) by mouth once daily.   Taking    aspirin 81 mg chewable tablet Chew 1 tablet (81 mg) once daily.       atorvastatin (Lipitor) 40 mg tablet Take 1 tablet (40 mg) by mouth once daily.       clopidogrel (Plavix) 75 mg tablet Take 1 tablet (75 mg) by mouth once daily.       docusate sodium (Colace) 100 mg capsule Take 1 capsule (100 mg) by mouth once daily.       fluticasone (Flonase) 50 mcg/actuation nasal spray Administer 2 sprays into each nostril once daily.       hydrALAZINE (Apresoline) 50 mg tablet Take 1 tablet (50 mg) by mouth 2 times a day.       lisinopril 20 mg tablet Take 1 tablet (20 mg) by mouth once daily.       metFORMIN (Glucophage) 500 mg tablet Take 1 tablet (500 mg) by mouth 2 times a day.       sertraline (Zoloft) 100 mg tablet Take 1 tablet (100 mg) by mouth once daily.      [5] No family history on file.  [6] aspirin, 81 mg, oral, Daily  atorvastatin, 40 mg, oral, Nightly  clopidogrel, 75 mg, oral, Daily  heparin (porcine), 5,000 Units, subcutaneous, q8h SARAHI  insulin lispro, 0-10 Units, subcutaneous, TID AC  polyethylene glycol, 17 g, oral, Daily  [7] lactated Ringer's, 75 mL/hr, Last Rate: 75 mL/hr (25 1044)  [8] PRN medications: acetaminophen, dextrose, dextrose, glucagon, glucagon, labetaloL, oxygen

## 2025-06-05 NOTE — CARE PLAN
The patient's goals for the shift include safety    The clinical goals for the shift include prevent further injury    Over the shift, the patient did not make progress toward the following goals. Barriers to progression include rule out stroke. Recommendations to address these barriers include monitoring.

## 2025-06-06 VITALS
RESPIRATION RATE: 18 BRPM | WEIGHT: 190.04 LBS | SYSTOLIC BLOOD PRESSURE: 134 MMHG | HEART RATE: 87 BPM | DIASTOLIC BLOOD PRESSURE: 75 MMHG | HEIGHT: 70 IN | BODY MASS INDEX: 27.21 KG/M2 | OXYGEN SATURATION: 94 % | TEMPERATURE: 97.7 F

## 2025-06-06 LAB
ALBUMIN SERPL BCP-MCNC: 3.9 G/DL (ref 3.4–5)
ALP SERPL-CCNC: 77 U/L (ref 33–136)
ALT SERPL W P-5'-P-CCNC: 12 U/L (ref 10–52)
ANION GAP SERPL CALC-SCNC: 13 MMOL/L (ref 10–20)
AST SERPL W P-5'-P-CCNC: 14 U/L (ref 9–39)
BILIRUB SERPL-MCNC: 0.5 MG/DL (ref 0–1.2)
BUN SERPL-MCNC: 21 MG/DL (ref 6–23)
CALCIUM SERPL-MCNC: 9.3 MG/DL (ref 8.6–10.3)
CHLORIDE SERPL-SCNC: 100 MMOL/L (ref 98–107)
CO2 SERPL-SCNC: 26 MMOL/L (ref 21–32)
CREAT SERPL-MCNC: 0.99 MG/DL (ref 0.5–1.3)
EGFRCR SERPLBLD CKD-EPI 2021: 80 ML/MIN/1.73M*2
ERYTHROCYTE [DISTWIDTH] IN BLOOD BY AUTOMATED COUNT: 12.6 % (ref 11.5–14.5)
GLUCOSE BLD MANUAL STRIP-MCNC: 182 MG/DL (ref 74–99)
GLUCOSE SERPL-MCNC: 179 MG/DL (ref 74–99)
HCT VFR BLD AUTO: 41.4 % (ref 41–52)
HGB BLD-MCNC: 13.2 G/DL (ref 13.5–17.5)
MCH RBC QN AUTO: 30.8 PG (ref 26–34)
MCHC RBC AUTO-ENTMCNC: 31.9 G/DL (ref 32–36)
MCV RBC AUTO: 97 FL (ref 80–100)
MYOGLOBIN SERPL-MCNC: 88 UG/L
NRBC BLD-RTO: 0 /100 WBCS (ref 0–0)
PHOSPHATE SERPL-MCNC: 3.1 MG/DL (ref 2.5–4.9)
PLATELET # BLD AUTO: 244 X10*3/UL (ref 150–450)
POTASSIUM SERPL-SCNC: 4.1 MMOL/L (ref 3.5–5.3)
PROT SERPL-MCNC: 6.7 G/DL (ref 6.4–8.2)
PTH-INTACT SERPL-MCNC: 38.6 PG/ML (ref 18.5–88)
RBC # BLD AUTO: 4.29 X10*6/UL (ref 4.5–5.9)
SODIUM SERPL-SCNC: 135 MMOL/L (ref 136–145)
URATE SERPL-MCNC: 6.6 MG/DL (ref 4–7.5)
WBC # BLD AUTO: 4.2 X10*3/UL (ref 4.4–11.3)

## 2025-06-06 PROCEDURE — 84154 ASSAY OF PSA FREE: CPT | Performed by: INTERNAL MEDICINE

## 2025-06-06 PROCEDURE — 83970 ASSAY OF PARATHORMONE: CPT | Mod: PARLAB | Performed by: INTERNAL MEDICINE

## 2025-06-06 PROCEDURE — 2500000001 HC RX 250 WO HCPCS SELF ADMINISTERED DRUGS (ALT 637 FOR MEDICARE OP)

## 2025-06-06 PROCEDURE — 36415 COLL VENOUS BLD VENIPUNCTURE: CPT | Performed by: INTERNAL MEDICINE

## 2025-06-06 PROCEDURE — 2500000002 HC RX 250 W HCPCS SELF ADMINISTERED DRUGS (ALT 637 FOR MEDICARE OP, ALT 636 FOR OP/ED): Performed by: INTERNAL MEDICINE

## 2025-06-06 PROCEDURE — 84550 ASSAY OF BLOOD/URIC ACID: CPT | Performed by: INTERNAL MEDICINE

## 2025-06-06 PROCEDURE — 2500000002 HC RX 250 W HCPCS SELF ADMINISTERED DRUGS (ALT 637 FOR MEDICARE OP, ALT 636 FOR OP/ED): Performed by: NURSE PRACTITIONER

## 2025-06-06 PROCEDURE — 2500000001 HC RX 250 WO HCPCS SELF ADMINISTERED DRUGS (ALT 637 FOR MEDICARE OP): Performed by: INTERNAL MEDICINE

## 2025-06-06 PROCEDURE — G0378 HOSPITAL OBSERVATION PER HR: HCPCS

## 2025-06-06 PROCEDURE — 80053 COMPREHEN METABOLIC PANEL: CPT | Performed by: INTERNAL MEDICINE

## 2025-06-06 PROCEDURE — 82947 ASSAY GLUCOSE BLOOD QUANT: CPT

## 2025-06-06 PROCEDURE — 2500000004 HC RX 250 GENERAL PHARMACY W/ HCPCS (ALT 636 FOR OP/ED)

## 2025-06-06 PROCEDURE — 85027 COMPLETE CBC AUTOMATED: CPT | Performed by: INTERNAL MEDICINE

## 2025-06-06 PROCEDURE — 2500000001 HC RX 250 WO HCPCS SELF ADMINISTERED DRUGS (ALT 637 FOR MEDICARE OP): Performed by: NURSE PRACTITIONER

## 2025-06-06 PROCEDURE — 96372 THER/PROPH/DIAG INJ SC/IM: CPT

## 2025-06-06 PROCEDURE — 84100 ASSAY OF PHOSPHORUS: CPT | Performed by: INTERNAL MEDICINE

## 2025-06-06 PROCEDURE — 2500000002 HC RX 250 W HCPCS SELF ADMINISTERED DRUGS (ALT 637 FOR MEDICARE OP, ALT 636 FOR OP/ED)

## 2025-06-06 PROCEDURE — 83874 ASSAY OF MYOGLOBIN: CPT | Mod: PARLAB | Performed by: INTERNAL MEDICINE

## 2025-06-06 RX ORDER — TAMSULOSIN HYDROCHLORIDE 0.4 MG/1
0.4 CAPSULE ORAL DAILY
Status: DISCONTINUED | OUTPATIENT
Start: 2025-06-06 | End: 2025-06-06 | Stop reason: HOSPADM

## 2025-06-06 RX ORDER — HYDRALAZINE HYDROCHLORIDE 25 MG/1
25 TABLET, FILM COATED ORAL 2 TIMES DAILY
Status: DISCONTINUED | OUTPATIENT
Start: 2025-06-06 | End: 2025-06-06 | Stop reason: HOSPADM

## 2025-06-06 RX ORDER — HYDRALAZINE HYDROCHLORIDE 25 MG/1
25 TABLET, FILM COATED ORAL 2 TIMES DAILY
Qty: 60 TABLET | Refills: 0 | Status: SHIPPED | OUTPATIENT
Start: 2025-06-06 | End: 2025-07-06

## 2025-06-06 RX ORDER — GLIMEPIRIDE 4 MG/1
4 TABLET ORAL
Qty: 30 TABLET | Refills: 0 | Status: SHIPPED | OUTPATIENT
Start: 2025-06-06 | End: 2025-07-06

## 2025-06-06 RX ORDER — TAMSULOSIN HYDROCHLORIDE 0.4 MG/1
0.4 CAPSULE ORAL DAILY
Qty: 30 CAPSULE | Refills: 0 | Status: SHIPPED | OUTPATIENT
Start: 2025-06-07 | End: 2025-07-07

## 2025-06-06 RX ADMIN — CLOPIDOGREL BISULFATE 75 MG: 75 TABLET, FILM COATED ORAL at 09:45

## 2025-06-06 RX ADMIN — METFORMIN HYDROCHLORIDE 500 MG: 500 TABLET ORAL at 09:45

## 2025-06-06 RX ADMIN — INSULIN LISPRO 2 UNITS: 100 INJECTION, SOLUTION INTRAVENOUS; SUBCUTANEOUS at 09:45

## 2025-06-06 RX ADMIN — TAMSULOSIN HYDROCHLORIDE 0.4 MG: 0.4 CAPSULE ORAL at 12:24

## 2025-06-06 RX ADMIN — DULOXETINE 60 MG: 30 CAPSULE, DELAYED RELEASE ORAL at 09:45

## 2025-06-06 RX ADMIN — ASPIRIN 81 MG: 81 TABLET, COATED ORAL at 09:45

## 2025-06-06 RX ADMIN — DOCUSATE SODIUM 100 MG: 100 CAPSULE, LIQUID FILLED ORAL at 09:45

## 2025-06-06 RX ADMIN — POLYETHYLENE GLYCOL 3350 17 G: 17 POWDER, FOR SOLUTION ORAL at 09:45

## 2025-06-06 RX ADMIN — GLIMEPIRIDE 4 MG: 4 TABLET ORAL at 10:44

## 2025-06-06 RX ADMIN — HEPARIN SODIUM 5000 UNITS: 5000 INJECTION, SOLUTION INTRAVENOUS; SUBCUTANEOUS at 06:33

## 2025-06-06 RX ADMIN — LISINOPRIL 20 MG: 20 TABLET ORAL at 10:23

## 2025-06-06 RX ADMIN — HYDRALAZINE HYDROCHLORIDE 50 MG: 50 TABLET ORAL at 09:45

## 2025-06-06 ASSESSMENT — COGNITIVE AND FUNCTIONAL STATUS - GENERAL
TURNING FROM BACK TO SIDE WHILE IN FLAT BAD: A LITTLE
MOBILITY SCORE: 18
MOVING TO AND FROM BED TO CHAIR: A LITTLE
DAILY ACTIVITIY SCORE: 24
CLIMB 3 TO 5 STEPS WITH RAILING: A LITTLE
STANDING UP FROM CHAIR USING ARMS: A LITTLE
WALKING IN HOSPITAL ROOM: A LITTLE
MOVING FROM LYING ON BACK TO SITTING ON SIDE OF FLAT BED WITH BEDRAILS: A LITTLE

## 2025-06-06 NOTE — PROGRESS NOTES
25 [id number]   Discharge Planning   Living Arrangements Spouse/significant other   Support Systems Spouse/significant other   Assistance Needed None   Type of Residence Private residence   Number of Stairs to Enter Residence 3   Do you have animals or pets at home? No   Who is requesting discharge planning? Provider   Home or Post Acute Services None   Expected Discharge Disposition Home   Does the patient need discharge transport arranged? No     TCC Note: Met with pt at bedside, introduced self and explained role. Verified name, , demographics, insurance and PCP is Dr. Burnham. ER contact is wifeLisa phone  [phone]. Pt was Independent previously. Recent fall 2 weeks ago, injured left side; sore. Uses a cane prn. Recently diagnosed with diabetes, no home O2. Pharmacy is [facility] [address]. Wife will transport home. No home going needs. Melody Hernandez, MSN, RN, TCC.

## 2025-06-06 NOTE — DISCHARGE SUMMARY
Discharge Diagnosis  LUDA (acute kidney injury)       Issues Requiring Follow-Up  06/06- Patient fully examined at the bedside. Brought to hospital - had concerns including Hyperglycemia.  Kidney function improved with rehydration, HBA1C elevated @  11.8. Awake, more animated, with no acute events overnight, no new complaints. Slow but progressive improvements noted. Diet education provided, will continue with oral medications at this time, addition of amaryl/glimpepride. Patient medically stable at time of exam, cleared for discharge today.  Goals of care emphasized with patient and family, will continue current plan of care.     Discharge Meds     Medication List      START taking these medications     glimepiride 4 mg tablet; Commonly known as: Amaryl; Take 1 tablet (4 mg)   by mouth once daily with breakfast.     CONTINUE taking these medications     aspirin 81 mg chewable tablet   atorvastatin 40 mg tablet; Commonly known as: Lipitor   clopidogrel 75 mg tablet; Commonly known as: Plavix   docusate sodium 100 mg capsule; Commonly known as: Colace   DULoxetine 60 mg DR capsule; Commonly known as: Cymbalta   fluticasone 50 mcg/actuation nasal spray; Commonly known as: Flonase   hydrALAZINE 50 mg tablet; Commonly known as: Apresoline   lisinopril 20 mg tablet   metFORMIN 500 mg tablet; Commonly known as: Glucophage   sertraline 100 mg tablet; Commonly known as: Zoloft     STOP taking these medications     furosemide 20 mg tablet; Commonly known as: Lasix       Test Results Pending At Discharge  Pending Labs       Order Current Status    Myoglobin In process    PSA, total and free In process    Parathyroid Hormone, Intact In process            Hospital Course           Wilfredo Burnham MD   Physician  Internal Medicine     H&P      Addendum     Date of Service: 6/4/2025 10:01 PM     Addendum         History Of Present Illness  Modesto Lorenz is a 74 y.o. male with a past medical history of DMII, stroke, HLD, b/l carotid  artery stenosis s/p carotid shunt, who presented to [facility] ED today via EMS from home with AMS. Wife states that the patient was doing yard work outside and seem to be confused. States he was trying to stay hydrated while planting flowers outside and was taking breaks to drink fluids. His wife called and he was having difficulty getting words out, which he states was similar to when he had a stroke 3 years ago. His wife was concerned about his talking and confusion so she called EMS. He was leaning against the house on EMS arrival. His blood sugar for EMS was 375 and he was hypertensive. He apparently had dried blood under his chin and did not know where that came from. He has no memory of working outside and EMS arrival. He was back to baseline on arrival to ED. States he did have a right frontal headache while in the ED that is resolved now. States he had a fall down the steps about 3 weeks ago, but no major injury except his right side hurt afterwards. Denies hitting head. Denies any other recent falls, traumas, or injuries. Denies fever, chills, chest pain, shortness of breath, cough, abdominal pain, nausea, vomiting, urinary symptoms, diarrhea, or constipation. Previous medical records reviewed.      ER course: VS on ED arrival: 36.8C, /60, HR 95, RR 18, 97% RA. EKG unavailable for my review. Labs: glucose 290. Sodium 128, chloride 92. Potassium 5.4. BUN 29, creatinine 1.72, GFR 41. Troponin 16. Neutropphils 6.57. UA ordered. See imaging results below. 2L LR given  Past Medical History  As above     Surgical History  [Surgical History]    [Surgical History]  Past Surgical History        Procedure Laterality Date    CT ANGIO NECK   12/14/2021     CT NECK ANGIO W AND WO IV CONTRAST 12/14/2021 [facility] CLINICAL LEGACY    CT HEAD ANGIO W AND WO IV CONTRAST   12/14/2021     CT HEAD ANGIO W AND WO IV CONTRAST 12/14/2021 [facility] CLINICAL LEGACY    MR HEAD ANGIO WO IV CONTRAST   12/13/2021     MR HEAD ANGIO WO IV  CONTRAST 12/13/2021 [facility] CLINICAL LEGACY    MR NECK ANGIO WO IV CONTRAST   12/13/2021     MR NECK ANGIO WO IV CONTRAST 12/13/2021 [facility] CLINICAL LEGACY    OTHER SURGICAL HISTORY   12/23/2021     Carotid thromboendarterectomy        Social History  He reports that he quit smoking about 4 years ago. His smoking use included cigarettes. He has quit using smokeless tobacco. No history on file for alcohol use and drug use. Lives with wife. Ambulates independently.     Family History  [Family History]    [Family History]  No family history on file.     Allergies  Patient has no known allergies.     Review of Systems  10 Point review of systems was performed and is negative except for what is stated in the HPI above.    Physical Exam  Constitutional:       Appearance: Normal appearance.      Comments: Pleasant elderly male resting comfortably in bed. No distress noted. Hard of hearing.   HENT:      Head: Normocephalic and atraumatic.      Nose: Nose normal.      Mouth/Throat:      Mouth: Mucous membranes are moist.      Pharynx: Oropharynx is clear.   Eyes:      Extraocular Movements: Extraocular movements intact.      Conjunctiva/sclera: Conjunctivae normal.      Pupils: Pupils are equal, round, and reactive to light.   Cardiovascular:      Rate and Rhythm: Normal rate and regular rhythm.      Pulses: Normal pulses.      Heart sounds: Normal heart sounds.   Pulmonary:      Effort: Pulmonary effort is normal.      Breath sounds: Normal breath sounds.   Abdominal:      General: Abdomen is flat. Bowel sounds are normal.      Palpations: Abdomen is soft.   Musculoskeletal:         General: Normal range of motion.      Cervical back: Normal range of motion and neck supple.      Right lower leg: Edema present.      Left lower leg: Edema present.   Skin:     General: Skin is warm and dry.      Capillary Refill: Capillary refill takes less than 2 seconds.      Comments: Chronic venous stasis to BLE with edema. Scattered ecchymosis  "to b/l upper extremities.  Dried blood noted to chin without notable source of where it came from.   Neurological:      General: No focal deficit present.      Mental Status: He is alert and oriented to person, place, and time.      Comments: NIH-0   Psychiatric:         Mood and Affect: Mood normal.         Behavior: Behavior normal.         Thought Content: Thought content normal.         Judgment: Judgment normal.            Last Recorded Vitals  Blood pressure 116/59, pulse 67, temperature 36.3 °C (97.3 °F), temperature source Temporal, resp. rate 18, height 1.778 m (5' 10\"), weight 86.2 kg (190 lb 0.6 oz), SpO2 96%.     Relevant Results        Results for orders placed or performed during the hospital encounter of 06/04/25 (from the past 24 hours)   CBC and Auto Differential   Result Value Ref Range     WBC 9.1 4.4 - 11.3 x10*3/uL     nRBC 0.0 0.0 - 0.0 /100 WBCs     RBC 4.72 4.50 - 5.90 x10*6/uL     Hemoglobin 14.5 13.5 - 17.5 g/dL     Hematocrit 44.0 41.0 - 52.0 %     MCV 93 80 - 100 fL     MCH 30.7 26.0 - 34.0 pg     MCHC 33.0 32.0 - 36.0 g/dL     RDW 12.5 11.5 - 14.5 %     Platelets 323 150 - 450 x10*3/uL     Neutrophils % 72.6 40.0 - 80.0 %     Immature Granulocytes %, Automated 0.3 0.0 - 0.9 %     Lymphocytes % 15.1 13.0 - 44.0 %     Monocytes % 9.1 2.0 - 10.0 %     Eosinophils % 1.9 0.0 - 6.0 %     Basophils % 1.0 0.0 - 2.0 %     Neutrophils Absolute 6.57 (H) 1.60 - 5.50 x10*3/uL     Immature Granulocytes Absolute, Automated 0.03 0.00 - 0.50 x10*3/uL     Lymphocytes Absolute 1.37 0.80 - 3.00 x10*3/uL     Monocytes Absolute 0.82 (H) 0.05 - 0.80 x10*3/uL     Eosinophils Absolute 0.17 0.00 - 0.40 x10*3/uL     Basophils Absolute 0.09 0.00 - 0.10 x10*3/uL   Magnesium   Result Value Ref Range     Magnesium 1.74 1.60 - 2.40 mg/dL   Comprehensive metabolic panel   Result Value Ref Range     Glucose 290 (H) 74 - 99 mg/dL     Sodium 128 (L) 136 - 145 mmol/L     Potassium 5.4 (H) 3.5 - 5.3 mmol/L     Chloride 92 " (L) 98 - 107 mmol/L     Bicarbonate 22 21 - 32 mmol/L     Anion Gap 19 10 - 20 mmol/L     Urea Nitrogen 29 (H) 6 - 23 mg/dL     Creatinine 1.72 (H) 0.50 - 1.30 mg/dL     eGFR 41 (L) >60 mL/min/1.73m*2     Calcium 10.1 8.6 - 10.3 mg/dL     Albumin 4.4 3.4 - 5.0 g/dL     Alkaline Phosphatase 92 33 - 136 U/L     Total Protein 7.4 6.4 - 8.2 g/dL     AST 15 9 - 39 U/L     Bilirubin, Total 0.6 0.0 - 1.2 mg/dL     ALT 16 10 - 52 U/L   BLOOD GAS VENOUS FULL PANEL   Result Value Ref Range     POCT pH, Venous 7.46 (H) 7.33 - 7.43 pH     POCT pCO2, Venous 32 (L) 41 - 51 mm Hg     POCT pO2, Venous 50 (H) 35 - 45 mm Hg     POCT SO2, Venous 85 (H) 45 - 75 %     POCT Oxy Hemoglobin, Venous 82.7 (H) 45.0 - 75.0 %     POCT Hematocrit Calculated, Venous 45.0 41.0 - 52.0 %     POCT Sodium, Venous 125 (L) 136 - 145 mmol/L     POCT Potassium, Venous 5.4 (H) 3.5 - 5.3 mmol/L     POCT Chloride, Venous 92 (L) 98 - 107 mmol/L     POCT Ionized Calicum, Venous 1.14 1.10 - 1.33 mmol/L     POCT Glucose, Venous 319 (H) 74 - 99 mg/dL     POCT Lactate, Venous 4.6 (HH) 0.4 - 2.0 mmol/L     POCT Base Excess, Venous -0.2 -2.0 - 3.0 mmol/L     POCT HCO3 Calculated, Venous 22.8 22.0 - 26.0 mmol/L     POCT Hemoglobin, Venous 15.1 13.5 - 17.5 g/dL     POCT Anion Gap, Venous 16.0 10.0 - 25.0 mmol/L     Patient Temperature 37.0 degrees Celsius     FiO2 21 %     Critical Called By ELIGIO HDZ, RRT       Critical Called To Dr. GOYAL       Critical Call Time 0065       Critical Read Back Y     Beta Hydroxybutyrate   Result Value Ref Range     Beta-Hydroxybutyrate 0.23 0.02 - 0.27 mmol/L   Troponin I, High Sensitivity   Result Value Ref Range     Troponin I, High Sensitivity 16 0 - 20 ng/L   ECG 12 lead   Result Value Ref Range     Ventricular Rate 87 BPM     Atrial Rate 87 BPM     MI Interval 314 ms     QRS Duration 132 ms     QT Interval 356 ms     QTC Calculation(Bazett) 428 ms     P Axis -6 degrees     R Axis -72 degrees     T Axis -6 degrees      QRS Count 15 beats     Q Onset 211 ms     P Onset 54 ms     P Offset 127 ms     T Offset 389 ms     QTC Fredericia 402 ms   POCT GLUCOSE   Result Value Ref Range     POCT Glucose 183 (H) 74 - 99 mg/dL   Urinalysis with Reflex Culture and Microscopic   Result Value Ref Range     Color, Urine Light-Yellow Light-Yellow, Yellow, Dark-Yellow     Appearance, Urine Clear Clear     Specific Gravity, Urine 1.007 1.005 - 1.035     pH, Urine 6.5 5.0, 5.5, 6.0, 6.5, 7.0, 7.5, 8.0     Protein, Urine NEGATIVE NEGATIVE, 10 (TRACE), 20 (TRACE) mg/dL     Glucose, Urine 300 (3+) (A) Normal mg/dL     Blood, Urine NEGATIVE NEGATIVE mg/dL     Ketones, Urine NEGATIVE NEGATIVE mg/dL     Bilirubin, Urine NEGATIVE NEGATIVE mg/dL     Urobilinogen, Urine Normal Normal mg/dL     Nitrite, Urine NEGATIVE NEGATIVE     Leukocyte Esterase, Urine NEGATIVE NEGATIVE   Extra Urine Gray Tube   Result Value Ref Range     Extra Tube 293     Osmolality, urine   Result Value Ref Range     Osmolality, Urine Random 284 200 - 1,200 mOsm/kg   Sodium, Urine Random   Result Value Ref Range     Sodium, Urine Random 44 mmol/L     Creatinine, Urine Random 46.7 20.0 - 370.0 mg/dL     Sodium/Creatinine Ratio 94 Not established. mmol/g Creat   POCT GLUCOSE   Result Value Ref Range     POCT Glucose 197 (H) 74 - 99 mg/dL   B-Type Natriuretic Peptide   Result Value Ref Range     BNP 45 0 - 99 pg/mL   TSH with reflex to Free T4 if abnormal   Result Value Ref Range     Thyroid Stimulating Hormone 1.73 0.44 - 3.98 mIU/L   Osmolality   Result Value Ref Range     Osmolality, Serum 292 280 - 300 mOsm/kg   Lipid Panel   Result Value Ref Range     Cholesterol 193 0 - 199 mg/dL     HDL-Cholesterol 31.6 mg/dL     Cholesterol/HDL Ratio 6.1       LDL Calculated 121 (H) <=99 mg/dL     VLDL 40 0 - 40 mg/dL     Triglycerides 202 (H) 0 - 149 mg/dL     Non HDL Cholesterol 161 (H) 0 - 149 mg/dL   CBC   Result Value Ref Range     WBC 5.1 4.4 - 11.3 x10*3/uL     nRBC 0.0 0.0 - 0.0 /100 WBCs      RBC 4.22 (L) 4.50 - 5.90 x10*6/uL     Hemoglobin 12.8 (L) 13.5 - 17.5 g/dL     Hematocrit 39.9 (L) 41.0 - 52.0 %     MCV 95 80 - 100 fL     MCH 30.3 26.0 - 34.0 pg     MCHC 32.1 32.0 - 36.0 g/dL     RDW 12.6 11.5 - 14.5 %     Platelets 254 150 - 450 x10*3/uL   Basic metabolic panel   Result Value Ref Range     Glucose 200 (H) 74 - 99 mg/dL     Sodium 133 (L) 136 - 145 mmol/L     Potassium 4.5 3.5 - 5.3 mmol/L     Chloride 97 (L) 98 - 107 mmol/L     Bicarbonate 29 21 - 32 mmol/L     Anion Gap 12 10 - 20 mmol/L     Urea Nitrogen 26 (H) 6 - 23 mg/dL     Creatinine 1.21 0.50 - 1.30 mg/dL     eGFR 63 >60 mL/min/1.73m*2     Calcium 9.5 8.6 - 10.3 mg/dL   Coagulation Screen   Result Value Ref Range     Protime 11.1 9.8 - 12.4 seconds     INR 1.0 0.9 - 1.1     aPTT 30 26 - 36 seconds   Transthoracic Echo Complete   Result Value Ref Range     BSA 2.06 m2      CT head wo IV contrast  Result Date: 6/4/2025  Interpreted By:  Scout Castillo, STUDY: CT HEAD WO IV CONTRAST; CT CERVICAL SPINE WO IV CONTRAST;  6/4/2025 7:10 pm   INDICATION: Signs/Symptoms:ams     COMPARISON: CT head 12/15/2021.   ACCESSION NUMBER(S): [id number]; [id number]   ORDERING CLINICIAN: JOSE GOYAL   TECHNIQUE: Axial noncontrast CT images of head with coronal and sagittal reconstructed images. Axial noncontrast CT images of the cervical spine with coronal and sagittal reconstructed images.   FINDINGS: CT HEAD:   BRAIN PARENCHYMA: Gray-white differentiation is preserved. No mass-effect, midline shift or effacement of cerebral sulci. Scattered periventricular and subcortical white matter hypodensities, nonspecific but often seen in the setting of chronic microangiopathic change.   HEMORRHAGE: No acute intracranial hemorrhage.   VENTRICLES and EXTRA-AXIAL SPACES: The ventricles and sulci are within normal limits for brain volume. No abnormal extra-axial fluid collection.   ORBITS: The visualized orbits and globes are within normal limits.    EXTRACRANIAL SOFT TISSUES: Within normal limits.   PARANASAL SINUSES/MASTOIDS: The visualized paranasal sinuses and mastoid air cells are well aerated.   CALVARIUM: No depressed skull fracture.     CT CERVICAL SPINE:   CRANIOCERVICAL JUNCTION: Intact.   ALIGNMENT: No traumatic malalignment or traumatic facet widening.   VERTEBRAE/DISC SPACES: No acute fracture. Vertebral body heights are maintained. Moderate-to-severe multilevel disc space height loss and associated degenerative endplate changes, greatest at C5-T1. No high grade spinal canal stenosis evident by CT.   SOFT TISSUES: Within normal limits.   OTHER: The visualized lung apices are clear.        CT HEAD: 1. No acute intracranial abnormality or calvarial fracture.     CT CERVICAL SPINE: 1. No acute fracture or traumatic malalignment of the cervical spine.   MACRO: None   Signed by: Scout Castillo 6/4/2025 7:23 PM Dictation workstation:   [id number]     CT cervical spine wo IV contrast  Result Date: 6/4/2025  Interpreted By:  Scout Castillo, STUDY: CT HEAD WO IV CONTRAST; CT CERVICAL SPINE WO IV CONTRAST;  6/4/2025 7:10 pm   INDICATION: Signs/Symptoms:ams     COMPARISON: CT head 12/15/2021.   ACCESSION NUMBER(S): [id number]; [id number]   ORDERING CLINICIAN: JOSE GOYAL   TECHNIQUE: Axial noncontrast CT images of head with coronal and sagittal reconstructed images. Axial noncontrast CT images of the cervical spine with coronal and sagittal reconstructed images.   FINDINGS: CT HEAD:   BRAIN PARENCHYMA: Gray-white differentiation is preserved. No mass-effect, midline shift or effacement of cerebral sulci. Scattered periventricular and subcortical white matter hypodensities, nonspecific but often seen in the setting of chronic microangiopathic change.   HEMORRHAGE: No acute intracranial hemorrhage.   VENTRICLES and EXTRA-AXIAL SPACES: The ventricles and sulci are within normal limits for brain volume. No abnormal extra-axial fluid collection.    ORBITS: The visualized orbits and globes are within normal limits.   EXTRACRANIAL SOFT TISSUES: Within normal limits.   PARANASAL SINUSES/MASTOIDS: The visualized paranasal sinuses and mastoid air cells are well aerated.   CALVARIUM: No depressed skull fracture.     CT CERVICAL SPINE:   CRANIOCERVICAL JUNCTION: Intact.   ALIGNMENT: No traumatic malalignment or traumatic facet widening.   VERTEBRAE/DISC SPACES: No acute fracture. Vertebral body heights are maintained. Moderate-to-severe multilevel disc space height loss and associated degenerative endplate changes, greatest at C5-T1. No high grade spinal canal stenosis evident by CT.   SOFT TISSUES: Within normal limits.   OTHER: The visualized lung apices are clear.        CT HEAD: 1. No acute intracranial abnormality or calvarial fracture.     CT CERVICAL SPINE: 1. No acute fracture or traumatic malalignment of the cervical spine.   MACRO: None   Signed by: Scout Castillo 6/4/2025 7:23 PM Dictation workstation:   [id number]     XR chest 1 view  Result Date: 6/4/2025  STUDY: Chest Radiograph;  6/4/2025 6:34 PM INDICATION: Altered mental status. COMPARISON: None Available. ACCESSION NUMBER(S): [id number] ORDERING CLINICIAN: JOSE GOYAL TECHNIQUE:  Frontal chest was obtained at 18:34 hours. FINDINGS: CARDIOMEDIASTINAL SILHOUETTE: Cardiomediastinal silhouette is normal in size and configuration.  LUNGS: Lungs are clear.  ABDOMEN: No remarkable upper abdominal findings.  BONES: No acute osseous changes.     No acute process. Signed by Heri Bowling MD     Assessment & Plan  LUDA (acute kidney injury)     Aphasia     Altered mental status     Hyponatremia     Hyperkalemia     Dehydration     Hyperglycemia     74 year old male who presented to ED with aphasia and confusion. Concern for TIA/Stroke. Some lab abnormalities noted. Replete electrolytes. Continue IV fluids. Neurology consulted. MRI brain/MRA head and neck ordered. PT/OT/SLP/SW ordered. Patient to be  admitted to hospital for further medical management.      Altered mental status, resolved  Aphasia, resolved  LUDA  Hyponatremia  Hyperkalemia  Hyperglycemia  Dehydration  Hx of stroke  -Admit to OBS/telemetry to Dr. Burnham   -Neurology consult and appreciate recs  -See imaging results   -MRI brain/MRA head and neck ordered (concern for stroke/TIA)  -Echocardiogram ordered  -2L LR given in ED. Continue LR@ 75ml/hr x1L  -NIH-0  -Continue aspirin, Plavix, statin  -Replete electrolytes  -Strict I&O  -UA ordered   -Hold Lasix   -PT/OT/SLP/SW ordered  -Serum and urine osmolality, sodium urine spot ordered  -Repeat labs in AM (lipid panel, TSH, hemoglobin a1c)     Chronic conditions  #DMII, HLD, b/l carotid artery stenosis  -SSI with hypoglycemic protocol  -Full code      DVT prophylaxis   -Heparin SQ  -SCD's as tolerated      I spent 65 minutes in the professional and overall care of this patient.  Patient fully evaluated on June 4.  Correct electrolytes and monitor overnight.  Plan as above.  Wilfredo Burnham MD                 Revision History    Patient fully evaluated 06/06  for   Assessment & Plan  LUDA (acute kidney injury)    Aphasia    Altered mental status    Hyponatremia    Hyperkalemia    Dehydration    Hyperglycemia      Patient with significant clinical improvement noted, patient medically cleared for discharge today. Patient seen resting in bed with head of bed elevated, no s/s or c/o acute difficulties at this time. Vital signs for last 24 hours:  Temp:  [36.3 °C (97.3 °F)-36.9 °C (98.4 °F)] 36.5 °C (97.7 °F)  Heart Rate:  [66-87] 87  Resp:  [16-18] 18  BP: (122-146)/(59-83) 134/75 Medications and labs reviewed-   Results for orders placed or performed during the hospital encounter of 06/04/25 (from the past 24 hours)   POCT GLUCOSE   Result Value Ref Range    POCT Glucose 184 (H) 74 - 99 mg/dL   Urine electrolytes   Result Value Ref Range    Sodium, Urine Random 50 mmol/L    Sodium/Creatinine Ratio 150 Not  established. mmol/g Creat    Potassium, Urine Random 24 mmol/L    Potassium/Creatinine Ratio 72 Not established mmol/g Creat    Chloride, Urine Random 34 mmol/L    Chloride/Creatinine Ratio 102 23 - 275 mmol/g creat    Creatinine, Urine Random 33.4 20.0 - 370.0 mg/dL   Urea Nitrogen, Urine Random   Result Value Ref Range    Urea Nitrogen, Urine Random 379 mg/dL    Creatinine, Urine Random 33.4 20.0 - 370.0 mg/dL    Urea Nitrogen/Creatinine Ratio 11.3 Not established. g/g creat   Albumin-Creatinine Ratio, Urine Random   Result Value Ref Range    Albumin, Urine Random <7.0 Not established mg/L    Creatinine, Urine Random 33.4 20.0 - 370.0 mg/dL    Albumin/Creatinine Ratio     POCT GLUCOSE   Result Value Ref Range    POCT Glucose 160 (H) 74 - 99 mg/dL   POCT GLUCOSE   Result Value Ref Range    POCT Glucose 224 (H) 74 - 99 mg/dL   POCT GLUCOSE   Result Value Ref Range    POCT Glucose 182 (H) 74 - 99 mg/dL   Uric Acid   Result Value Ref Range    Uric Acid 6.6 4.0 - 7.5 mg/dL   CBC   Result Value Ref Range    WBC 4.2 (L) 4.4 - 11.3 x10*3/uL    nRBC 0.0 0.0 - 0.0 /100 WBCs    RBC 4.29 (L) 4.50 - 5.90 x10*6/uL    Hemoglobin 13.2 (L) 13.5 - 17.5 g/dL    Hematocrit 41.4 41.0 - 52.0 %    MCV 97 80 - 100 fL    MCH 30.8 26.0 - 34.0 pg    MCHC 31.9 (L) 32.0 - 36.0 g/dL    RDW 12.6 11.5 - 14.5 %    Platelets 244 150 - 450 x10*3/uL   Comprehensive Metabolic Panel   Result Value Ref Range    Glucose 179 (H) 74 - 99 mg/dL    Sodium 135 (L) 136 - 145 mmol/L    Potassium 4.1 3.5 - 5.3 mmol/L    Chloride 100 98 - 107 mmol/L    Bicarbonate 26 21 - 32 mmol/L    Anion Gap 13 10 - 20 mmol/L    Urea Nitrogen 21 6 - 23 mg/dL    Creatinine 0.99 0.50 - 1.30 mg/dL    eGFR 80 >60 mL/min/1.73m*2    Calcium 9.3 8.6 - 10.3 mg/dL    Albumin 3.9 3.4 - 5.0 g/dL    Alkaline Phosphatase 77 33 - 136 U/L    Total Protein 6.7 6.4 - 8.2 g/dL    AST 14 9 - 39 U/L    Bilirubin, Total 0.5 0.0 - 1.2 mg/dL    ALT 12 10 - 52 U/L   Phosphorus   Result Value Ref  Range    Phosphorus 3.1 2.5 - 4.9 mg/dL      Patient recently received an antibiotic (last 12 hours)       None           No results found for the last 90 days.       Continue aggressive pulmonary hygiene and oral hygiene. Off loading as tolerated for skin integrity. No new complaints per patient, stable at time of exam.  Plan discussed with interdisciplinary team, no acute events overnight, patient denies chest pain, worsening SOB at this time. Ok to discharge, will continue current and repeat labs in the AM if patient still hospitalized. Patient aware and agreeable to current plan, continue plan as above.     I spent 60 minutes on the date of the service which included preparing to see the patient, face-to-face patient care, completing clinical documentation, obtaining and/or reviewing separately obtained history, performing a medically appropriate examination, counseling and educating the patient/family/caregiver, ordering medications, tests, or procedures, communicating with other HCPs (not separately reported), independently interpreting results (not separately reported), communicating results to the patient/family/caregiver, and care coordination (not separately reported   Pertinent Physical Exam At Time of Discharge  Physical Exam    Outpatient Follow-Up  No future appointments.      Chanell Liriano

## 2025-06-06 NOTE — CONSULTS
Reason For Consult  LUDA/electrolyte imbalance    History Of Present Illness  Modesto Loernz is a 74 y.o. male who has a past history of type 2 diabetes, history of CVA in the past, hyperlipidemia carotid stenosis status post carotid stent history hypertension on multiple medications presented with change in mental status and mild confusion after a long day of working regarding.  Patient's wife noticed a change in patient's speech and mild confusion for which she the EMS.     On initial presentation to the emergency room on    [facility] patient was noted to have elevation of his renal chemistries from baseline with a creatinine of to 1.72.  Hyperkalemia 5.4 and acute hyponatremia of 128.  Patient blood glucose was 290 with corrected  sodium of 133.      Patient have ultrasound of the kidney and urine electrolytes and workup for chronic kidney disease started.  Patient fractional excretion of sodium was 1.38%  Patient fractional excretion of urea was elevated at 52.45%.  Patient changes ultrasound is close #1 hepatic steatosis #2 increased kidney density and echogenicity a simple cyst in the left kidney bladder thickening and prominent prostate with protrusion of the bladder base dimensions were 4.8 x 3.3 x 5.2 cm.       Does not recall if he had a history of prostatic issues in the past last PSA was on August 7, 2023 and at that time the PSA was normal at 0.38      Patient denies nocturia or dysuria hematuria hesitancy or abnormal taste.  Patient blood pressure medications were adjusted with improvement of his renal chemistries.  Patient creatinine today is 0.99.  After fluid resuscitation     Past Medical History  Carotid stenosis  Hyperlipidemia  Hypertension  Type 2 diabetes      Surgical History  He has a past surgical history that includes Other surgical history (12/23/2021); CT angio neck (12/14/2021); CT angio head w and wo IV contrast (12/14/2021); MR angio head wo IV contrast (12/13/2021); and MR angio neck wo  "IV contrast (12/13/2021).     Social History  He reports that he quit smoking about 4 years ago. His smoking use included cigarettes. He has quit using smokeless tobacco. No history on file for alcohol use and drug use.    Family History  Family History[1]  Noncontributory  Allergies  Patient has no known allergies.    Review of Systems  HEENT; denies diplopia  Cardiac; denies palpitations shortness of breath  Respiratory; denies hemoptysis or cough  Abdomen; denies reflux abdominal pain  ; denies nocturia hematuria or dysuria  Musculoskeletal; denies weakness or joint pain  Neurological; positive for confusion slurred speech less than 24 hours     Physical Exam  General Appearance; alert oriented  HEENT; pupils react reactive to light and accommodation,  Extraocular movements are intact,  There is no nystagmus or ptosis;  Fossa's are clear tongue is midline  Neck; no jugular vein distention no bruit no thyromegaly no adenopathy; full range of motion  Lungs; clear to auscultation and percussion  Heart; regular rate no gallop no rubs or thrills no murmurs  Abdomen; active peristalsis no rebound no guarding no organomegaly no shifting dullness  Extremities; trace edema  Neurological; no tremors no asterixis no clonus  Patient is oriented and alert     Last Recorded Vitals  Blood pressure 134/75, pulse 87, temperature 36.5 °C (97.7 °F), resp. rate 18, height 1.778 m (5' 10\"), weight 86.2 kg (190 lb 0.6 oz), SpO2 94%.    Relevant Results  Results for orders placed or performed during the hospital encounter of 06/04/25 (from the past 24 hours)   Urine electrolytes   Result Value Ref Range    Sodium, Urine Random 50 mmol/L    Sodium/Creatinine Ratio 150 Not established. mmol/g Creat    Potassium, Urine Random 24 mmol/L    Potassium/Creatinine Ratio 72 Not established mmol/g Creat    Chloride, Urine Random 34 mmol/L    Chloride/Creatinine Ratio 102 23 - 275 mmol/g creat    Creatinine, Urine Random 33.4 20.0 - 370.0 mg/dL "   Urea Nitrogen, Urine Random   Result Value Ref Range    Urea Nitrogen, Urine Random 379 mg/dL    Creatinine, Urine Random 33.4 20.0 - 370.0 mg/dL    Urea Nitrogen/Creatinine Ratio 11.3 Not established. g/g creat   Albumin-Creatinine Ratio, Urine Random   Result Value Ref Range    Albumin, Urine Random <7.0 Not established mg/L    Creatinine, Urine Random 33.4 20.0 - 370.0 mg/dL    Albumin/Creatinine Ratio     POCT GLUCOSE   Result Value Ref Range    POCT Glucose 160 (H) 74 - 99 mg/dL   POCT GLUCOSE   Result Value Ref Range    POCT Glucose 224 (H) 74 - 99 mg/dL   POCT GLUCOSE   Result Value Ref Range    POCT Glucose 182 (H) 74 - 99 mg/dL   Uric Acid   Result Value Ref Range    Uric Acid 6.6 4.0 - 7.5 mg/dL   Myoglobin   Result Value Ref Range    Myoglobin 88 <=92 ug/L   CBC   Result Value Ref Range    WBC 4.2 (L) 4.4 - 11.3 x10*3/uL    nRBC 0.0 0.0 - 0.0 /100 WBCs    RBC 4.29 (L) 4.50 - 5.90 x10*6/uL    Hemoglobin 13.2 (L) 13.5 - 17.5 g/dL    Hematocrit 41.4 41.0 - 52.0 %    MCV 97 80 - 100 fL    MCH 30.8 26.0 - 34.0 pg    MCHC 31.9 (L) 32.0 - 36.0 g/dL    RDW 12.6 11.5 - 14.5 %    Platelets 244 150 - 450 x10*3/uL   Comprehensive Metabolic Panel   Result Value Ref Range    Glucose 179 (H) 74 - 99 mg/dL    Sodium 135 (L) 136 - 145 mmol/L    Potassium 4.1 3.5 - 5.3 mmol/L    Chloride 100 98 - 107 mmol/L    Bicarbonate 26 21 - 32 mmol/L    Anion Gap 13 10 - 20 mmol/L    Urea Nitrogen 21 6 - 23 mg/dL    Creatinine 0.99 0.50 - 1.30 mg/dL    eGFR 80 >60 mL/min/1.73m*2    Calcium 9.3 8.6 - 10.3 mg/dL    Albumin 3.9 3.4 - 5.0 g/dL    Alkaline Phosphatase 77 33 - 136 U/L    Total Protein 6.7 6.4 - 8.2 g/dL    AST 14 9 - 39 U/L    Bilirubin, Total 0.5 0.0 - 1.2 mg/dL    ALT 12 10 - 52 U/L   Phosphorus   Result Value Ref Range    Phosphorus 3.1 2.5 - 4.9 mg/dL     US renal complete  Result Date: 6/5/2025  Interpreted By:  Zak Phillips, STUDY: US RENAL COMPLETE;  6/5/2025 3:13 pm   INDICATION: Signs/Symptoms:LUDA.      COMPARISON: None.   ACCESSION NUMBER(S): [id number]   ORDERING CLINICIAN: BRANDO NEVAREZ   TECHNIQUE: Multiple images of the kidneys were obtained  .   FINDINGS: RIGHT KIDNEY: The right kidney measures 9.2 cm in length. Mild increased renal cortical echogenicity. No definite renal stones or hydronephrosis. Cortical thickness up to at least 1.2 cm. Possible fatty liver.   LEFT KIDNEY: The left kidney measures 10.1 cm in length. Mild increased cortical echogenicity. Cortical thickness up to at least 1.2 cm. No definite renal stones or hydronephrosis. Exophytic hypoechoic lesion mid zone laterally measuring up to 1.3 cm likely representing a cyst.   BLADDER: Mild nonspecific urinary bladder wall thickening. Prominent lobulated heterogeneous appearance of the prostate gland with protrusion at the bladder base and measuring up to approximately 4.8 x 3.3 x 5.2 cm with volume of 42.6 mL. Focal nodular protrusion at the posterior bladder at the region of the prostate gland measuring up to 1.5 cm, probably focal protrusion from the enlarged prostate gland although other etiologies including bladder lesion not excluded.       No definite renal stones or hydronephrosis.   Mild increased renal cortical echogenicity suggesting medical renal disease.   Exophytic hypoechoic left renal lesion as described likely representing a cyst.   Mild nonspecific urinary bladder wall thickening.   Enlarged lobulated heterogeneous prostate gland with protrusion at the bladder base and underlying malignancy not excluded. Correlate with serum PSA.Focal nodular protrusion at the posterior bladder base at the level of the prostate gland described, probably focal protrusion from the enlarged prostate gland with underlying neoplastic process involving the prostate gland or urinary bladder lesion/neoplasm not excluded. Consider correlation with cystoscopy for further assessment.   MACRO: None   Signed by: Zak Phillips 6/5/2025 3:29 PM Dictation  workstation:   [id number]    Transthoracic Echo Complete  Result Date: 6/5/2025   [facility], [address] [address]           Tel [phone] and Fax [phone] TRANSTHORACIC ECHOCARDIOGRAM REPORT  Patient Name:       TANIA EDMOND         Gi Physician:    [id number] Chas Schmidt MD Study Date:         6/5/2025            Ordering Provider:    [id number] GAIL IVORY MRN/PID:            [id number]            Fellow: Accession#:         [id number]        Nurse:                Felecia Colón RN Date of Birth/Age:  1950 / 74      Sonographer:          Sd covarrubias RDCS Gender assigned at                     Additional Staff: Birth: Height:             177.00 cm           Admit Date:           6/4/2025 Weight:             86.00 kg            Admission Status:     Inpatient -                                                               Routine BSA / BMI:          2.03 m2 / 27.45     Encounter#:           [id number]                     kg/m2 Blood Pressure:     101/55 mmHg         Department Location:  [place] 1st floor                                                               [facility] Study Type:    TRANSTHORACIC ECHO (TTE) COMPLETE Diagnosis/ICD: Aphasia-R47.01 Indication:    Aphasia; CPT Code:      Echo Complete w Full [id number] Patient History: Pertinent History: PMH DMII, stroke, HLD, b/l carotid artery stenosis s/p                    carotid shunt, Aphasia. Study Detail: The following Echo studies were performed: 2D, M-Mode, Doppler,               color flow and 3D. Technically challenging study due to body               habitus. Agitated saline used as a contrast agent for intraseptal               flow evaluation.   PHYSICIAN INTERPRETATION: Left Ventricle: The left ventricular systolic function is normal with a visually estimated ejection fraction of 55-60%. There are no regional left ventricular wall motion abnormalities. The left ventricular cavity size is normal. There is mildly increased septal and mildly increased posterior left ventricular wall thickness. There is left ventricular concentric remodeling. Spectral Doppler shows a Grade I (impaired relaxation pattern) of left ventricular diastolic filling with normal left atrial filling pressure. Left Atrium: The left atrial size is normal. Right Ventricle: The right ventricle is normal in size. There is normal right ventricular global systolic function. Right Atrium: The right atrium is normal in size. Aortic Valve: The aortic valve is trileaflet. The aortic valve area by VTI is 2.84 cmï¿½ with a peak velocity of 0.92 m/s. The peak and mean gradients are 3 mmHg and 2 mmHg, respectively, with a dimensionless index of 0.75. There is no evidence of aortic valve regurgitation. Mitral Valve: The mitral valve is normal in structure. There is trace mitral valve regurgitation. The E Vmax is 0.68 m/s. Tricuspid Valve: The tricuspid valve is structurally normal. There is trace tricuspid regurgitation. The right ventricular systolic pressure could not be estimated. Pulmonic Valve: The pulmonic valve is structurally normal. There is no indication of pulmonic valve regurgitation. Pericardium: Trivial pericardial effusion. Aorta: The aortic root is normal. Systemic Veins: The inferior vena cava appears normal in size, with IVC inspiratory collapse greater than 50%. In comparison to the previous echocardiogram(s): Compared with study dated 12/13/2021,.  CONCLUSIONS:  1. The left ventricular systolic function is normal with a visually estimated ejection fraction of 55-60%.  2. Spectral Doppler shows a Grade I (impaired relaxation pattern) of left ventricular diastolic filling with normal  left atrial filling pressure.  3. There is normal right ventricular global systolic function. QUANTITATIVE DATA SUMMARY:  2D MEASUREMENTS:          Normal Ranges: LAs:             2.80 cm  (2.7-4.0cm) IVSd:            1.20 cm  (0.6-1.1cm) LVPWd:           1.20 cm  (0.6-1.1cm) LVIDd:           3.10 cm  (3.9-5.9cm) LVIDs:           2.60 cm LV Mass Index:   56 g/m2 LVEDV Index:     40 ml/m2 LV % FS          16.1 %  LEFT ATRIUM:                  Normal Ranges: LA Vol A4C:        31.2 ml    (22+/-6mL/m2) LA Vol A2C:        27.1 ml LA Vol BP:         30.2 ml LA Vol Index A4C:  15.4ml/m2 LA Vol Index A2C:  13.3 ml/m2 LA Vol Index BP:   14.9 ml/m2 LA Area A4C:       12.5 cm2 LA Area A2C:       12.1 cm2 LA Major Axis A4C: 4.2 cm LA Major Axis A2C: 4.6 cm LA Volume Index:   14.9 ml/m2  RIGHT ATRIUM:                 Normal Ranges: RA Vol A4C:        34.3 ml    (8.3-19.5ml) RA Vol Index A4C:  16.8 ml/m2 RA Area A4C:       14.6 cm2 RA Major Axis A4C: 5.3 cm  M-MODE MEASUREMENTS:         Normal Ranges: Ao Root:             3.50 cm (2.0-3.7cm) LAs:                 3.70 cm (2.7-4.0cm)  AORTA MEASUREMENTS:         Normal Ranges: Ao Sinus, d:        3.70 cm (2.1-3.5cm) Ao STJ, d:          3.10 cm (1.7-3.4cm) Asc Ao, d:          3.60 cm (2.1-3.4cm)  LV SYSTOLIC FUNCTION:                      Normal Ranges: EF-A4C View:    53 % (>=55%) EF-A2C View:    62 % EF-Biplane:     57 % EF-Visual:      58 % LV EF Reported: 58 %  LV DIASTOLIC FUNCTION:             Normal Ranges: MV Peak E:             0.68 m/s    (0.7-1.2 m/s) MV Peak A:             1.01 m/s    (0.42-0.7 m/s) E/A Ratio:             0.67        (1.0-2.2) MV e'                  0.084 m/s   (>8.0) MV lateral e'          0.09 m/s MV medial e'           0.08 m/s MV A Dur:              206.00 msec E/e' Ratio:            8.03        (<8.0) PulmV Sys Jhony:         30.20 cm/s PulmV Schmidt Jhony:        23.20 cm/s PulmV S/D Jhony:         1.30 PulmV A Revs Jhony:      17.60 cm/s PulmV A Revs Dur:       151.00 msec  MITRAL VALVE:          Normal Ranges: MV DT:        200 msec (150-240msec)  AORTIC VALVE:                     Normal Ranges: AoV Vmax:                0.92 m/s (<=1.7m/s) AoV Peak PG:             3.4 mmHg (<20mmHg) AoV Mean P.0 mmHg (1.7-11.5mmHg) LVOT Max Jhony:            0.84 m/s (<=1.1m/s) AoV VTI:                 18.20 cm (18-25cm) LVOT VTI:                13.60 cm LVOT Diameter:           2.20 cm  (1.8-2.4cm) AoV Area, VTI:           2.84 cm2 (2.5-5.5cm2) AoV Area,Vmax:           3.45 cm2 (2.5-4.5cm2) AoV Dimensionless Index: 0.75  RIGHT VENTRICLE: RV Basal 3.40 cm RV Mid   2.80 cm RV Major 7.8 cm TAPSE:   18.3 mm RV s'    0.13 m/s  TRICUSPID VALVE/RVSP:         Normal Ranges: Est. RA Pressure:     3 IVC Diam:             1.85 cm  PULMONIC VALVE:          Normal Ranges: PV Max Jhony:     1.0 m/s  (0.6-0.9m/s) PV Max PG:      3.8 mmHg  PULMONARY VEINS: PulmV A Revs Dur: 151.00 msec PulmV A Revs Jhony: 17.60 cm/s PulmV Schmidt Jhony:   23.20 cm/s PulmV S/D Jhony:    1.30 PulmV Sys Jhony:    30.20 cm/s  [id number] Chas Schmidt MD Electronically signed on 2025 at 1:08:27 PM  ** Final **     MR angio head wo IV contrast  Result Date: 2025  Interpreted By:  Reggie Nuñez, STUDY: MR ANGIO NECK WO IV CONTRAST; MR ANGIO HEAD WO IV CONTRAST;  2025 8:55 am   INDICATION: Signs/Symptoms:Aphasia/AMS.     COMPARISON: MRA dated 2021   ACCESSION NUMBER(S): [id number]; [id number]   ORDERING CLINICIAN: GAIL IVORY   TECHNIQUE: Time-of-flight MRA images of the neck and intracranial vessels were obtained.   FINDINGS: The MRA of the neck demonstrates no significant stenosis along the bilateral carotid bifurcations or visualized cervical segment of the right dominant vertebral artery. There is again evidence of a small caliber left vertebral artery with segmental areas of diminished MRA signal/MRA signal dropout along expected more distal course of the cervical left vertebral artery  suspicious for segmental occlusion, marked narrowing, and/or markedly diminished flow.   The intracranial MRA demonstrates an asymmetrically small caliber M1 segment of the left middle cerebral artery with asymmetric diminished visualization of M2 and more distal branch vessels of the left middle cerebral artery when compared with the right more pronounced when compared with the prior intracranial MRA dated 12/13/2021. There is again evidence of a segmental area of diminished MRA signal/MRA signal dropout along the proximal A1 segment of the left anterior cerebral artery suspicious for segmental narrowing and/or occlusion. There are additional segmental areas of diminished MRA signal noted along the proximal A1 segment of the right anterior cerebral artery as well as A1 and A2 segments of the right posterior cerebral artery suspicious for segmental areas of narrowing in these regions as well. There is a segmental area of diminished MRA signal raising the possibility of segmental narrowing and/or artifact along a more distal branch vessel of the left posterior cerebral artery. There is lack of MRA signal expected location of the distal left vertebral artery caudal to the origin of the left posterior inferior cerebellar artery.       The MRA of the neck demonstrates no significant stenosis along the bilateral carotid bifurcations or visualized cervical segment of the right dominant vertebral artery. There is again evidence of a small caliber left vertebral artery with segmental areas of diminished MRA signal/MRA signal dropout along expected more distal course of the cervical left vertebral artery suspicious for segmental occlusion, marked narrowing, and/or markedly diminished flow.   The intracranial MRA demonstrates an asymmetrically small caliber M1 segment of the left middle cerebral artery with asymmetric diminished visualization of M2 and more distal branch vessels of the left middle cerebral artery when  compared with the right more pronounced when compared with the prior intracranial MRA dated 12/13/2021. There is again evidence of a segmental area of diminished MRA signal/MRA signal dropout along the proximal A1 segment of the left anterior cerebral artery suspicious for segmental narrowing and/or occlusion. There are additional segmental areas of diminished MRA signal noted along the proximal A1 segment of the right anterior cerebral artery as well as A1 and A2 segments of the right posterior cerebral artery suspicious for segmental areas of narrowing in these regions as well. There is a segmental area of diminished MRA signal raising the possibility of segmental narrowing and/or artifact along a more distal branch vessel of the left posterior cerebral artery. There is lack of MRA signal expected location of the distal left vertebral artery caudal to the origin of the left posterior inferior cerebellar artery.   MACRO: Critical Finding:  See findings. Notification was initiated on 6/5/2025 at 9:50 am by  Reggie Nuñez.  (**-OCF-**)   Signed by: Reggie Nuñez 6/5/2025 9:50 AM Dictation workstation:   [id number]    MR angio neck wo IV contrast  Result Date: 6/5/2025  Interpreted By:  Reggie Nuñez, STUDY: MR ANGIO NECK WO IV CONTRAST; MR ANGIO HEAD WO IV CONTRAST;  6/5/2025 8:55 am   INDICATION: Signs/Symptoms:Aphasia/AMS.     COMPARISON: MRA dated 12/13/2021   ACCESSION NUMBER(S): [id number]; [id number]   ORDERING CLINICIAN: GAIL IVORY   TECHNIQUE: Time-of-flight MRA images of the neck and intracranial vessels were obtained.   FINDINGS: The MRA of the neck demonstrates no significant stenosis along the bilateral carotid bifurcations or visualized cervical segment of the right dominant vertebral artery. There is again evidence of a small caliber left vertebral artery with segmental areas of diminished MRA signal/MRA signal dropout along expected more distal course of the cervical left vertebral artery  compared with the right more pronounced when compared with the prior intracranial MRA dated 12/13/2021. There is again evidence of a segmental area of diminished MRA signal/MRA signal dropout along the proximal A1 segment of the left anterior cerebral artery suspicious for segmental narrowing and/or occlusion. There are additional segmental areas of diminished MRA signal noted along the proximal A1 segment of the right anterior cerebral artery as well as A1 and A2 segments of the right posterior cerebral artery suspicious for segmental areas of narrowing in these regions as well. There is a segmental area of diminished MRA signal raising the possibility of segmental narrowing and/or artifact along a more distal branch vessel of the left posterior cerebral artery. There is lack of MRA signal expected location of the distal left vertebral artery caudal to the origin of the left posterior inferior cerebellar artery.   MACRO: Critical Finding:  See findings. Notification was initiated on 6/5/2025 at 9:50 am by  Reggie Nuñez.  (**-OCF-**)   Signed by: Reggie Nuñez 6/5/2025 9:50 AM Dictation workstation:   [id number]    MR brain wo IV contrast  Result Date: 6/5/2025  Interpreted By:  Liam Galvez and Omar Mahmoud STUDY: MR BRAIN WO IV CONTRAST;  6/5/2025 8:55 am   INDICATION: Signs/Symptoms:aphasia/AMS.     COMPARISON: CT head 06/04/2025, brain MR 12/15/2021   ACCESSION NUMBER(S): [id number]   ORDERING CLINICIAN: GAIL IVORY   TECHNIQUE: Axial T2, FLAIR, DWI, gradient echo T2 and sagittal and coronal T1 weighted images of brain were acquired.   FINDINGS: CSF Spaces: The ventricles, sulci and basal cisterns are within normal limits.   Parenchyma: There is no diffusion restriction abnormality to suggest acute infarct.  There is no mass effect or midline shift. Gradient echo T2 weighted images fail to reveal the presence of hemorrhage or hemosiderin deposition. There is mild-to-moderate diffuse parenchymal  volume loss with associated widening of the sulci. There are scattered foci of increased signal in the subcortical and periventricular white matter best appreciated on FLAIR images, minimally increased as compared to prior brain MR; although nonspecific, the FLAIR hyperintensities are favored to represent sequela of chronic microvascular ischemic changes.   Paranasal Sinuses and Mastoids: Minimal mucosal thickening of the right maxillary sinus. Otherwise, the visualized paranasal sinuses and mastoid air cells are unremarkable.       1.  No evidence of acute infarct, intracranial mass effect or midline shift. 2. Minimally increased FLAIR hyperintensities which although nonspecific is favored to represent chronic microvascular ischemic changes. 3. Mild-to-moderate diffuse parenchymal volume loss.   I personally reviewed the images/study and I agree with the findings as stated by Asif Allred MD (PGY-2). This study was interpreted at [place], [place].   MACRO: None   Signed by: Liam Galvez 6/5/2025 9:34 AM Dictation workstation:   [id number]    ECG 12 lead  Result Date: 6/5/2025  Sinus rhythm with sinus arrhythmia with 1st degree AV block Left axis deviation Right bundle branch block Abnormal ECG When compared with ECG of 12-DEC-2021 21:57, PREVIOUS ECG IS PRESENT    CT head wo IV contrast  Result Date: 6/4/2025  Interpreted By:  Scout Castillo, STUDY: CT HEAD WO IV CONTRAST; CT CERVICAL SPINE WO IV CONTRAST;  6/4/2025 7:10 pm   INDICATION: Signs/Symptoms:ams     COMPARISON: CT head 12/15/2021.   ACCESSION NUMBER(S): [id number]; [id number]   ORDERING CLINICIAN: JOSE GOYAL   TECHNIQUE: Axial noncontrast CT images of head with coronal and sagittal reconstructed images. Axial noncontrast CT images of the cervical spine with coronal and sagittal reconstructed images.   FINDINGS: CT HEAD:   BRAIN PARENCHYMA: Gray-white differentiation is preserved. No  mass-effect, midline shift or effacement of cerebral sulci. Scattered periventricular and subcortical white matter hypodensities, nonspecific but often seen in the setting of chronic microangiopathic change.   HEMORRHAGE: No acute intracranial hemorrhage.   VENTRICLES and EXTRA-AXIAL SPACES: The ventricles and sulci are within normal limits for brain volume. No abnormal extra-axial fluid collection.   ORBITS: The visualized orbits and globes are within normal limits.   EXTRACRANIAL SOFT TISSUES: Within normal limits.   PARANASAL SINUSES/MASTOIDS: The visualized paranasal sinuses and mastoid air cells are well aerated.   CALVARIUM: No depressed skull fracture.     CT CERVICAL SPINE:   CRANIOCERVICAL JUNCTION: Intact.   ALIGNMENT: No traumatic malalignment or traumatic facet widening.   VERTEBRAE/DISC SPACES: No acute fracture. Vertebral body heights are maintained. Moderate-to-severe multilevel disc space height loss and associated degenerative endplate changes, greatest at C5-T1. No high grade spinal canal stenosis evident by CT.   SOFT TISSUES: Within normal limits.   OTHER: The visualized lung apices are clear.       CT HEAD: 1. No acute intracranial abnormality or calvarial fracture.     CT CERVICAL SPINE: 1. No acute fracture or traumatic malalignment of the cervical spine.   MACRO: None   Signed by: Scout Castillo 6/4/2025 7:23 PM Dictation workstation:   [id number]    CT cervical spine wo IV contrast  Result Date: 6/4/2025  Interpreted By:  Scout Castillo, STUDY: CT HEAD WO IV CONTRAST; CT CERVICAL SPINE WO IV CONTRAST;  6/4/2025 7:10 pm   INDICATION: Signs/Symptoms:ams     COMPARISON: CT head 12/15/2021.   ACCESSION NUMBER(S): [id number]; [id number]   ORDERING CLINICIAN: JOSE GOYAL   TECHNIQUE: Axial noncontrast CT images of head with coronal and sagittal reconstructed images. Axial noncontrast CT images of the cervical spine with coronal and sagittal reconstructed images.   FINDINGS: CT HEAD:    BRAIN PARENCHYMA: Gray-white differentiation is preserved. No mass-effect, midline shift or effacement of cerebral sulci. Scattered periventricular and subcortical white matter hypodensities, nonspecific but often seen in the setting of chronic microangiopathic change.   HEMORRHAGE: No acute intracranial hemorrhage.   VENTRICLES and EXTRA-AXIAL SPACES: The ventricles and sulci are within normal limits for brain volume. No abnormal extra-axial fluid collection.   ORBITS: The visualized orbits and globes are within normal limits.   EXTRACRANIAL SOFT TISSUES: Within normal limits.   PARANASAL SINUSES/MASTOIDS: The visualized paranasal sinuses and mastoid air cells are well aerated.   CALVARIUM: No depressed skull fracture.     CT CERVICAL SPINE:   CRANIOCERVICAL JUNCTION: Intact.   ALIGNMENT: No traumatic malalignment or traumatic facet widening.   VERTEBRAE/DISC SPACES: No acute fracture. Vertebral body heights are maintained. Moderate-to-severe multilevel disc space height loss and associated degenerative endplate changes, greatest at C5-T1. No high grade spinal canal stenosis evident by CT.   SOFT TISSUES: Within normal limits.   OTHER: The visualized lung apices are clear.       CT HEAD: 1. No acute intracranial abnormality or calvarial fracture.     CT CERVICAL SPINE: 1. No acute fracture or traumatic malalignment of the cervical spine.   MACRO: None   Signed by: Scout Castillo 6/4/2025 7:23 PM Dictation workstation:   [id number]    XR chest 1 view  Result Date: 6/4/2025  STUDY: Chest Radiograph;  6/4/2025 6:34 PM INDICATION: Altered mental status. COMPARISON: None Available. ACCESSION NUMBER(S): [id number] ORDERING CLINICIAN: JOSE GOYAL TECHNIQUE:  Frontal chest was obtained at 18:34 hours. FINDINGS: CARDIOMEDIASTINAL SILHOUETTE: Cardiomediastinal silhouette is normal in size and configuration.  LUNGS: Lungs are clear.  ABDOMEN: No remarkable upper abdominal findings.  BONES: No acute osseous  changes.    No acute process. Signed by Heri Bowling MD        Assessment/Plan     Acute kidney injury   Dehydration  Chronic kidney disease  Benign renal cyst  mild hyponatremia  Enlarged prostate  Plan  Start patient on Flomax  Decrease the dose of Apresoline to 25 mg twice a day  Patient was advised to follow-up with urology as an outpatient  PSA is pending  Follow-up with me within a month  Thank you for the consult      I spent  60 minutes in the professional and overall care of this patient.      Jackson Mendez MD         [1] No family history on file.

## 2025-06-06 NOTE — CARE PLAN
The patient's goals for the shift include safety    The clinical goals for the shift include to be safe throughout the shift    Over the shift, the patient did not make progress toward the following goals. Barriers to progression include LUDA. Recommendations to address these barriers include communication.

## 2025-06-06 NOTE — PROGRESS NOTES
Nutrition Progress Note    Pt had already talked to the Diabetes Educator but asked to talk to the dietitian about his diet.   We talked about carbohydrate counting and he seemed very interested in eating healthy.  He had a lot of questions.  Info on carb counting and healthy food choices provided with contact number.   Questions encouraged.

## 2025-06-06 NOTE — NURSING NOTE
Discharge planning reviewed with patient at bedside.  Medications, new and existing, reviewed with patient.  Medications delivered by [facility] to Beds.  Follow up appointments, new and existing also reviewed with patient.  Dietician education given by Dietician with supplemental paperwork to take home.  All questions answered from patient and patient voiced understanding of all discussed.  IV and Tele removed.  AVS printed and highlighted for patient to take home.  Nurse updated on all information,  Patient transported home by wife.

## 2025-06-08 LAB
PSA FREE MFR SERPL: 40 %
PSA FREE SERPL-MCNC: 0.2 NG/ML
PSA SERPL IA-MCNC: 0.5 NG/ML (ref 0–4)

## 2025-06-14 LAB
ATRIAL RATE: 87 BPM
P AXIS: -6 DEGREES
P OFFSET: 127 MS
P ONSET: 54 MS
PR INTERVAL: 314 MS
Q ONSET: 211 MS
QRS COUNT: 15 BEATS
QRS DURATION: 132 MS
QT INTERVAL: 356 MS
QTC CALCULATION(BAZETT): 428 MS
QTC FREDERICIA: 402 MS
R AXIS: -72 DEGREES
T AXIS: -6 DEGREES
T OFFSET: 389 MS
VENTRICULAR RATE: 87 BPM

## 2025-08-09 ENCOUNTER — HOSPITAL ENCOUNTER (INPATIENT)
Facility: HOSPITAL | Age: 75
End: 2025-08-09
Attending: EMERGENCY MEDICINE | Admitting: INTERNAL MEDICINE
Payer: MEDICARE

## 2025-08-09 ENCOUNTER — APPOINTMENT (OUTPATIENT)
Dept: RADIOLOGY | Facility: HOSPITAL | Age: 75
End: 2025-08-09
Payer: MEDICARE

## 2025-08-09 ENCOUNTER — APPOINTMENT (OUTPATIENT)
Dept: CARDIOLOGY | Facility: HOSPITAL | Age: 75
End: 2025-08-09
Payer: MEDICARE

## 2025-08-09 DIAGNOSIS — R09.02 HYPOXIA: ICD-10-CM

## 2025-08-09 DIAGNOSIS — E78.5 HYPERLIPIDEMIA, UNSPECIFIED HYPERLIPIDEMIA TYPE: ICD-10-CM

## 2025-08-09 DIAGNOSIS — D64.9 ANEMIA, UNSPECIFIED TYPE: ICD-10-CM
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
## 2025-08-09 DIAGNOSIS — F41.9 ANXIETY: ICD-10-CM

## 2025-08-09 DIAGNOSIS — R53.1 WEAKNESS: ICD-10-CM

## 2025-08-09 DIAGNOSIS — E87.20 METABOLIC ACIDOSIS: Primary | ICD-10-CM

## 2025-08-09 DIAGNOSIS — R41.82 ALTERED MENTAL STATUS, UNSPECIFIED ALTERED MENTAL STATUS TYPE: ICD-10-CM

## 2025-08-09 PROBLEM — R29.6 FALLS: Status: ACTIVE | Noted: 2025-08-09

## 2025-08-09 LAB
ALBUMIN SERPL BCP-MCNC: 4 G/DL (ref 3.4–5)
ALP SERPL-CCNC: 55 U/L (ref 33–136)
ALT SERPL W P-5'-P-CCNC: 19 U/L (ref 10–52)
ANION GAP BLDV CALCULATED.4IONS-SCNC: 16 MMOL/L (ref 10–25)
ANION GAP SERPL CALC-SCNC: 15 MMOL/L (ref 10–20)
APAP SERPL-MCNC: <10 UG/ML (ref ?–30)
APPEARANCE UR: CLEAR
APTT PPP: 29 SECONDS (ref 26–36)
AST SERPL W P-5'-P-CCNC: 17 U/L (ref 9–39)
BASE EXCESS BLDV CALC-SCNC: -7.2 MMOL/L (ref -2–3)
BASE EXCESS BLDV CALC-SCNC: -8 MMOL/L (ref -2–3)
BILIRUB SERPL-MCNC: 0.2 MG/DL (ref 0–1.2)
BILIRUB UR STRIP.AUTO-MCNC: NEGATIVE MG/DL
BNP SERPL-MCNC: 43 PG/ML (ref 0–99)
BODY TEMPERATURE: 37 DEGREES CELSIUS
BODY TEMPERATURE: 37 DEGREES CELSIUS
BUN SERPL-MCNC: 26 MG/DL (ref 6–23)
CA-I BLDV-SCNC: 1.07 MMOL/L (ref 1.1–1.33)
CALCIUM SERPL-MCNC: 8.4 MG/DL (ref 8.6–10.3)
CARDIAC TROPONIN I PNL SERPL HS: 8 NG/L (ref 0–20)
CHLORIDE BLDV-SCNC: 104 MMOL/L (ref 98–107)
CHLORIDE SERPL-SCNC: 103 MMOL/L (ref 98–107)
CO2 SERPL-SCNC: 21 MMOL/L (ref 21–32)
COLOR UR: YELLOW
CREAT SERPL-MCNC: 1.24 MG/DL (ref 0.5–1.3)
CRITICAL CALL TIME: 1038
CRITICAL CALL TIME: 859
CRITICAL CALLED BY: ABNORMAL
CRITICAL CALLED BY: ABNORMAL
CRITICAL CALLED TO: ABNORMAL
CRITICAL CALLED TO: ABNORMAL
CRITICAL READ BACK: ABNORMAL
CRITICAL READ BACK: ABNORMAL
EGFRCR SERPLBLD CKD-EPI 2021: 61 ML/MIN/1.73M*2
ERYTHROCYTE [DISTWIDTH] IN BLOOD BY AUTOMATED COUNT: 12.8 % (ref 11.5–14.5)
ETHANOL SERPL-MCNC: 251 MG/DL
GLUCOSE BLD MANUAL STRIP-MCNC: 177 MG/DL (ref 74–99)
GLUCOSE BLD MANUAL STRIP-MCNC: 89 MG/DL (ref 74–99)
GLUCOSE BLDV-MCNC: 273 MG/DL (ref 74–99)
GLUCOSE SERPL-MCNC: 253 MG/DL (ref 74–99)
GLUCOSE UR STRIP.AUTO-MCNC: ABNORMAL MG/DL
HCO3 BLDV-SCNC: 18.9 MMOL/L (ref 22–26)
HCO3 BLDV-SCNC: 20.5 MMOL/L (ref 22–26)
HCT VFR BLD AUTO: 38.7 % (ref 41–52)
HCT VFR BLD EST: 38 % (ref 41–52)
HGB BLD-MCNC: 12.1 G/DL (ref 13.5–17.5)
HGB BLDV-MCNC: 12.6 G/DL (ref 13.5–17.5)
INHALED O2 CONCENTRATION: 21 %
INHALED O2 CONCENTRATION: 28 %
INR PPP: 1 (ref 0.9–1.1)
KETONES UR STRIP.AUTO-MCNC: NEGATIVE MG/DL
LACTATE BLDV-SCNC: 3.7 MMOL/L (ref 0.4–2)
LACTATE SERPL-SCNC: 2.6 MMOL/L (ref 0.4–2)
LACTATE SERPL-SCNC: 4 MMOL/L (ref 0.4–2)
LEUKOCYTE ESTERASE UR QL STRIP.AUTO: NEGATIVE
LIPASE SERPL-CCNC: 12 U/L (ref 9–82)
MAGNESIUM SERPL-MCNC: 1.8 MG/DL (ref 1.6–2.4)
MCH RBC QN AUTO: 30.9 PG (ref 26–34)
MCHC RBC AUTO-ENTMCNC: 31.3 G/DL (ref 32–36)
MCV RBC AUTO: 99 FL (ref 80–100)
NITRITE UR QL STRIP.AUTO: NEGATIVE
NRBC BLD-RTO: 0 /100 WBCS (ref 0–0)
OSMOLALITY SERPL: 362 MOSM/KG (ref 280–300)
OXYHGB MFR BLDV: 80 % (ref 45–75)
OXYHGB MFR BLDV: 80.4 % (ref 45–75)
PCO2 BLDV: 43 MM HG (ref 41–51)
PCO2 BLDV: 49 MM HG (ref 41–51)
PH BLDV: 7.23 PH (ref 7.33–7.43)
PH BLDV: 7.25 PH (ref 7.33–7.43)
PH UR STRIP.AUTO: 5 [PH]
PLATELET # BLD AUTO: 228 X10*3/UL (ref 150–450)
PO2 BLDV: 53 MM HG (ref 35–45)
PO2 BLDV: 53 MM HG (ref 35–45)
POTASSIUM BLDV-SCNC: 3.7 MMOL/L (ref 3.5–5.3)
POTASSIUM SERPL-SCNC: 3.7 MMOL/L (ref 3.5–5.3)
PROT SERPL-MCNC: 6.3 G/DL (ref 6.4–8.2)
PROT UR STRIP.AUTO-MCNC: NEGATIVE MG/DL
PROTHROMBIN TIME: 11.3 SECONDS (ref 9.8–12.4)
RBC # BLD AUTO: 3.91 X10*6/UL (ref 4.5–5.9)
RBC # UR STRIP.AUTO: NEGATIVE MG/DL
SALICYLATES SERPL-MCNC: <3 MG/DL (ref ?–20)
SAO2 % BLDV: 81 % (ref 45–75)
SAO2 % BLDV: 82 % (ref 45–75)
SODIUM BLDV-SCNC: 135 MMOL/L (ref 136–145)
SODIUM SERPL-SCNC: 135 MMOL/L (ref 136–145)
SP GR UR STRIP.AUTO: 1.02
UROBILINOGEN UR STRIP.AUTO-MCNC: NORMAL MG/DL
WBC # BLD AUTO: 7.8 X10*3/UL (ref 4.4–11.3)

## 2025-08-09 PROCEDURE — 93005 ELECTROCARDIOGRAM TRACING: CPT

## 2025-08-09 PROCEDURE — 83880 ASSAY OF NATRIURETIC PEPTIDE: CPT

## 2025-08-09 PROCEDURE — 76705 ECHO EXAM OF ABDOMEN: CPT | Performed by: RADIOLOGY

## 2025-08-09 PROCEDURE — 2500000002 HC RX 250 W HCPCS SELF ADMINISTERED DRUGS (ALT 637 FOR MEDICARE OP, ALT 636 FOR OP/ED)

## 2025-08-09 PROCEDURE — 85610 PROTHROMBIN TIME: CPT | Performed by: EMERGENCY MEDICINE

## 2025-08-09 PROCEDURE — 99223 1ST HOSP IP/OBS HIGH 75: CPT

## 2025-08-09 PROCEDURE — 84132 ASSAY OF SERUM POTASSIUM: CPT | Performed by: EMERGENCY MEDICINE

## 2025-08-09 PROCEDURE — 51798 US URINE CAPACITY MEASURE: CPT

## 2025-08-09 PROCEDURE — 83605 ASSAY OF LACTIC ACID: CPT | Performed by: EMERGENCY MEDICINE

## 2025-08-09 PROCEDURE — 71275 CT ANGIOGRAPHY CHEST: CPT | Performed by: RADIOLOGY

## 2025-08-09 PROCEDURE — 80143 DRUG ASSAY ACETAMINOPHEN: CPT | Performed by: EMERGENCY MEDICINE

## 2025-08-09 PROCEDURE — 71045 X-RAY EXAM CHEST 1 VIEW: CPT | Performed by: STUDENT IN AN ORGANIZED HEALTH CARE EDUCATION/TRAINING PROGRAM

## 2025-08-09 PROCEDURE — 2500000004 HC RX 250 GENERAL PHARMACY W/ HCPCS (ALT 636 FOR OP/ED)

## 2025-08-09 PROCEDURE — 82805 BLOOD GASES W/O2 SATURATION: CPT | Performed by: EMERGENCY MEDICINE

## 2025-08-09 PROCEDURE — 36415 COLL VENOUS BLD VENIPUNCTURE: CPT | Performed by: EMERGENCY MEDICINE

## 2025-08-09 PROCEDURE — 96360 HYDRATION IV INFUSION INIT: CPT

## 2025-08-09 PROCEDURE — 85730 THROMBOPLASTIN TIME PARTIAL: CPT | Performed by: EMERGENCY MEDICINE

## 2025-08-09 PROCEDURE — 83930 ASSAY OF BLOOD OSMOLALITY: CPT | Mod: PARLAB | Performed by: EMERGENCY MEDICINE

## 2025-08-09 PROCEDURE — 1200000002 HC GENERAL ROOM WITH TELEMETRY DAILY

## 2025-08-09 PROCEDURE — 96361 HYDRATE IV INFUSION ADD-ON: CPT

## 2025-08-09 PROCEDURE — 80320 DRUG SCREEN QUANTALCOHOLS: CPT | Performed by: EMERGENCY MEDICINE

## 2025-08-09 PROCEDURE — 73030 X-RAY EXAM OF SHOULDER: CPT | Mod: RIGHT SIDE | Performed by: RADIOLOGY

## 2025-08-09 PROCEDURE — 70450 CT HEAD/BRAIN W/O DYE: CPT

## 2025-08-09 PROCEDURE — 73030 X-RAY EXAM OF SHOULDER: CPT | Mod: RT

## 2025-08-09 PROCEDURE — 83735 ASSAY OF MAGNESIUM: CPT | Performed by: EMERGENCY MEDICINE

## 2025-08-09 PROCEDURE — 85027 COMPLETE CBC AUTOMATED: CPT | Performed by: EMERGENCY MEDICINE

## 2025-08-09 PROCEDURE — 74177 CT ABD & PELVIS W/CONTRAST: CPT

## 2025-08-09 PROCEDURE — 81003 URINALYSIS AUTO W/O SCOPE: CPT | Performed by: EMERGENCY MEDICINE

## 2025-08-09 PROCEDURE — 83690 ASSAY OF LIPASE: CPT | Performed by: EMERGENCY MEDICINE

## 2025-08-09 PROCEDURE — 71045 X-RAY EXAM CHEST 1 VIEW: CPT

## 2025-08-09 PROCEDURE — 76705 ECHO EXAM OF ABDOMEN: CPT

## 2025-08-09 PROCEDURE — 73080 X-RAY EXAM OF ELBOW: CPT | Mod: RT

## 2025-08-09 PROCEDURE — 84484 ASSAY OF TROPONIN QUANT: CPT | Performed by: EMERGENCY MEDICINE

## 2025-08-09 PROCEDURE — 82330 ASSAY OF CALCIUM: CPT | Performed by: EMERGENCY MEDICINE

## 2025-08-09 PROCEDURE — 2550000001 HC RX 255 CONTRASTS: Performed by: EMERGENCY MEDICINE

## 2025-08-09 PROCEDURE — 99285 EMERGENCY DEPT VISIT HI MDM: CPT | Mod: 25 | Performed by: EMERGENCY MEDICINE

## 2025-08-09 PROCEDURE — 82810 BLOOD GASES O2 SAT ONLY: CPT | Performed by: EMERGENCY MEDICINE

## 2025-08-09 PROCEDURE — 82947 ASSAY GLUCOSE BLOOD QUANT: CPT

## 2025-08-09 PROCEDURE — 2500000004 HC RX 250 GENERAL PHARMACY W/ HCPCS (ALT 636 FOR OP/ED): Performed by: EMERGENCY MEDICINE

## 2025-08-09 PROCEDURE — 71275 CT ANGIOGRAPHY CHEST: CPT

## 2025-08-09 PROCEDURE — 70450 CT HEAD/BRAIN W/O DYE: CPT | Performed by: RADIOLOGY

## 2025-08-09 PROCEDURE — 74177 CT ABD & PELVIS W/CONTRAST: CPT | Performed by: RADIOLOGY

## 2025-08-09 PROCEDURE — 2500000001 HC RX 250 WO HCPCS SELF ADMINISTERED DRUGS (ALT 637 FOR MEDICARE OP)

## 2025-08-09 RX ORDER — ACETAMINOPHEN 325 MG/1
650 TABLET ORAL EVERY 4 HOURS PRN
Status: ACTIVE | OUTPATIENT
Start: 2025-08-09

## 2025-08-09 RX ORDER — HYDRALAZINE HYDROCHLORIDE 25 MG/1
25 TABLET, FILM COATED ORAL 2 TIMES DAILY
Status: DISPENSED | OUTPATIENT
Start: 2025-08-09

## 2025-08-09 RX ORDER — ACETAMINOPHEN 650 MG/1
650 SUPPOSITORY RECTAL EVERY 4 HOURS PRN
Status: ACTIVE | OUTPATIENT
Start: 2025-08-09

## 2025-08-09 RX ORDER — ACETAMINOPHEN 160 MG/5ML
650 SOLUTION ORAL EVERY 4 HOURS PRN
Status: ACTIVE | OUTPATIENT
Start: 2025-08-09

## 2025-08-09 RX ORDER — INSULIN LISPRO 100 [IU]/ML
0-5 INJECTION, SOLUTION INTRAVENOUS; SUBCUTANEOUS
Status: DISPENSED | OUTPATIENT
Start: 2025-08-09

## 2025-08-09 RX ORDER — LISINOPRIL 20 MG/1
20 TABLET ORAL DAILY
Status: DISPENSED | OUTPATIENT
Start: 2025-08-09

## 2025-08-09 RX ORDER — MULTIVIT-MIN/IRON FUM/FOLIC AC 7.5 MG-4
1 TABLET ORAL DAILY
Status: DISPENSED | OUTPATIENT
Start: 2025-08-09

## 2025-08-09 RX ORDER — ATORVASTATIN CALCIUM 40 MG/1
40 TABLET, FILM COATED ORAL DAILY
Status: DISPENSED | OUTPATIENT
Start: 2025-08-09

## 2025-08-09 RX ORDER — DEXTROSE 50 % IN WATER (D50W) INTRAVENOUS SYRINGE
12.5
Status: ACTIVE | OUTPATIENT
Start: 2025-08-09

## 2025-08-09 RX ORDER — DULOXETIN HYDROCHLORIDE 30 MG/1
60 CAPSULE, DELAYED RELEASE ORAL DAILY
Status: DISPENSED | OUTPATIENT
Start: 2025-08-09

## 2025-08-09 RX ORDER — SODIUM CHLORIDE 9 MG/ML
75 INJECTION, SOLUTION INTRAVENOUS CONTINUOUS
Status: ACTIVE | OUTPATIENT
Start: 2025-08-09 | End: 2025-08-10

## 2025-08-09 RX ORDER — FOLIC ACID 1 MG/1
1 TABLET ORAL DAILY
Status: DISPENSED | OUTPATIENT
Start: 2025-08-09

## 2025-08-09 RX ORDER — NAPROXEN SODIUM 220 MG/1
81 TABLET, FILM COATED ORAL DAILY
Status: DISPENSED | OUTPATIENT
Start: 2025-08-09

## 2025-08-09 RX ORDER — DOCUSATE SODIUM 100 MG/1
100 CAPSULE, LIQUID FILLED ORAL DAILY
Status: ACTIVE | OUTPATIENT
Start: 2025-08-09

## 2025-08-09 RX ORDER — DEXTROSE 50 % IN WATER (D50W) INTRAVENOUS SYRINGE
25
Status: ACTIVE | OUTPATIENT
Start: 2025-08-09

## 2025-08-09 RX ORDER — TAMSULOSIN HYDROCHLORIDE 0.4 MG/1
0.4 CAPSULE ORAL NIGHTLY
Status: DISPENSED | OUTPATIENT
Start: 2025-08-09

## 2025-08-09 RX ORDER — TAMSULOSIN HYDROCHLORIDE 0.4 MG/1
0.4 CAPSULE ORAL NIGHTLY
Status: ON HOLD | COMMUNITY
Start: 2025-06-27

## 2025-08-09 RX ORDER — DIAZEPAM 5 MG/ML
10 INJECTION, SOLUTION INTRAMUSCULAR; INTRAVENOUS EVERY 2 HOUR PRN
Status: ACTIVE | OUTPATIENT
Start: 2025-08-09

## 2025-08-09 RX ORDER — CLOPIDOGREL BISULFATE 75 MG/1
75 TABLET ORAL DAILY
Status: DISPENSED | OUTPATIENT
Start: 2025-08-09

## 2025-08-09 RX ORDER — POLYETHYLENE GLYCOL 3350 17 G/17G
17 POWDER, FOR SOLUTION ORAL DAILY
Status: ACTIVE | OUTPATIENT
Start: 2025-08-09

## 2025-08-09 RX ORDER — GABAPENTIN 100 MG/1
100 CAPSULE ORAL 3 TIMES DAILY
Status: ON HOLD | COMMUNITY
Start: 2025-07-16

## 2025-08-09 RX ORDER — GABAPENTIN 100 MG/1
100 CAPSULE ORAL 3 TIMES DAILY
Status: DISPENSED | OUTPATIENT
Start: 2025-08-09

## 2025-08-09 RX ORDER — LANOLIN ALCOHOL/MO/W.PET/CERES
100 CREAM (GRAM) TOPICAL DAILY
Status: ACTIVE | OUTPATIENT
Start: 2025-08-12

## 2025-08-09 RX ORDER — THIAMINE HYDROCHLORIDE 100 MG/ML
100 INJECTION, SOLUTION INTRAMUSCULAR; INTRAVENOUS DAILY
Status: DISPENSED | OUTPATIENT
Start: 2025-08-09 | End: 2025-08-12

## 2025-08-09 RX ADMIN — IOHEXOL 75 ML: 350 INJECTION, SOLUTION INTRAVENOUS at 11:44

## 2025-08-09 RX ADMIN — CLOPIDOGREL BISULFATE 75 MG: 75 TABLET, FILM COATED ORAL at 16:15

## 2025-08-09 RX ADMIN — GABAPENTIN 100 MG: 100 CAPSULE ORAL at 20:41

## 2025-08-09 RX ADMIN — SODIUM CHLORIDE, SODIUM LACTATE, POTASSIUM CHLORIDE, AND CALCIUM CHLORIDE 1000 ML: .6; .31; .03; .02 INJECTION, SOLUTION INTRAVENOUS at 10:52

## 2025-08-09 RX ADMIN — SODIUM CHLORIDE, SODIUM LACTATE, POTASSIUM CHLORIDE, AND CALCIUM CHLORIDE 1000 ML: .6; .31; .03; .02 INJECTION, SOLUTION INTRAVENOUS at 08:50

## 2025-08-09 RX ADMIN — INSULIN LISPRO 1 UNITS: 100 INJECTION, SOLUTION INTRAVENOUS; SUBCUTANEOUS at 16:14

## 2025-08-09 RX ADMIN — TAMSULOSIN HYDROCHLORIDE 0.4 MG: 0.4 CAPSULE ORAL at 20:41

## 2025-08-09 RX ADMIN — THIAMINE HYDROCHLORIDE 100 MG: 100 INJECTION, SOLUTION INTRAMUSCULAR; INTRAVENOUS at 16:14

## 2025-08-09 RX ADMIN — GABAPENTIN 100 MG: 100 CAPSULE ORAL at 16:26

## 2025-08-09 RX ADMIN — ATORVASTATIN CALCIUM 40 MG: 40 TABLET, FILM COATED ORAL at 16:15

## 2025-08-09 RX ADMIN — FOLIC ACID 1 MG: 1 TABLET ORAL at 16:14

## 2025-08-09 RX ADMIN — ASPIRIN 81 MG CHEWABLE TABLET 81 MG: 81 TABLET CHEWABLE at 16:14

## 2025-08-09 RX ADMIN — HYDRALAZINE HYDROCHLORIDE 25 MG: 25 TABLET ORAL at 20:41

## 2025-08-09 RX ADMIN — Medication 1 TABLET: at 16:15

## 2025-08-09 RX ADMIN — DULOXETINE 60 MG: 30 CAPSULE, DELAYED RELEASE ORAL at 16:14

## 2025-08-09 RX ADMIN — LISINOPRIL 20 MG: 20 TABLET ORAL at 16:15

## 2025-08-09 RX ADMIN — SODIUM CHLORIDE 75 ML/HR: 0.9 INJECTION, SOLUTION INTRAVENOUS at 16:15

## 2025-08-09 SDOH — ECONOMIC STABILITY: INCOME INSECURITY: IN THE PAST 12 MONTHS HAS THE ELECTRIC, GAS, OIL, OR WATER COMPANY THREATENED TO SHUT OFF SERVICES IN YOUR HOME?: NO

## 2025-08-09 SDOH — SOCIAL STABILITY: SOCIAL INSECURITY: DO YOU FEEL UNSAFE GOING BACK TO THE PLACE WHERE YOU ARE LIVING?: NO

## 2025-08-09 SDOH — ECONOMIC STABILITY: HOUSING INSECURITY: IN THE LAST 12 MONTHS, WAS THERE A TIME WHEN YOU WERE NOT ABLE TO PAY THE MORTGAGE OR RENT ON TIME?: NO

## 2025-08-09 SDOH — ECONOMIC STABILITY: FOOD INSECURITY: HOW HARD IS IT FOR YOU TO PAY FOR THE VERY BASICS LIKE FOOD, HOUSING, MEDICAL CARE, AND HEATING?: NOT VERY HARD

## 2025-08-09 SDOH — SOCIAL STABILITY: SOCIAL INSECURITY: WITHIN THE LAST YEAR, HAVE YOU BEEN AFRAID OF YOUR PARTNER OR EX-PARTNER?: NO

## 2025-08-09 SDOH — ECONOMIC STABILITY: FOOD INSECURITY: WITHIN THE PAST 12 MONTHS, THE FOOD YOU BOUGHT JUST DIDN'T LAST AND YOU DIDN'T HAVE MONEY TO GET MORE.: NEVER TRUE

## 2025-08-09 SDOH — ECONOMIC STABILITY: FOOD INSECURITY: WITHIN THE PAST 12 MONTHS, YOU WORRIED THAT YOUR FOOD WOULD RUN OUT BEFORE YOU GOT THE MONEY TO BUY MORE.: NEVER TRUE

## 2025-08-09 SDOH — SOCIAL STABILITY: SOCIAL INSECURITY: WERE YOU ABLE TO COMPLETE ALL THE BEHAVIORAL HEALTH SCREENINGS?: YES

## 2025-08-09 SDOH — ECONOMIC STABILITY: HOUSING INSECURITY: IN THE PAST 12 MONTHS, HOW MANY TIMES HAVE YOU MOVED WHERE YOU WERE LIVING?: 0

## 2025-08-09 SDOH — SOCIAL STABILITY: SOCIAL INSECURITY: HAS ANYONE EVER THREATENED TO HURT YOUR FAMILY OR YOUR PETS?: NO

## 2025-08-09 SDOH — SOCIAL STABILITY: SOCIAL INSECURITY: WITHIN THE LAST YEAR, HAVE YOU BEEN HUMILIATED OR EMOTIONALLY ABUSED IN OTHER WAYS BY YOUR PARTNER OR EX-PARTNER?: NO

## 2025-08-09 SDOH — ECONOMIC STABILITY: HOUSING INSECURITY: AT ANY TIME IN THE PAST 12 MONTHS, WERE YOU HOMELESS OR LIVING IN A SHELTER (INCLUDING NOW)?: NO

## 2025-08-09 SDOH — SOCIAL STABILITY: SOCIAL INSECURITY: ARE YOU OR HAVE YOU BEEN THREATENED OR ABUSED PHYSICALLY, EMOTIONALLY, OR SEXUALLY BY ANYONE?: NO

## 2025-08-09 SDOH — SOCIAL STABILITY: SOCIAL INSECURITY: DOES ANYONE TRY TO KEEP YOU FROM HAVING/CONTACTING OTHER FRIENDS OR DOING THINGS OUTSIDE YOUR HOME?: NO

## 2025-08-09 SDOH — ECONOMIC STABILITY: TRANSPORTATION INSECURITY: IN THE PAST 12 MONTHS, HAS LACK OF TRANSPORTATION KEPT YOU FROM MEDICAL APPOINTMENTS OR FROM GETTING MEDICATIONS?: NO

## 2025-08-09 SDOH — SOCIAL STABILITY: SOCIAL INSECURITY: HAVE YOU HAD ANY THOUGHTS OF HARMING ANYONE ELSE?: NO

## 2025-08-09 SDOH — SOCIAL STABILITY: SOCIAL INSECURITY: HAVE YOU HAD THOUGHTS OF HARMING ANYONE ELSE?: NO

## 2025-08-09 SDOH — SOCIAL STABILITY: SOCIAL INSECURITY: ARE THERE ANY APPARENT SIGNS OF INJURIES/BEHAVIORS THAT COULD BE RELATED TO ABUSE/NEGLECT?: NO

## 2025-08-09 SDOH — SOCIAL STABILITY: SOCIAL INSECURITY: ABUSE: ADULT

## 2025-08-09 SDOH — SOCIAL STABILITY: SOCIAL INSECURITY: DO YOU FEEL ANYONE HAS EXPLOITED OR TAKEN ADVANTAGE OF YOU FINANCIALLY OR OF YOUR PERSONAL PROPERTY?: NO

## 2025-08-09 ASSESSMENT — ACTIVITIES OF DAILY LIVING (ADL)
TOILETING: INDEPENDENT
HEARING - RIGHT EAR: FUNCTIONAL
ADEQUATE_TO_COMPLETE_ADL: YES
HEARING - LEFT EAR: FUNCTIONAL
DRESSING YOURSELF: INDEPENDENT
FEEDING YOURSELF: INDEPENDENT
BATHING: INDEPENDENT
WALKS IN HOME: INDEPENDENT
JUDGMENT_ADEQUATE_SAFELY_COMPLETE_DAILY_ACTIVITIES: NO
PATIENT'S MEMORY ADEQUATE TO SAFELY COMPLETE DAILY ACTIVITIES?: NO
LACK_OF_TRANSPORTATION: NO
LACK_OF_TRANSPORTATION: NO
GROOMING: INDEPENDENT

## 2025-08-09 ASSESSMENT — LIFESTYLE VARIABLES
VISUAL DISTURBANCES: NOT PRESENT
ORIENTATION AND CLOUDING OF SENSORIUM: ORIENTED AND CAN DO SERIAL ADDITIONS
TREMOR: NO TREMOR
HEADACHE, FULLNESS IN HEAD: NOT PRESENT
AGITATION: NORMAL ACTIVITY
SKIP TO QUESTIONS 9-10: 0
HEADACHE, FULLNESS IN HEAD: NOT PRESENT
AUDITORY DISTURBANCES: NOT PRESENT
NAUSEA AND VOMITING: NO NAUSEA AND NO VOMITING
VISUAL DISTURBANCES: NOT PRESENT
NAUSEA AND VOMITING: NO NAUSEA AND NO VOMITING
AGITATION: NORMAL ACTIVITY
NAUSEA AND VOMITING: NO NAUSEA AND NO VOMITING
VISUAL DISTURBANCES: NOT PRESENT
TOTAL SCORE: 0
ANXIETY: NO ANXIETY, AT EASE
HEADACHE, FULLNESS IN HEAD: NOT PRESENT
HOW MANY STANDARD DRINKS CONTAINING ALCOHOL DO YOU HAVE ON A TYPICAL DAY: PATIENT DECLINED
PAROXYSMAL SWEATS: NO SWEAT VISIBLE
ORIENTATION AND CLOUDING OF SENSORIUM: ORIENTED AND CAN DO SERIAL ADDITIONS
AUDITORY DISTURBANCES: NOT PRESENT
ANXIETY: NO ANXIETY, AT EASE
VISUAL DISTURBANCES: NOT PRESENT
AUDITORY DISTURBANCES: NOT PRESENT
ANXIETY: NO ANXIETY, AT EASE
AUDIT-C TOTAL SCORE: -1
TREMOR: NO TREMOR
PAROXYSMAL SWEATS: NO SWEAT VISIBLE
AUDIT-C TOTAL SCORE: -1
ORIENTATION AND CLOUDING OF SENSORIUM: ORIENTED AND CAN DO SERIAL ADDITIONS
HOW OFTEN DO YOU HAVE 6 OR MORE DRINKS ON ONE OCCASION: PATIENT DECLINED
PAROXYSMAL SWEATS: NO SWEAT VISIBLE
HEADACHE, FULLNESS IN HEAD: NOT PRESENT
NAUSEA AND VOMITING: NO NAUSEA AND NO VOMITING
TREMOR: NO TREMOR
ORIENTATION AND CLOUDING OF SENSORIUM: ORIENTED AND CAN DO SERIAL ADDITIONS
HOW OFTEN DO YOU HAVE A DRINK CONTAINING ALCOHOL: 2-3 TIMES A WEEK
AUDITORY DISTURBANCES: NOT PRESENT
ANXIETY: NO ANXIETY, AT EASE
AGITATION: NORMAL ACTIVITY
TREMOR: NO TREMOR
AGITATION: NORMAL ACTIVITY
PAROXYSMAL SWEATS: NO SWEAT VISIBLE
TOTAL SCORE: 0

## 2025-08-09 ASSESSMENT — PATIENT HEALTH QUESTIONNAIRE - PHQ9
2. FEELING DOWN, DEPRESSED OR HOPELESS: NOT AT ALL
SUM OF ALL RESPONSES TO PHQ9 QUESTIONS 1 & 2: 0
1. LITTLE INTEREST OR PLEASURE IN DOING THINGS: NOT AT ALL

## 2025-08-09 ASSESSMENT — COGNITIVE AND FUNCTIONAL STATUS - GENERAL
PATIENT BASELINE BEDBOUND: NO
DAILY ACTIVITIY SCORE: 24
MOBILITY SCORE: 24

## 2025-08-09 ASSESSMENT — PAIN SCALES - GENERAL
PAINLEVEL_OUTOF10: 0 - NO PAIN

## 2025-08-09 NOTE — ED PROVIDER NOTES
"  Emergency Department Provider Note       History of Present Illness     History provided by: Patient and EMS  Limitations to History: Intoxication and Uncooperative  External Records Reviewed with Brief Summary: None    HPI:  oMdesto Lorenz is a 74 y.o. male with medical history of DM2, HLD, stroke, b/l carotid artery stenosis s/p carotid shunt who presents with suspected intoxication. [place] was found by neighbors slumped over a table next to a bottle of Andree. On arrival he was covered in vomit and unable to answer questions.     [place]'s wife, Lisa, arrived ~2 hours later and was able to provide some additional history. She reports he quit drinking in 2004 but over the past month she has noticed that he smells like alcohol. She also described behavior change during this time, stating he has been \"nasty\" to her. [place] spoke up at this point and was asking repeatedly what is wrong with him and how he got to the emergency department. On further questioning he reports buying a bottle of andree this morning because he has been upset with his home life. He denies being in pain. Review of systems limited by altered mental status.     Physical Exam   Triage vitals:  T 36.4 °C (97.5 °F)  HR 70  BP 89/61  RR (!) 22  O2 (!) 90 % None (Room air)      General: uncooperative, moaning, vomit on face and shirt  Head: normocephalic, atraumatic  Eyes: PERRL, no scleral icterus  Mouth: mucous membranes moist, pieces of undigested food in mouth  Neck: supple, ROM intact, trachea is midline  CV: RRR, normal S1/S2, no murmurs/rubs/gallops  Resp: normal respiratory effort, auscultation limited by moaning  ABD: soft, nontender, nondistended, no guarding or rigidity, normal bowel sounds, no peritoneal signs  Neuro: GCS 10 (eye 1, verbal 4, motor 5). Speech is slurred. Able to state name and birthday.   MSK: No cyanosis, clubbing, or edema. Moves all extremities with good tone. No palpable deformities.  Skin: warm, dry, and " intact    Medical Decision Making & ED Course   Medical Decision Makin y.o. male with medical history of DM2, HLD, stroke, b/l carotid artery stenosis s/p carotid shunt who presents for evaluation of altered mental status. Upon initial evaluation, patient appeared intoxicated, covered in vomit and loose stool. Per EMS his blood sugar was >500. Vital signs notable for hypotension, hypoxia, and tachypnea. Blood pressure improved with fluids, hypoxia improved with 2L supplemental oxygen. Differential at this time includes, but is not limited to, EtOH ingestion, HHS, DKA, TBI, intracranial hemorrhage, and stroke.     Laboratory studies notable for non-anion gap metabolic acidosis, likely secondary to diarrhea. Considered obtaining urine pH and electrolytes to evaluate for renal tubular acidosis, however his creatinine is unchanged from baseline. Initial lactate elevated at 3.7, will reassess after fluids. He is hyperglycemic (273), though lower than would be expected for DKA or HHS. He is not on medications associated with euglycemic DKA. CT head unremarkable and no focal neurologic findings on repeat physical exam so low concern for intracranial pathology. EtOH elevated at 251. With the above work-up we feel EtOH intoxication is responsible for his altered mental status.     Given new oxygen requirements, there is also concern for aspiration pneumonia, ACS, and pulmonary embolism. We obtained a chest x-ray which was negative for consolidations and pleural effusions. CT angio chest was obtained which showed bibasilar atelectasis but no evidence of pulmonary embolism. Initial troponin negative and no ST changes on ECG so low concern for ACS. BNP is within normal limits and patient is euvolemic on exam so low concern for heart failure. Repeat lactate after fluids was increased from prior (3.7 --> 4) which raised concern for sepsis. Further evaluation with CT abdomen and pelvis was unrevealing. Urinalysis negative  for WBCs, nitrites, and leukocyte esterase. At this point we felt further evaluation and treatment of his new oxygen requirements, acidosis, and elevated lactate was warranted. Patient was admitted to the inpatient unit in hemodynamically stable condition.        Social Determinants of Health which Significantly Impact Care: Social Determinants of Health which Significantly Impact Care: Substance use disorder and Problems in relationship with spouse or partner     EKG Independent Interpretation: EKG interpreted by myself. Please see ED Course for full interpretation.    Independent Result Review and Interpretation: Relevant laboratory and radiographic results were reviewed and independently interpreted by myself.  As necessary, they are commented on in the ED Course.    Chronic conditions affecting the patient's care: As documented above in [facility]    The patient was discussed with the following consultants/services: Hospitalist/Admitting Provider who accepted the patient for admission    Care Considerations: As documented above in [facility]    ED Course:  ED Course as of 08/09/25 1602   Sat Aug 09, 2025   0855 ED Attending Documentation: This patient was seen by the acting intern.  I have seen and examined the patient, agree with the workup, evaluation, management and diagnosis. I reviewed and edited the above documentation where necessary. I have looked at the EKG and agree with the interpretation. On my evaluation of the patient: 74-year-old male who presents with concerns from his neighbors that he was moaning to himself.  Found at the table with a bottle of liza.  Patient's sugar is undetectably high, concern for HHS versus DKA versus alcohol intoxication.  He is also on Plavix of concern for acute intracranial pathology.  For me he had his eyes open spontaneously, was moaning that he had no idea what was going on, but did follow commands so was a GCS of 14 for me.  He has no obvious signs of trauma with the exception  of multiple bruises to his hands and arms that look like they are in various stages of healing.  Patient was also hypoxic concerning for aspiration versus some organic lung pathology so chest x-ray was ordered as well.    Edvni Frederick MD  EM Attending Physician [RG]   4106 EKG interpreted by me shows sinus rhythm with a rate of 67, significantly prolonged NC interval constituting a first-degree AV block, otherwise a right bundle branch block and left anterior fascicular block are present, left axis deviation consistent with this otherwise normal ST segments and T waves [RG]   1049 Lactate(!!): 4.0  Repeat lactate after fluids still elevated at 4.0.  Patient is no longer hypotensive, will give 1 more liter of fluid and add on a CT scan of the abdomen to rule out any acute abdominal pathology or septic sources.  [RG]      ED Course User Index  [RG] Edvin Frederick MD         Diagnoses as of 08/09/25 1602   Metabolic acidosis   Hypoxia       Disposition   As a result of their workup, the patient will require admission to the hospital.  The patient was informed of his diagnosis.  The patient was given the opportunity to ask questions and I answered them. The patient agreed to be admitted to the hospital.    Procedures   Procedures    Patient seen and discussed with ED attending physician.    MARISA BELLE, MS4  Emergency Medicine         Marisa Belle  08/09/25 [id number]

## 2025-08-09 NOTE — ED TRIAGE NOTES
Pt presents to er via [facility], per ems neighbor called concerned, pt found slumped over at table with bottle of liza near by covered in emesis and feces, pt is incoherent and a&ox1. Pt has pmh of dm2 and is on plavix pt was given 2 doses of zofran by the squad pta

## 2025-08-09 NOTE — H&P
"History Of Present Illness  Modesto Lorenz is a 74 y.o. male with a past medical history of DMII, stroke, HLD, b/l carotid artery stenosis s/p carotid shunt who presents to the emergency department after patient's neighbor called EMS with concern about patient's wellbeing.  Per report, \"patient found slumped over at table with a bottle of liza nearby, covered in emesis and feces.  Patient is incoherent and alert and oriented x 1.\"  Patient was recently admitted from 6/4-6/6 with altered mental status, concern for stroke.  Patient was seen by neurology while hospitalized.  Given evidence of worsening intracranial stenosis in this patient, it was recommended to increase his atorvastatin to 80 mg p.o. daily.  Patient was to follow-up with Dr. Steven MORGAN with vascular neurology.  Patient was ultimately discharged home in stable condition.  Per ED documentation “Modesto's wife, Lisa, arrived ~2 hours later and was able to provide some additional history. She reports he quit drinking in 2004 but over the past month she has noticed that he smells like alcohol. She also described behavior change during this time, stating he has been \"nasty\" to her. Modesto spoke up at this point and was asking repeatedly what is wrong with him and how he got to the emergency department. On further questioning he reports buying a bottle of liza this morning because he has been upset with his home life. He denies being in pain. Review of systems limited by intoxication.”  At the time of my interview, patient is asleep, but easily arousable.  He is slurring his speech and speaking very loudly, appears intoxicated.  Wife presents to the bedside shortly after.  The patient tells me that he bought a bottle of liza on Friday night and had \"2 glasses.\"  Patient's wife states that she was at her son's, who is a quadriplegic, taking care of him and so she is unsure of how much liza he actually had.  She tells me that patient stopped cold turkey drinking " "alcohol in 2004 after he forgot to pick her up from her hysterectomy.  She states that over the past 1 year, she has smelled alcohol, but dismissed it because she did not believe he had started drinking again.  She states that the smell of alcohol got stronger over the past couple of months, but still had never seen any alcohol in the house.  She states that this morning, their neighbor thought he heard Modesto calling for him inside the house, but when the neighbor went to the front door, nobody answered.  He then found Modesto slumped over at the table with emesis all over him with a bottle of liza beside him.  EMS was subsequently called.  Patient's wife expresses a lot of frustration and concern over this issue.  She states that patient has also been falling a lot recently, with his most recent fall last week.  Patient states that he has pain in his right arm, but denies any pain/injury anywhere else.  Patient's wife is interested in having patient seek help, though she states patient is \"stubborn, holds grudges.\"  She denies any other drug use or tobacco use in the patient.    ED course: On arrival, patient's BP 89/61, heart rate 70, respirations 22, afebrile, saturating 90%.  Now saturating 100% on 2 L NC.  Labs and imaging performed, revealing glucose 253, sodium 135, BUN 26, LFTs WNL.  Lipase WNL.  Magnesium WNL.  Initial troponin negative.  Lactate 4.0.  Fluids given.  No white count.  Hemoglobin slightly low at 12.1.  Platelets WNL.  Initial VBG shows pH of 7.25, pCO2 was normal, HCO3 is 18.9, and lactate 3.7.  Repeat VBG shows pH 7.23, HCO3 is 20.5.  Urinalysis negative for signs of infection.  CT head shows no evidence of acute intracranial process.  Chest x-ray shows no evidence of acute cardiopulmonary process.  CT angio chest for PE shows no PE.  Mild interstitial edema.  Mild dilatation of the ascending aorta up to 4.1 cm.  CT abdomen/pelvis shows nonspecific periportal edema.  Diagnostic considerations " may include overhydration, elevated right heart pressures, or hepatitis.  Delayed opacification of the right kidney compared to the left suggesting arterial stenosis.  EKG, per ED physician, rate of 67 bpm, significantly prolonged LA interval constituting a first-degree AV block, otherwise a right bundle branch block and left anterior fascicular block are present, left axis deviation consistent with this, otherwise normal ST segments and T waves.  Patient given 2 L LR boluses in the ED, lactate ordered and pending.   Past Medical History  Medical History[1]    Surgical History  Surgical History[2]     Social History  He reports that he quit smoking about 4 years ago. His smoking use included cigarettes. He has quit using smokeless tobacco. No history on file for alcohol use and drug use.    Family History  Family History[3]     Allergies  Patient has no known allergies.    Review of Systems  Limited due to intoxication.    Physical Exam  Constitutional:       Appearance: He is not ill-appearing or toxic-appearing.      Comments: Slurred speech, intoxication.   HENT:      Head: Normocephalic and atraumatic.      Nose: Nose normal.      Mouth/Throat:      Mouth: Mucous membranes are moist.      Pharynx: Oropharynx is clear.     Eyes:      Extraocular Movements: Extraocular movements intact.      Conjunctiva/sclera: Conjunctivae normal.      Pupils: Pupils are equal, round, and reactive to light.       Cardiovascular:      Rate and Rhythm: Normal rate and regular rhythm.   Pulmonary:      Effort: Pulmonary effort is normal.      Breath sounds: Normal breath sounds. No wheezing or rales.   Abdominal:      General: Abdomen is flat. There is distension.      Palpations: Abdomen is soft.      Tenderness: There is no abdominal tenderness.     Musculoskeletal:         General: Normal range of motion.      Cervical back: Normal range of motion.      Right lower leg: No edema.      Left lower leg: No edema.     Skin:      "General: Skin is warm and dry.      Findings: Bruising present.     Neurological:      Mental Status: He is disoriented.          Last Recorded Vitals  Blood pressure 126/58, pulse 83, temperature 36.4 °C (97.5 °F), temperature source Temporal, resp. rate (!) 22, height 1.753 m (5' 9\"), weight 82.6 kg (182 lb 1.6 oz), SpO2 100%.    Relevant Results    Scheduled medications  Scheduled Medications[4]  Continuous medications  Continuous Medications[5]  PRN medications  PRN Medications[6]    Results for orders placed or performed during the hospital encounter of 08/09/25 (from the past 24 hours)   BLOOD GAS VENOUS FULL PANEL   Result Value Ref Range    POCT pH, Venous 7.25 (LL) 7.33 - 7.43 pH    POCT pCO2, Venous 43 41 - 51 mm Hg    POCT pO2, Venous 53 (H) 35 - 45 mm Hg    POCT SO2, Venous 82 (H) 45 - 75 %    POCT Oxy Hemoglobin, Venous 80.4 (H) 45.0 - 75.0 %    POCT Hematocrit Calculated, Venous 38.0 (L) 41.0 - 52.0 %    POCT Sodium, Venous 135 (L) 136 - 145 mmol/L    POCT Potassium, Venous 3.7 3.5 - 5.3 mmol/L    POCT Chloride, Venous 104 98 - 107 mmol/L    POCT Ionized Calicum, Venous 1.07 (L) 1.10 - 1.33 mmol/L    POCT Glucose, Venous 273 (H) 74 - 99 mg/dL    POCT Lactate, Venous 3.7 (H) 0.4 - 2.0 mmol/L    POCT Base Excess, Venous -8.0 (L) -2.0 - 3.0 mmol/L    POCT HCO3 Calculated, Venous 18.9 (L) 22.0 - 26.0 mmol/L    POCT Hemoglobin, Venous 12.6 (L) 13.5 - 17.5 g/dL    POCT Anion Gap, Venous 16.0 10.0 - 25.0 mmol/L    Patient Temperature 37.0 degrees Celsius    FiO2 21 %    Critical Called By THIAGO ARROYO RRT     Critical Called To DR CORONA     Critical Call Time 859     Critical Read Back Y    CBC   Result Value Ref Range    WBC 7.8 4.4 - 11.3 x10*3/uL    nRBC 0.0 0.0 - 0.0 /100 WBCs    RBC 3.91 (L) 4.50 - 5.90 x10*6/uL    Hemoglobin 12.1 (L) 13.5 - 17.5 g/dL    Hematocrit 38.7 (L) 41.0 - 52.0 %    MCV 99 80 - 100 fL    MCH 30.9 26.0 - 34.0 pg    MCHC 31.3 (L) 32.0 - 36.0 g/dL    RDW 12.8 11.5 - 14.5 %    " Platelets 228 150 - 450 x10*3/uL   Comprehensive metabolic panel   Result Value Ref Range    Glucose 253 (H) 74 - 99 mg/dL    Sodium 135 (L) 136 - 145 mmol/L    Potassium 3.7 3.5 - 5.3 mmol/L    Chloride 103 98 - 107 mmol/L    Bicarbonate 21 21 - 32 mmol/L    Anion Gap 15 10 - 20 mmol/L    Urea Nitrogen 26 (H) 6 - 23 mg/dL    Creatinine 1.24 0.50 - 1.30 mg/dL    eGFR 61 >60 mL/min/1.73m*2    Calcium 8.4 (L) 8.6 - 10.3 mg/dL    Albumin 4.0 3.4 - 5.0 g/dL    Alkaline Phosphatase 55 33 - 136 U/L    Total Protein 6.3 (L) 6.4 - 8.2 g/dL    AST 17 9 - 39 U/L    Bilirubin, Total 0.2 0.0 - 1.2 mg/dL    ALT 19 10 - 52 U/L   Lipase   Result Value Ref Range    Lipase 12 9 - 82 U/L   Acute Toxicology Panel, Blood   Result Value Ref Range    Acetaminophen <10.0 10.0 - 30.0 ug/mL    Salicylate  <3 4 - 20 mg/dL    Alcohol 251 (H) <=10 mg/dL   Troponin I, High Sensitivity   Result Value Ref Range    Troponin I, High Sensitivity 8 0 - 20 ng/L   Magnesium   Result Value Ref Range    Magnesium 1.80 1.60 - 2.40 mg/dL   Coagulation Screen   Result Value Ref Range    Protime 11.3 9.8 - 12.4 seconds    INR 1.0 0.9 - 1.1    aPTT 29 26 - 36 seconds   Urinalysis with Reflex Culture and Microscopic   Result Value Ref Range    Color, Urine Yellow Light-Yellow, Yellow, Dark-Yellow    Appearance, Urine Clear Clear    Specific Gravity, Urine 1.021 1.005 - 1.035    pH, Urine 5.0 5.0, 5.5, 6.0, 6.5, 7.0, 7.5, 8.0    Protein, Urine NEGATIVE NEGATIVE, 10 (TRACE), 20 (TRACE) mg/dL    Glucose, Urine 500 (3+) (A) Normal mg/dL    Blood, Urine NEGATIVE NEGATIVE mg/dL    Ketones, Urine NEGATIVE NEGATIVE mg/dL    Bilirubin, Urine NEGATIVE NEGATIVE mg/dL    Urobilinogen, Urine Normal Normal mg/dL    Nitrite, Urine NEGATIVE NEGATIVE    Leukocyte Esterase, Urine NEGATIVE NEGATIVE   Lactate   Result Value Ref Range    Lactate 4.0 (HH) 0.4 - 2.0 mmol/L   Blood Gas Venous   Result Value Ref Range    POCT pH, Venous 7.23 (LL) 7.33 - 7.43 pH    POCT pCO2, Venous 49  41 - 51 mm Hg    POCT pO2, Venous 53 (H) 35 - 45 mm Hg    POCT SO2, Venous 81 (H) 45 - 75 %    POCT Oxy Hemoglobin, Venous 80.0 (H) 45.0 - 75.0 %    POCT Base Excess, Venous -7.2 (L) -2.0 - 3.0 mmol/L    POCT HCO3 Calculated, Venous 20.5 (L) 22.0 - 26.0 mmol/L    Patient Temperature 37.0 degrees Celsius    FiO2 28 %    Critical Called By THIAGO ARROYO RRT     Critical Called To DR CORONA     Critical Call Time 1038     Critical Read Back Y      CT abdomen pelvis w IV contrast  Result Date: 8/9/2025  Interpreted By:  Prasanth Carranza, STUDY: CT ABDOMEN PELVIS W IV CONTRAST;  8/9/2025 11:44 am   INDICATION: Signs/Symptoms:increasing lactate, sepsis w/u.   COMPARISON: None.   ACCESSION NUMBER(S): [id number]   ORDERING CLINICIAN: KEYUR CORONA   TECHNIQUE: CT of the abdomen and pelvis was performed.  Contiguous axial images were obtained at 3 mm slice thickness through the abdomen and pelvis. Coronal and sagittal reconstructions at 3 mm slice thickness were performed. 75 mL Omnipaque 350 administered intravenously without immediate complication.   FINDINGS: LOWER CHEST: Mild bibasilar atelectasis   ABDOMEN: Slightly motion degraded exam. There also appears to be artifact relating to devices external to the patient.   LIVER: Periportal edema   BILE DUCTS: Not dilated.   GALLBLADDER: No calcified stone or definite inflammation.   PANCREAS: Unremarkable   SPLEEN: Unremarkable   ADRENAL GLANDS: Slightly hyperplastic.   KIDNEYS AND URETERS: There is delayed enhancement of the right kidney, in the corticomedullary phase, compared to the left kidney which is in the nephrographic phase Otherwise unremarkable kidneys without hydronephrosis.   PELVIS:   BLADDER: Partially distended.   REPRODUCTIVE ORGANS: Mild prostatomegaly.   BOWEL: No findings of bowel obstruction or inflammation.    Unremarkable appendix.    Unremarkable mesentery.   VESSELS: Aortoiliac system is patent without aneurysm.  Major visceral branches are patent.Severe  atherosclerosis. IVC and major branches are grossly patent. Major portal venous branches are patent.   PERITONEUM AND RETROPERITONEUM: No free fluid or free air.    No abdominal or pelvic lymphadenopathy.   BONE AND ABDOMINAL WALL: No acute skeletal findings.  Severe lumbar degenerative changes with dextroscoliosis. Grade 1 anterolisthesis L4-5.       1.  Nonspecific periportal edema. Diagnostic considerations may include over-hydration, elevated right heart pressures, or hepatitis. 2. No additional acute findings identified on motion degraded exam. 3. Delayed opacification of the right kidney compared to the left suggesting arterial stenosis.   MACRO: None.   Signed by: Prasanth Carranza 8/9/2025 12:45 PM Dictation workstation:   [id number]    CT angio chest for pulmonary embolism  Result Date: 8/9/2025  Interpreted By:  Prasanth Carranza, STUDY: CT ANGIO CHEST FOR PULMONARY EMBOLISM;  8/9/2025 11:43 am   INDICATION: Signs/Symptoms:hypoxia.   COMPARISON: None.   ACCESSION NUMBER(S): [id number]   ORDERING CLINICIAN: KEYUR CORONA   TECHNIQUE: Helical data acquisition of the chest was obtained with  75 mL Omnipaque 350. Images were reformatted in axial, coronal, and sagittal planes.MIP reformatted images were also generated.   FINDINGS: LUNGS and AIRWAYS: Mild biapical interlobular septal thickening indicating interstitial edema. Mild dependent and bibasilar atelectasis. There is some respiratory motion which can obscure findings. Small subpleural scar in the lingula noted.   Central airways are patent. No bronchiectasis. No pleural effusion or pneumothorax.   MEDIASTINUM and MARIO, LOWER NECK AND AXILLA: The visualized thyroid gland is grossly unremarkable.   No thoracic lymphadenopathy by CT criteria.   Esophagus is not dilated.   HEART and VESSELS: Mild cardiomegaly.   No significant pericardial effusion.   No pulmonary embolism is identified on motion degraded images.   Severe coronary atherosclerosis.   Thoracic aorta is  severely atherosclerotic but otherwise patent . Mild dilatation of the ascending aorta up to 4.1 cm. Great vessels are tortuous but otherwise patent.   UPPER ABDOMEN: Separate report.   CHEST WALL and OSSEOUS STRUCTURES: Thoracic spine degenerative changes with mild dextroscoliosis.         1.  No pulmonary embolism identified on motion degraded exam. 2. Mild interstitial edema. 3. Mild dilatation of the ascending aorta up to 4.1 cm.     Signed by: Prasanth Carranza 8/9/2025 12:05 PM Dictation workstation:   [id number]    XR chest 1 view  Result Date: 8/9/2025  Interpreted By:  Jorje Thakkar, STUDY: XR CHEST 1 VIEW;  8/9/2025 9:48 am   INDICATION: Signs/Symptoms:concern for aspiration.   COMPARISON: 06/04/2025   ACCESSION NUMBER(S): [id number]   ORDERING CLINICIAN: KEYUR CORONA   FINDINGS: AP radiograph of the chest was provided.   DEVICES: None   CARDIOMEDIASTINAL SILHOUETTE: Cardiomediastinal silhouette is normal in size and configuration.No significant atherosclerotic calcification.   LUNGS: No focal consolidation. No pneumothorax. No pleural effusion. Bibasilar atelectasis   BONES: No acute osseous changes.       1.  No evidence of acute cardiopulmonary process.     Signed by: Jorje Thakkar 8/9/2025 10:26 AM Dictation workstation:   [id number]    CT head wo IV contrast  Result Date: 8/9/2025  Interpreted By:  Cheng Mcclure, STUDY: CT HEAD WO IV CONTRAST;  8/9/2025 9:20 am   INDICATION: Signs/Symptoms:ams.     COMPARISON: 08/09/2025   ACCESSION NUMBER(S): [id number]   ORDERING CLINICIAN: KEYUR CORONA   TECHNIQUE: Unenhanced images were obtained through the brain.   FINDINGS: There is atrophy resulting in prominence of the ventricles and sulci. There are mild areas of decreased attenuation within the white matter which are nonspecific but are commonly associated with small vessel ischemic disease. There is no mass effect or midline shift. No acute intracranial hemorrhage is identified. No extra-axial fluid  collections are seen. No intraparenchymal mass lesions are identified.  Bone windows demonstrate no evidence of an acute calvarial fracture.       No evidence of an acute intracranial process.   MACRO: None.   Signed by: Cheng Mcclure 8/9/2025 9:23 AM Dictation workstation:   [id number]    Assessment & Plan  Metabolic acidosis    Lactic acidosis    Falls      Altered mental status  Alcohol use (alc level 251)  Lactic acidosis  Nonspecific periportal edema on CT imaging  Hx falls  Hx CVA  B/L carotid artery stenosis  - Patient presented to the emergency department after EMS found him slumped over a table with a bottle of liza next to him, covered in feces/vomit  - Alcohol level 251 on arrival, lactate 3.7 (from VBG), patient given 1 L LR bolus, repeat lactate 4.0.  Additional liter of fluid given, repeat lactate pending  - Initial VBG shows pH of 7.25, pCO2 was normal, HCO3 is 18.9, and lactate 3.7.  Repeat VBG shows pH 7.23, HCO3 is 20.5. Additional VBG added on to trend pH  - Imaging results as above, no infectious process noted, no white count, patient afebrile  - Urinalysis negative for signs of infection  - Add on CIWA protocol  - PT/OT, SW/TCC - patient's wife would like information about resources to help her  with alcohol  - Add on BNP, RUQ US, consider addition of echo  - Fall precautions    First-degree AV block  - EKG, per ED physician, rate of 67 bpm, significantly prolonged TX interval constituting a first-degree AV block, otherwise a right bundle branch block and left anterior fascicular block are present, left axis deviation consistent with this, otherwise normal ST segments and T waves    Type 2 diabetes mellitus  - Per EMS, patient's glucose was >500, though 253 here in the ED. Patient is acidotic, though without an anion gap and no ketones present in urine  - Patient on 500 mg metformin 2 times daily and glimepiride at home  - SSI, hypoglycemic protocol, diabetic diet  - Hemoglobin A1c as of  2 months ago was 11.8%    DVT prophylaxis  -SCDs      Patient fully evaluated 08/09 ,thorough record review performed of previous labs and notes from prior encounters. Plan discussed with interdisciplinary team, consults placed, appreciate input. Will continue current and repeat labs in the AM.        Discharge planning discussed with patient and care team. Therapy evaluations ordered. Patient aware and agreeable to current plan, continue plan as above.     I spent a total of 75 minutes on the date of the service which included preparing to see the patient, face-to-face patient care, completing clinical documentation, obtaining and/or reviewing separately obtained history, performing a medically appropriate examination, counseling and educating the patient/family/caregiver, ordering medications, tests, or procedures, communicating with other HCPs (not separately reported), independently interpreting results (not separately reported), communicating results to the patient/family/caregiver, and care coordination (not separately reported).              Chanell Liriano           [1] No past medical history on file.  [2]   Past Surgical History:  Procedure Laterality Date    CT ANGIO NECK  12/14/2021    CT NECK ANGIO W AND WO IV CONTRAST 12/14/2021 [facility]    CT HEAD ANGIO W AND WO IV CONTRAST  12/14/2021    CT HEAD ANGIO W AND WO IV CONTRAST 12/14/2021 [facility]    MR HEAD ANGIO WO IV CONTRAST  12/13/2021    MR HEAD ANGIO WO IV CONTRAST 12/13/2021 [facility]ICAL [facility]    MR NECK ANGIO WO IV CONTRAST  12/13/2021    MR NECK ANGIO WO IV CONTRAST 12/13/2021 [facility] CL[facility]ICAL [facility]    OTHER SURGICAL HISTORY  12/23/2021    Carotid thromboendarterectomy   [3] No family history on file.  [4] aspirin, 81 mg, oral, Daily  atorvastatin, 40 mg, oral, Daily  clopidogrel, 75 mg, oral, Daily  docusate sodium, 100 mg, oral, Daily  DULoxetine, 60 mg, oral, Daily  folic acid, 1 mg, oral, Daily  gabapentin, 100 mg, oral,  TID  hydrALAZINE, 25 mg, oral, BID  insulin lispro, 0-5 Units, subcutaneous, TID AC  lisinopril, 20 mg, oral, Daily  multivitamin with minerals, 1 tablet, oral, Daily  polyethylene glycol, 17 g, oral, Daily  tamsulosin, 0.4 mg, oral, Nightly  [START ON 8/12/2025] thiamine, 100 mg, oral, Daily  thiamine, 100 mg, intravenous, Daily     [5] sodium chloride 0.9%, 75 mL/hr     [6] PRN medications: acetaminophen **OR** acetaminophen **OR** acetaminophen, dextrose, dextrose, diazePAM, glucagon, glucagon

## 2025-08-09 NOTE — PROGRESS NOTES
Pharmacy Medication History Review    Modesto Lorenz is a 74 y.o. male admitted for No Principal Problem: There is no principal problem currently on the Problem List. Please update the Problem List and refresh.. Pharmacy reviewed the patient's [id number] medications and allergies for accuracy.    The list below reflectives the updated PTA list. Please review each medication in order reconciliation for additional clarification and justification.  Prior to Admission medications   Medication Sig Start Date End Date Taking? Authorizing Provider   aspirin 81 mg chewable tablet Chew and swallow 1 tablet (81 mg) once daily.   Yes Historical Provider, MD   atorvastatin (Lipitor) 40 mg tablet Take 1 tablet (40 mg) by mouth once daily.   Yes Historical Provider, MD   clopidogrel (Plavix) 75 mg tablet Take 1 tablet (75 mg) by mouth once daily.   Yes Historical Provider, MD   docusate sodium (Colace) 100 mg capsule Take 1 capsule (100 mg) by mouth once daily.   Yes Historical Provider, MD   DULoxetine (Cymbalta) 60 mg DR capsule Take 1 capsule (60 mg) by mouth once daily.   Yes Historical Provider, MD   gabapentin (Neurontin) 100 mg capsule Take 1 capsule (100 mg) by mouth 3 times a day. 7/16/25  Yes Historical Provider, MD   glimepiride (Amaryl) 4 mg tablet Take 1 tablet (4 mg) by mouth once daily with breakfast. 6/6/25 8/9/25 Yes Wilfredo Burnham MD   hydrALAZINE (Apresoline) 25 mg tablet Take 1 tablet (25 mg) by mouth 2 times a day. 6/6/25 8/9/25 Yes Wilfredo Burnham MD   lisinopril 20 mg tablet Take 1 tablet (20 mg) by mouth once daily.   Yes Historical Provider, MD   metFORMIN (Glucophage) 500 mg tablet Take 1 tablet (500 mg) by mouth 2 times a day.   Yes Historical Provider, MD   tamsulosin (Flomax) 0.4 mg 24 hr capsule Take 1 capsule (0.4 mg) by mouth once daily at bedtime. 6/27/25  Yes Historical Provider, MD   fluticasone (Flonase) 50 mcg/actuation nasal spray Administer 2 sprays into each nostril once  daily.  Patient not taking: Reported on 8/9/2025    Historical Provider, MD   sertraline (Zoloft) 100 mg tablet Take 1 tablet (100 mg) by mouth once daily.  Patient not taking: Reported on 8/9/2025    Historical Provider, MD        The list below reflectives the updated allergy list. Please review each documented allergy for additional clarification and justification.  Allergies  Indicated as Unable to Assess by Rizwan Burgess on 8/9/2025 (Patient unable to communicate)   No Known Allergies         Below are additional concerns with the patient's PTA list.    Patient poor historian do to current condition. Medical and pharmacy records reviewed for accuracy.    Rimma Soriano

## 2025-08-09 NOTE — H&P
"History Of Present Illness  Modesto Lorenz is a 74 y.o. male with a past medical history of DMII, stroke, HLD, b/l carotid artery stenosis s/p carotid shunt who presents to the emergency department after patient's neighbor called EMS with concern about patient's wellbeing.  Per report, \"patient found slumped over at table with a bottle of liza nearby, covered in emesis and feces.  Patient is incoherent and alert and oriented x 1.\"  Patient was recently admitted from 6/4-6/6 with altered mental status, concern for stroke.  Patient was seen by neurology while hospitalized.  Given evidence of worsening intracranial stenosis in this patient, it was recommended to increase his atorvastatin to 80 mg p.o. daily.  Patient was to follow-up with Dr. Steven MORGAN with vascular neurology.  Patient was ultimately discharged home in stable condition.  Per ED documentation “Modesto's wife, Lisa, arrived ~2 hours later and was able to provide some additional history. She reports he quit drinking in 2004 but over the past month she has noticed that he smells like alcohol. She also described behavior change during this time, stating he has been \"nasty\" to her. Modesto spoke up at this point and was asking repeatedly what is wrong with him and how he got to the emergency department. On further questioning he reports buying a bottle of liza this morning because he has been upset with his home life. He denies being in pain. Review of systems limited by intoxication.”  At the time of my interview, patient is asleep, but easily arousable.  He is slurring his speech and speaking very loudly, appears intoxicated.  Wife presents to the bedside shortly after.  The patient tells me that he bought a bottle of liza on Friday night and had \"2 glasses.\"  Patient's wife states that she was at her son's, who is a quadriplegic, taking care of him and so she is unsure of how much liza he actually had.  She tells me that patient stopped cold turkey drinking " "alcohol in 2004 after he forgot to pick her up from her hysterectomy.  She states that over the past 1 year, she has smelled alcohol, but dismissed it because she did not believe he had started drinking again.  She states that the smell of alcohol got stronger over the past couple of months, but still had never seen any alcohol in the house.  She states that this morning, their neighbor thought he heard Modesto calling for him inside the house, but when the neighbor went to the front door, nobody answered.  He then found Modesto slumped over at the table with emesis all over him with a bottle of liza beside him.  EMS was subsequently called.  Patient's wife expresses a lot of frustration and concern over this issue.  She states that patient has also been falling a lot recently, with his most recent fall last week.  Patient states that he has pain in his right arm, but denies any pain/injury anywhere else.  Patient's wife is interested in having patient seek help, though she states patient is \"stubborn, holds grudges.\"  She denies any other drug use or tobacco use in the patient.    ED course: On arrival, patient's BP 89/61, heart rate 70, respirations 22, afebrile, saturating 90%.  Now saturating 100% on 2 L NC.  Labs and imaging performed, revealing glucose 253, sodium 135, BUN 26, LFTs WNL.  Lipase WNL.  Magnesium WNL.  Initial troponin negative.  Lactate 4.0.  Fluids given.  No white count.  Hemoglobin slightly low at 12.1.  Platelets WNL.  Initial VBG shows pH of 7.25, pCO2 was normal, HCO3 is 18.9, and lactate 3.7.  Repeat VBG shows pH 7.23, HCO3 is 20.5.  Urinalysis negative for signs of infection.  CT head shows no evidence of acute intracranial process.  Chest x-ray shows no evidence of acute cardiopulmonary process.  CT angio chest for PE shows no PE.  Mild interstitial edema.  Mild dilatation of the ascending aorta up to 4.1 cm.  CT abdomen/pelvis shows nonspecific periportal edema.  Diagnostic considerations " may include overhydration, elevated right heart pressures, or hepatitis.  Delayed opacification of the right kidney compared to the left suggesting arterial stenosis.  EKG, per ED physician, rate of 67 bpm, significantly prolonged OK interval constituting a first-degree AV block, otherwise a right bundle branch block and left anterior fascicular block are present, left axis deviation consistent with this, otherwise normal ST segments and T waves.  Patient given 2 L LR boluses in the ED, lactate ordered and pending.  Patient is to be admitted inpatient under Dr. Burnham.     Past Medical History  Medical History[1]    Surgical History  Surgical History[2]     Social History  He reports that he quit smoking about 4 years ago. His smoking use included cigarettes. He has quit using smokeless tobacco. No history on file for alcohol use and drug use.    Family History  Family History[3]     Allergies  Patient has no known allergies.    Review of Systems  Limited due to intoxication.    Physical Exam  Constitutional:       Appearance: He is not ill-appearing or toxic-appearing.      Comments: Slurred speech, intoxication.   HENT:      Head: Normocephalic and atraumatic.      Nose: Nose normal.      Mouth/Throat:      Mouth: Mucous membranes are moist.      Pharynx: Oropharynx is clear.     Eyes:      Extraocular Movements: Extraocular movements intact.      Conjunctiva/sclera: Conjunctivae normal.      Pupils: Pupils are equal, round, and reactive to light.       Cardiovascular:      Rate and Rhythm: Normal rate and regular rhythm.   Pulmonary:      Effort: Pulmonary effort is normal.      Breath sounds: Normal breath sounds. No wheezing or rales.   Abdominal:      General: Abdomen is flat. There is distension.      Palpations: Abdomen is soft.      Tenderness: There is no abdominal tenderness.     Musculoskeletal:         General: Normal range of motion.      Cervical back: Normal range of motion.      Right lower leg: No  "edema.      Left lower leg: No edema.     Skin:     General: Skin is warm and dry.      Findings: Bruising present.     Neurological:      Mental Status: He is disoriented.          Last Recorded Vitals  Blood pressure 126/58, pulse 83, temperature 36.4 °C (97.5 °F), temperature source Temporal, resp. rate (!) 22, height 1.753 m (5' 9\"), weight 82.6 kg (182 lb 1.6 oz), SpO2 100%.    Relevant Results    Scheduled medications  Scheduled Medications[4]  Continuous medications  Continuous Medications[5]  PRN medications  PRN Medications[6]    Results for orders placed or performed during the hospital encounter of 08/09/25 (from the past 24 hours)   BLOOD GAS VENOUS FULL PANEL   Result Value Ref Range    POCT pH, Venous 7.25 (LL) 7.33 - 7.43 pH    POCT pCO2, Venous 43 41 - 51 mm Hg    POCT pO2, Venous 53 (H) 35 - 45 mm Hg    POCT SO2, Venous 82 (H) 45 - 75 %    POCT Oxy Hemoglobin, Venous 80.4 (H) 45.0 - 75.0 %    POCT Hematocrit Calculated, Venous 38.0 (L) 41.0 - 52.0 %    POCT Sodium, Venous 135 (L) 136 - 145 mmol/L    POCT Potassium, Venous 3.7 3.5 - 5.3 mmol/L    POCT Chloride, Venous 104 98 - 107 mmol/L    POCT Ionized Calicum, Venous 1.07 (L) 1.10 - 1.33 mmol/L    POCT Glucose, Venous 273 (H) 74 - 99 mg/dL    POCT Lactate, Venous 3.7 (H) 0.4 - 2.0 mmol/L    POCT Base Excess, Venous -8.0 (L) -2.0 - 3.0 mmol/L    POCT HCO3 Calculated, Venous 18.9 (L) 22.0 - 26.0 mmol/L    POCT Hemoglobin, Venous 12.6 (L) 13.5 - 17.5 g/dL    POCT Anion Gap, Venous 16.0 10.0 - 25.0 mmol/L    Patient Temperature 37.0 degrees Celsius    FiO2 21 %    Critical Called By THIAGO ARROYO RRT     Critical Called To DR CORONA     Critical Call Time 859     Critical Read Back Y    CBC   Result Value Ref Range    WBC 7.8 4.4 - 11.3 x10*3/uL    nRBC 0.0 0.0 - 0.0 /100 WBCs    RBC 3.91 (L) 4.50 - 5.90 x10*6/uL    Hemoglobin 12.1 (L) 13.5 - 17.5 g/dL    Hematocrit 38.7 (L) 41.0 - 52.0 %    MCV 99 80 - 100 fL    MCH 30.9 26.0 - 34.0 pg    MCHC 31.3 (L) " 32.0 - 36.0 g/dL    RDW 12.8 11.5 - 14.5 %    Platelets 228 150 - 450 x10*3/uL   Comprehensive metabolic panel   Result Value Ref Range    Glucose 253 (H) 74 - 99 mg/dL    Sodium 135 (L) 136 - 145 mmol/L    Potassium 3.7 3.5 - 5.3 mmol/L    Chloride 103 98 - 107 mmol/L    Bicarbonate 21 21 - 32 mmol/L    Anion Gap 15 10 - 20 mmol/L    Urea Nitrogen 26 (H) 6 - 23 mg/dL    Creatinine 1.24 0.50 - 1.30 mg/dL    eGFR 61 >60 mL/min/1.73m*2    Calcium 8.4 (L) 8.6 - 10.3 mg/dL    Albumin 4.0 3.4 - 5.0 g/dL    Alkaline Phosphatase 55 33 - 136 U/L    Total Protein 6.3 (L) 6.4 - 8.2 g/dL    AST 17 9 - 39 U/L    Bilirubin, Total 0.2 0.0 - 1.2 mg/dL    ALT 19 10 - 52 U/L   Lipase   Result Value Ref Range    Lipase 12 9 - 82 U/L   Acute Toxicology Panel, Blood   Result Value Ref Range    Acetaminophen <10.0 10.0 - 30.0 ug/mL    Salicylate  <3 4 - 20 mg/dL    Alcohol 251 (H) <=10 mg/dL   Troponin I, High Sensitivity   Result Value Ref Range    Troponin I, High Sensitivity 8 0 - 20 ng/L   Magnesium   Result Value Ref Range    Magnesium 1.80 1.60 - 2.40 mg/dL   Coagulation Screen   Result Value Ref Range    Protime 11.3 9.8 - 12.4 seconds    INR 1.0 0.9 - 1.1    aPTT 29 26 - 36 seconds   Urinalysis with Reflex Culture and Microscopic   Result Value Ref Range    Color, Urine Yellow Light-Yellow, Yellow, Dark-Yellow    Appearance, Urine Clear Clear    Specific Gravity, Urine 1.021 1.005 - 1.035    pH, Urine 5.0 5.0, 5.5, 6.0, 6.5, 7.0, 7.5, 8.0    Protein, Urine NEGATIVE NEGATIVE, 10 (TRACE), 20 (TRACE) mg/dL    Glucose, Urine 500 (3+) (A) Normal mg/dL    Blood, Urine NEGATIVE NEGATIVE mg/dL    Ketones, Urine NEGATIVE NEGATIVE mg/dL    Bilirubin, Urine NEGATIVE NEGATIVE mg/dL    Urobilinogen, Urine Normal Normal mg/dL    Nitrite, Urine NEGATIVE NEGATIVE    Leukocyte Esterase, Urine NEGATIVE NEGATIVE   Lactate   Result Value Ref Range    Lactate 4.0 (HH) 0.4 - 2.0 mmol/L   Blood Gas Venous   Result Value Ref Range    POCT pH, Venous  7.23 (LL) 7.33 - 7.43 pH    POCT pCO2, Venous 49 41 - 51 mm Hg    POCT pO2, Venous 53 (H) 35 - 45 mm Hg    POCT SO2, Venous 81 (H) 45 - 75 %    POCT Oxy Hemoglobin, Venous 80.0 (H) 45.0 - 75.0 %    POCT Base Excess, Venous -7.2 (L) -2.0 - 3.0 mmol/L    POCT HCO3 Calculated, Venous 20.5 (L) 22.0 - 26.0 mmol/L    Patient Temperature 37.0 degrees Celsius    FiO2 28 %    Critical Called By THIAGO ARROYO RRT     Critical Called To DR CORONA     Critical Call Time 1038     Critical Read Back Y      CT abdomen pelvis w IV contrast  Result Date: 8/9/2025  Interpreted By:  Prasanth Carranza, STUDY: CT ABDOMEN PELVIS W IV CONTRAST;  8/9/2025 11:44 am   INDICATION: Signs/Symptoms:increasing lactate, sepsis w/u.   COMPARISON: None.   ACCESSION NUMBER(S): [id number]   ORDERING CLINICIAN: KEYUR CORONA   TECHNIQUE: CT of the abdomen and pelvis was performed.  Contiguous axial images were obtained at 3 mm slice thickness through the abdomen and pelvis. Coronal and sagittal reconstructions at 3 mm slice thickness were performed. 75 mL Omnipaque 350 administered intravenously without immediate complication.   FINDINGS: LOWER CHEST: Mild bibasilar atelectasis   ABDOMEN: Slightly motion degraded exam. There also appears to be artifact relating to devices external to the patient.   LIVER: Periportal edema   BILE DUCTS: Not dilated.   GALLBLADDER: No calcified stone or definite inflammation.   PANCREAS: Unremarkable   SPLEEN: Unremarkable   ADRENAL GLANDS: Slightly hyperplastic.   KIDNEYS AND URETERS: There is delayed enhancement of the right kidney, in the corticomedullary phase, compared to the left kidney which is in the nephrographic phase Otherwise unremarkable kidneys without hydronephrosis.   PELVIS:   BLADDER: Partially distended.   REPRODUCTIVE ORGANS: Mild prostatomegaly.   BOWEL: No findings of bowel obstruction or inflammation.    Unremarkable appendix.    Unremarkable mesentery.   VESSELS: Aortoiliac system is patent without  aneurysm.  Major visceral branches are patent.Severe atherosclerosis. IVC and major branches are grossly patent. Major portal venous branches are patent.   PERITONEUM AND RETROPERITONEUM: No free fluid or free air.    No abdominal or pelvic lymphadenopathy.   BONE AND ABDOMINAL WALL: No acute skeletal findings.  Severe lumbar degenerative changes with dextroscoliosis. Grade 1 anterolisthesis L4-5.       1.  Nonspecific periportal edema. Diagnostic considerations may include over-hydration, elevated right heart pressures, or hepatitis. 2. No additional acute findings identified on motion degraded exam. 3. Delayed opacification of the right kidney compared to the left suggesting arterial stenosis.   MACRO: None.   Signed by: Prasanth Carranza 8/9/2025 12:45 PM Dictation workstation:   [id number]    CT angio chest for pulmonary embolism  Result Date: 8/9/2025  Interpreted By:  Prasanth Carranza, STUDY: CT ANGIO CHEST FOR PULMONARY EMBOLISM;  8/9/2025 11:43 am   INDICATION: Signs/Symptoms:hypoxia.   COMPARISON: None.   ACCESSION NUMBER(S): [id number]   ORDERING CLINICIAN: KEYUR CORONA   TECHNIQUE: Helical data acquisition of the chest was obtained with  75 mL Omnipaque 350. Images were reformatted in axial, coronal, and sagittal planes.MIP reformatted images were also generated.   FINDINGS: LUNGS and AIRWAYS: Mild biapical interlobular septal thickening indicating interstitial edema. Mild dependent and bibasilar atelectasis. There is some respiratory motion which can obscure findings. Small subpleural scar in the lingula noted.   Central airways are patent. No bronchiectasis. No pleural effusion or pneumothorax.   MEDIASTINUM and MARIO, LOWER NECK AND AXILLA: The visualized thyroid gland is grossly unremarkable.   No thoracic lymphadenopathy by CT criteria.   Esophagus is not dilated.   HEART and VESSELS: Mild cardiomegaly.   No significant pericardial effusion.   No pulmonary embolism is identified on motion degraded images.    Severe coronary atherosclerosis.   Thoracic aorta is severely atherosclerotic but otherwise patent . Mild dilatation of the ascending aorta up to 4.1 cm. Great vessels are tortuous but otherwise patent.   UPPER ABDOMEN: Separate report.   CHEST WALL and OSSEOUS STRUCTURES: Thoracic spine degenerative changes with mild dextroscoliosis.         1.  No pulmonary embolism identified on motion degraded exam. 2. Mild interstitial edema. 3. Mild dilatation of the ascending aorta up to 4.1 cm.     Signed by: Prasanth Carranza 8/9/2025 12:05 PM Dictation workstation:   [id number]    XR chest 1 view  Result Date: 8/9/2025  Interpreted By:  Jorje Thakkar, STUDY: XR CHEST 1 VIEW;  8/9/2025 9:48 am   INDICATION: Signs/Symptoms:concern for aspiration.   COMPARISON: 06/04/2025   ACCESSION NUMBER(S): [id number]   ORDERING CLINICIAN: KEYUR CORONA   FINDINGS: AP radiograph of the chest was provided.   DEVICES: None   CARDIOMEDIASTINAL SILHOUETTE: Cardiomediastinal silhouette is normal in size and configuration.No significant atherosclerotic calcification.   LUNGS: No focal consolidation. No pneumothorax. No pleural effusion. Bibasilar atelectasis   BONES: No acute osseous changes.       1.  No evidence of acute cardiopulmonary process.     Signed by: Jorje Thakkar 8/9/2025 10:26 AM Dictation workstation:   [id number]    CT head wo IV contrast  Result Date: 8/9/2025  Interpreted By:  Cheng Mcclure, STUDY: CT HEAD WO IV CONTRAST;  8/9/2025 9:20 am   INDICATION: Signs/Symptoms:ams.     COMPARISON: 08/09/2025   ACCESSION NUMBER(S): [id number]   ORDERING CLINICIAN: KEYUR CORONA   TECHNIQUE: Unenhanced images were obtained through the brain.   FINDINGS: There is atrophy resulting in prominence of the ventricles and sulci. There are mild areas of decreased attenuation within the white matter which are nonspecific but are commonly associated with small vessel ischemic disease. There is no mass effect or midline shift. No acute  intracranial hemorrhage is identified. No extra-axial fluid collections are seen. No intraparenchymal mass lesions are identified.  Bone windows demonstrate no evidence of an acute calvarial fracture.       No evidence of an acute intracranial process.   MACRO: None.   Signed by: Cheng Mcclure 8/9/2025 9:23 AM Dictation workstation:   [id number]    Assessment & Plan  Metabolic acidosis    Lactic acidosis    Falls      Altered mental status  Alcohol use (alc level 251)  Lactic acidosis  Nonspecific periportal edema on CT imaging  Hx falls  Hx CVA  B/L carotid artery stenosis  - Patient presented to the emergency department after EMS found him slumped over a table with a bottle of liza next to him, covered in feces/vomit  - Alcohol level 251 on arrival, lactate 3.7 (from VBG), patient given 1 L LR bolus, repeat lactate 4.0.  Additional liter of fluid given, repeat lactate pending  - Initial VBG shows pH of 7.25, pCO2 was normal, HCO3 is 18.9, and lactate 3.7.  Repeat VBG shows pH 7.23, HCO3 is 20.5. Additional VBG added on to trend pH  - Imaging results as above, no infectious process noted, no white count, patient afebrile  - Urinalysis negative for signs of infection  - Add on CIWA protocol  - PT/OT, SW/TCC - patient's wife would like information about resources to help her  with alcohol  - Add on BNP, RUQ US, consider addition of echo  - Fall precautions    First-degree AV block  - EKG, per ED physician, rate of 67 bpm, significantly prolonged PA interval constituting a first-degree AV block, otherwise a right bundle branch block and left anterior fascicular block are present, left axis deviation consistent with this, otherwise normal ST segments and T waves    Type 2 diabetes mellitus  - Per EMS, patient's glucose was >500, though 253 here in the ED. Patient is acidotic, though without an anion gap and no ketones present in urine  - Patient on 500 mg metformin 2 times daily and glimepiride at home  - SSI,  hypoglycemic protocol, diabetic diet  - Hemoglobin A1c as of 2 months ago was 11.8%    DVT prophylaxis  -SCDs    I spent 70 minutes in the professional and overall care of this patient.      Nandini Reynaga PA-C         [1] No past medical history on file.  [2]   Past Surgical History:  Procedure Laterality Date    CT ANGIO NECK  12/14/2021    CT NECK ANGIO W AND WO IV CONTRAST 12/14/2021 [facility]    CT HEAD ANGIO W AND WO IV CONTRAST  12/14/2021    CT HEAD ANGIO W AND WO IV CONTRAST 12/14/2021 [facility] CLINICAL [facility]GAC[facility]    MR HEAD ANGIO WO IV CONTRAST  12/13/2021    MR HEAD ANGIO WO IV CONTRAST 12/13/2021 [facility] CLINICAL [facility]GAC[facility]    MR NECK ANGIO WO IV CONTRAST  12/13/2021    MR NECK ANGIO WO IV CONTRAST [facility]    OTHER SURGICAL HISTORY  12/23/2021    Carotid thromboendarterectomy   [3] No family history on file.  [4] aspirin, 81 mg, oral, Daily  atorvastatin, 40 mg, oral, Daily  clopidogrel, 75 mg, oral, Daily  docusate sodium, 100 mg, oral, Daily  DULoxetine, 60 mg, oral, Daily  folic acid, 1 mg, oral, Daily  gabapentin, 100 mg, oral, TID  hydrALAZINE, 25 mg, oral, BID  insulin lispro, 0-5 Units, subcutaneous, TID AC  lisinopril, 20 mg, oral, Daily  multivitamin with minerals, 1 tablet, oral, Daily  polyethylene glycol, 17 g, oral, Daily  tamsulosin, 0.4 mg, oral, Nightly  [START ON 8/12/2025] thiamine, 100 mg, oral, Daily  thiamine, 100 mg, intravenous, Daily  [5] sodium chloride 0.9%, 75 mL/hr  [6] PRN medications: acetaminophen **OR** acetaminophen **OR** acetaminophen, dextrose, dextrose, diazePAM, glucagon, glucagon

## 2025-08-09 NOTE — CARE PLAN
The patient's goals for the shift include      The clinical goals for the shift include maintain safety    Over the shift, the patient did not make progress toward the following goals. Barriers to progression include confusion. Recommendations to address these barriers include re-direction.

## 2025-08-10 VITALS
RESPIRATION RATE: 20 BRPM | HEART RATE: 74 BPM | WEIGHT: 182.1 LBS | DIASTOLIC BLOOD PRESSURE: 73 MMHG | SYSTOLIC BLOOD PRESSURE: 133 MMHG | OXYGEN SATURATION: 93 % | TEMPERATURE: 97.2 F | HEIGHT: 69 IN | BODY MASS INDEX: 26.97 KG/M2

## 2025-08-10 LAB
ANION GAP SERPL CALC-SCNC: 10 MMOL/L (ref 10–20)
BUN SERPL-MCNC: 14 MG/DL (ref 6–23)
CALCIUM SERPL-MCNC: 8.4 MG/DL (ref 8.6–10.3)
CHLORIDE SERPL-SCNC: 106 MMOL/L (ref 98–107)
CO2 SERPL-SCNC: 27 MMOL/L (ref 21–32)
CREAT SERPL-MCNC: 0.76 MG/DL (ref 0.5–1.3)
EGFRCR SERPLBLD CKD-EPI 2021: >90 ML/MIN/1.73M*2
ERYTHROCYTE [DISTWIDTH] IN BLOOD BY AUTOMATED COUNT: 12.8 % (ref 11.5–14.5)
EST. AVERAGE GLUCOSE BLD GHB EST-MCNC: 171 MG/DL
GLUCOSE BLD MANUAL STRIP-MCNC: 106 MG/DL (ref 74–99)
GLUCOSE BLD MANUAL STRIP-MCNC: 120 MG/DL (ref 74–99)
GLUCOSE BLD MANUAL STRIP-MCNC: 144 MG/DL (ref 74–99)
GLUCOSE BLD MANUAL STRIP-MCNC: 186 MG/DL (ref 74–99)
GLUCOSE SERPL-MCNC: 104 MG/DL (ref 74–99)
HBA1C MFR BLD: 7.6 % (ref ?–5.7)
HCT VFR BLD AUTO: 37.3 % (ref 41–52)
HGB BLD-MCNC: 11.7 G/DL (ref 13.5–17.5)
MAGNESIUM SERPL-MCNC: 1.74 MG/DL (ref 1.6–2.4)
MCH RBC QN AUTO: 30.8 PG (ref 26–34)
MCHC RBC AUTO-ENTMCNC: 31.4 G/DL (ref 32–36)
MCV RBC AUTO: 98 FL (ref 80–100)
NRBC BLD-RTO: 0 /100 WBCS (ref 0–0)
PLATELET # BLD AUTO: 201 X10*3/UL (ref 150–450)
POTASSIUM SERPL-SCNC: 3.9 MMOL/L (ref 3.5–5.3)
RBC # BLD AUTO: 3.8 X10*6/UL (ref 4.5–5.9)
SODIUM SERPL-SCNC: 139 MMOL/L (ref 136–145)
WBC # BLD AUTO: 4.9 X10*3/UL (ref 4.4–11.3)

## 2025-08-10 PROCEDURE — 82947 ASSAY GLUCOSE BLOOD QUANT: CPT

## 2025-08-10 PROCEDURE — 85027 COMPLETE CBC AUTOMATED: CPT

## 2025-08-10 PROCEDURE — 80048 BASIC METABOLIC PNL TOTAL CA: CPT

## 2025-08-10 PROCEDURE — 83735 ASSAY OF MAGNESIUM: CPT | Performed by: INTERNAL MEDICINE

## 2025-08-10 PROCEDURE — 2500000002 HC RX 250 W HCPCS SELF ADMINISTERED DRUGS (ALT 637 FOR MEDICARE OP, ALT 636 FOR OP/ED)

## 2025-08-10 PROCEDURE — 36415 COLL VENOUS BLD VENIPUNCTURE: CPT

## 2025-08-10 PROCEDURE — 2500000001 HC RX 250 WO HCPCS SELF ADMINISTERED DRUGS (ALT 637 FOR MEDICARE OP)

## 2025-08-10 PROCEDURE — 1200000002 HC GENERAL ROOM WITH TELEMETRY DAILY

## 2025-08-10 PROCEDURE — 83036 HEMOGLOBIN GLYCOSYLATED A1C: CPT | Mod: PARLAB | Performed by: INTERNAL MEDICINE

## 2025-08-10 PROCEDURE — 2500000004 HC RX 250 GENERAL PHARMACY W/ HCPCS (ALT 636 FOR OP/ED)

## 2025-08-10 PROCEDURE — 2500000004 HC RX 250 GENERAL PHARMACY W/ HCPCS (ALT 636 FOR OP/ED): Performed by: INTERNAL MEDICINE

## 2025-08-10 RX ORDER — MAGNESIUM SULFATE HEPTAHYDRATE 40 MG/ML
2 INJECTION, SOLUTION INTRAVENOUS ONCE
Status: COMPLETED | OUTPATIENT
Start: 2025-08-10 | End: 2025-08-10

## 2025-08-10 RX ADMIN — GABAPENTIN 100 MG: 100 CAPSULE ORAL at 16:04

## 2025-08-10 RX ADMIN — MAGNESIUM SULFATE HEPTAHYDRATE 2 G: 40 INJECTION, SOLUTION INTRAVENOUS at 21:27

## 2025-08-10 RX ADMIN — GABAPENTIN 100 MG: 100 CAPSULE ORAL at 19:38

## 2025-08-10 RX ADMIN — Medication 1 TABLET: at 08:17

## 2025-08-10 RX ADMIN — CLOPIDOGREL BISULFATE 75 MG: 75 TABLET, FILM COATED ORAL at 08:18

## 2025-08-10 RX ADMIN — GABAPENTIN 100 MG: 100 CAPSULE ORAL at 08:18

## 2025-08-10 RX ADMIN — ATORVASTATIN CALCIUM 40 MG: 40 TABLET, FILM COATED ORAL at 08:17

## 2025-08-10 RX ADMIN — ASPIRIN 81 MG CHEWABLE TABLET 81 MG: 81 TABLET CHEWABLE at 08:17

## 2025-08-10 RX ADMIN — HYDRALAZINE HYDROCHLORIDE 25 MG: 25 TABLET ORAL at 08:17

## 2025-08-10 RX ADMIN — INSULIN LISPRO 1 UNITS: 100 INJECTION, SOLUTION INTRAVENOUS; SUBCUTANEOUS at 11:53

## 2025-08-10 RX ADMIN — FOLIC ACID 1 MG: 1 TABLET ORAL at 08:18

## 2025-08-10 RX ADMIN — SODIUM CHLORIDE 75 ML/HR: 0.9 INJECTION, SOLUTION INTRAVENOUS at 05:00

## 2025-08-10 RX ADMIN — THIAMINE HYDROCHLORIDE 100 MG: 100 INJECTION, SOLUTION INTRAMUSCULAR; INTRAVENOUS at 08:18

## 2025-08-10 RX ADMIN — HYDRALAZINE HYDROCHLORIDE 25 MG: 25 TABLET ORAL at 19:38

## 2025-08-10 RX ADMIN — DULOXETINE 60 MG: 30 CAPSULE, DELAYED RELEASE ORAL at 08:17

## 2025-08-10 RX ADMIN — TAMSULOSIN HYDROCHLORIDE 0.4 MG: 0.4 CAPSULE ORAL at 19:38

## 2025-08-10 RX ADMIN — LISINOPRIL 20 MG: 20 TABLET ORAL at 08:18

## 2025-08-10 ASSESSMENT — LIFESTYLE VARIABLES
VISUAL DISTURBANCES: NOT PRESENT
AUDITORY DISTURBANCES: NOT PRESENT
HEADACHE, FULLNESS IN HEAD: NOT PRESENT
TREMOR: NO TREMOR
ORIENTATION AND CLOUDING OF SENSORIUM: ORIENTED AND CAN DO SERIAL ADDITIONS
ORIENTATION AND CLOUDING OF SENSORIUM: ORIENTED AND CAN DO SERIAL ADDITIONS
AUDITORY DISTURBANCES: NOT PRESENT
AGITATION: NORMAL ACTIVITY
AUDITORY DISTURBANCES: NOT PRESENT
AGITATION: NORMAL ACTIVITY
ANXIETY: NO ANXIETY, AT EASE
PAROXYSMAL SWEATS: NO SWEAT VISIBLE
HEADACHE, FULLNESS IN HEAD: NOT PRESENT
ANXIETY: NO ANXIETY, AT EASE
ORIENTATION AND CLOUDING OF SENSORIUM: ORIENTED AND CAN DO SERIAL ADDITIONS
ORIENTATION AND CLOUDING OF SENSORIUM: ORIENTED AND CAN DO SERIAL ADDITIONS
AUDITORY DISTURBANCES: NOT PRESENT
ANXIETY: NO ANXIETY, AT EASE
NAUSEA AND VOMITING: NO NAUSEA AND NO VOMITING
PAROXYSMAL SWEATS: NO SWEAT VISIBLE
ANXIETY: NO ANXIETY, AT EASE
TOTAL SCORE: 0
AUDITORY DISTURBANCES: NOT PRESENT
HEADACHE, FULLNESS IN HEAD: NOT PRESENT
AGITATION: NORMAL ACTIVITY
VISUAL DISTURBANCES: NOT PRESENT
NAUSEA AND VOMITING: NO NAUSEA AND NO VOMITING
TREMOR: NO TREMOR
ORIENTATION AND CLOUDING OF SENSORIUM: ORIENTED AND CAN DO SERIAL ADDITIONS
HEADACHE, FULLNESS IN HEAD: NOT PRESENT
PAROXYSMAL SWEATS: NO SWEAT VISIBLE
HEADACHE, FULLNESS IN HEAD: NOT PRESENT
VISUAL DISTURBANCES: NOT PRESENT
VISUAL DISTURBANCES: NOT PRESENT
TREMOR: NO TREMOR
NAUSEA AND VOMITING: NO NAUSEA AND NO VOMITING
AUDITORY DISTURBANCES: NOT PRESENT
ANXIETY: NO ANXIETY, AT EASE
PAROXYSMAL SWEATS: NO SWEAT VISIBLE
PAROXYSMAL SWEATS: NO SWEAT VISIBLE
TREMOR: NO TREMOR
AGITATION: NORMAL ACTIVITY
PAROXYSMAL SWEATS: NO SWEAT VISIBLE
AUDITORY DISTURBANCES: NOT PRESENT
ANXIETY: NO ANXIETY, AT EASE
TREMOR: NO TREMOR
TREMOR: NO TREMOR
VISUAL DISTURBANCES: NOT PRESENT
TOTAL SCORE: 0
TREMOR: NO TREMOR
NAUSEA AND VOMITING: NO NAUSEA AND NO VOMITING
HEADACHE, FULLNESS IN HEAD: NOT PRESENT
AGITATION: NORMAL ACTIVITY
VISUAL DISTURBANCES: NOT PRESENT
AUDITORY DISTURBANCES: NOT PRESENT
ORIENTATION AND CLOUDING OF SENSORIUM: ORIENTED AND CAN DO SERIAL ADDITIONS
HEADACHE, FULLNESS IN HEAD: NOT PRESENT
TREMOR: NO TREMOR
TOTAL SCORE: 0
VISUAL DISTURBANCES: NOT PRESENT
ORIENTATION AND CLOUDING OF SENSORIUM: ORIENTED AND CAN DO SERIAL ADDITIONS
AGITATION: NORMAL ACTIVITY
PAROXYSMAL SWEATS: NO SWEAT VISIBLE
ANXIETY: NO ANXIETY, AT EASE
VISUAL DISTURBANCES: NOT PRESENT
AUDITORY DISTURBANCES: NOT PRESENT
PAROXYSMAL SWEATS: NO SWEAT VISIBLE
NAUSEA AND VOMITING: NO NAUSEA AND NO VOMITING
AGITATION: NORMAL ACTIVITY
NAUSEA AND VOMITING: NO NAUSEA AND NO VOMITING
TREMOR: NO TREMOR
HEADACHE, FULLNESS IN HEAD: NOT PRESENT
TOTAL SCORE: 0
ANXIETY: NO ANXIETY, AT EASE
VISUAL DISTURBANCES: NOT PRESENT
AGITATION: NORMAL ACTIVITY
HEADACHE, FULLNESS IN HEAD: NOT PRESENT
TREMOR: NO TREMOR
HEADACHE, FULLNESS IN HEAD: NOT PRESENT
NAUSEA AND VOMITING: NO NAUSEA AND NO VOMITING
NAUSEA AND VOMITING: NO NAUSEA AND NO VOMITING
AGITATION: NORMAL ACTIVITY
PAROXYSMAL SWEATS: NO SWEAT VISIBLE
ORIENTATION AND CLOUDING OF SENSORIUM: ORIENTED AND CAN DO SERIAL ADDITIONS
PAROXYSMAL SWEATS: NO SWEAT VISIBLE
TREMOR: NO TREMOR
ANXIETY: NO ANXIETY, AT EASE
AUDITORY DISTURBANCES: NOT PRESENT
VISUAL DISTURBANCES: NOT PRESENT
NAUSEA AND VOMITING: NO NAUSEA AND NO VOMITING
VISUAL DISTURBANCES: NOT PRESENT
AGITATION: NORMAL ACTIVITY
TOTAL SCORE: 0
AGITATION: NORMAL ACTIVITY
ANXIETY: NO ANXIETY, AT EASE
ORIENTATION AND CLOUDING OF SENSORIUM: ORIENTED AND CAN DO SERIAL ADDITIONS
ANXIETY: NO ANXIETY, AT EASE
NAUSEA AND VOMITING: NO NAUSEA AND NO VOMITING
NAUSEA AND VOMITING: NO NAUSEA AND NO VOMITING
HEADACHE, FULLNESS IN HEAD: NOT PRESENT
PAROXYSMAL SWEATS: NO SWEAT VISIBLE
AUDITORY DISTURBANCES: NOT PRESENT

## 2025-08-10 ASSESSMENT — COGNITIVE AND FUNCTIONAL STATUS - GENERAL
CLIMB 3 TO 5 STEPS WITH RAILING: A LITTLE
DRESSING REGULAR LOWER BODY CLOTHING: A LITTLE
DRESSING REGULAR UPPER BODY CLOTHING: A LITTLE
DAILY ACTIVITIY SCORE: 22
MOBILITY SCORE: 23

## 2025-08-10 ASSESSMENT — PAIN SCALES - GENERAL
PAINLEVEL_OUTOF10: 0 - NO PAIN

## 2025-08-10 ASSESSMENT — PAIN - FUNCTIONAL ASSESSMENT
PAIN_FUNCTIONAL_ASSESSMENT: 0-10
PAIN_FUNCTIONAL_ASSESSMENT: 0-10

## 2025-08-10 NOTE — CARE PLAN
The patient's goals for the shift include      The clinical goals for the shift include maintain safety      Problem: Pain - Adult  Goal: Verbalizes/displays adequate comfort level or baseline comfort level  Outcome: Progressing     Problem: Safety - Adult  Goal: Free from fall injury  Outcome: Progressing     Problem: Discharge Planning  Goal: Discharge to home or other facility with appropriate resources  Outcome: Progressing     Problem: Chronic Conditions and Co-morbidities  Goal: Patient's chronic conditions and co-morbidity symptoms are monitored and maintained or improved  Outcome: Progressing     Problem: Nutrition  Goal: Nutrient intake appropriate for maintaining nutritional needs  Outcome: Progressing     Problem: Skin  Goal: Decreased wound size/increased tissue granulation at next dressing change  Outcome: Progressing  Goal: Participates in plan/prevention/treatment measures  Outcome: Progressing  Goal: Prevent/manage excess moisture  Outcome: Progressing  Goal: Prevent/minimize sheer/friction injuries  Outcome: Progressing  Goal: Promote/optimize nutrition  Outcome: Progressing  Goal: Promote skin healing  Outcome: Progressing

## 2025-08-10 NOTE — CARE PLAN
The patient's goals for the shift include      The clinical goals for the shift include maintain safety      Problem: Pain - Adult  Goal: Verbalizes/displays adequate comfort level or baseline comfort level  8/10/2025 0108 by Casper Colvin RN  Outcome: Progressing  8/10/2025 0107 by Casper Colvin RN  Outcome: Progressing     Problem: Safety - Adult  Goal: Free from fall injury  8/10/2025 0108 by Casper Colvin RN  Outcome: Progressing  8/10/2025 0107 by Casper Colvin RN  Outcome: Progressing     Problem: Discharge Planning  Goal: Discharge to home or other facility with appropriate resources  8/10/2025 0108 by Casper Colvin RN  Outcome: Progressing  8/10/2025 0107 by Casper Colvin RN  Outcome: Progressing     Problem: Chronic Conditions and Co-morbidities  Goal: Patient's chronic conditions and co-morbidity symptoms are monitored and maintained or improved  8/10/2025 0108 by Casper Colvin RN  Outcome: Progressing  8/10/2025 0107 by Casper Colvin RN  Outcome: Progressing     Problem: Nutrition  Goal: Nutrient intake appropriate for maintaining nutritional needs  8/10/2025 0108 by Casper Colvin RN  Outcome: Progressing  8/10/2025 0107 by Casper Colvin RN  Outcome: Progressing     Problem: Skin  Goal: Decreased wound size/increased tissue granulation at next dressing change  8/10/2025 0108 by Casper Colvin RN  Outcome: Progressing  8/10/2025 0107 by Casper Colvin RN  Outcome: Progressing  Goal: Participates in plan/prevention/treatment measures  8/10/2025 0108 by Casper Colvin RN  Outcome: Progressing  8/10/2025 0107 by Casper Colvin RN  Outcome: Progressing  Goal: Prevent/manage excess moisture  8/10/2025 0108 by Casper Colvin RN  Outcome: Progressing  Flowsheets (Taken 8/10/2025 0108)  Prevent/manage excess moisture: Moisturize dry skin  8/10/2025 0107 by Casper Colvin RN  Outcome: Progressing  Goal: Prevent/minimize sheer/friction injuries  8/10/2025 0108 by Casper Colvin RN  Outcome: Progressing  8/10/2025 0107 by  Casper Colvin RN  Outcome: Progressing  Goal: Promote/optimize nutrition  8/10/2025 0108 by Casper Colvin RN  Outcome: Progressing  8/10/2025 0107 by Casper Colvin RN  Outcome: Progressing  Goal: Promote skin healing  8/10/2025 0108 by Casper Colvin RN  Outcome: Progressing  8/10/2025 0107 by Casper Colvin RN  Outcome: Progressing

## 2025-08-10 NOTE — CARE PLAN
The patient's goals for the shift include      The clinical goals for the shift include safety      Problem: Pain - Adult  Goal: Verbalizes/displays adequate comfort level or baseline comfort level  Outcome: Progressing     Problem: Safety - Adult  Goal: Free from fall injury  Outcome: Progressing     Problem: Discharge Planning  Goal: Discharge to home or other facility with appropriate resources  Outcome: Progressing     Problem: Chronic Conditions and Co-morbidities  Goal: Patient's chronic conditions and co-morbidity symptoms are monitored and maintained or improved  Outcome: Progressing     Problem: Nutrition  Goal: Nutrient intake appropriate for maintaining nutritional needs  Outcome: Progressing     Problem: Skin  Goal: Decreased wound size/increased tissue granulation at next dressing change  Outcome: Progressing  Flowsheets (Taken 8/10/2025 0952)  Decreased wound size/increased tissue granulation at next dressing change: Promote sleep for wound healing  Goal: Participates in plan/prevention/treatment measures  Outcome: Progressing  Flowsheets (Taken 8/10/2025 0952)  Participates in plan/prevention/treatment measures:   Elevate heels   Increase activity/out of bed for meals  Goal: Prevent/manage excess moisture  Outcome: Progressing  Flowsheets (Taken 8/10/2025 0952)  Prevent/manage excess moisture:   Monitor for/manage infection if present   Moisturize dry skin  Goal: Prevent/minimize sheer/friction injuries  Outcome: Progressing  Flowsheets (Taken 8/10/2025 0952)  Prevent/minimize sheer/friction injuries:   Use pull sheet   Increase activity/out of bed for meals   HOB 30 degrees or less  Goal: Promote/optimize nutrition  Outcome: Progressing  Flowsheets (Taken 8/10/2025 0952)  Promote/optimize nutrition:   Consume > 50% meals/supplements   Assist with feeding  Goal: Promote skin healing  Outcome: Progressing  Flowsheets (Taken 8/10/2025 0952)  Promote skin healing: Turn/reposition every 2 hours/use  positioning/transfer devices

## 2025-08-11 VITALS
DIASTOLIC BLOOD PRESSURE: 79 MMHG | HEART RATE: 65 BPM | SYSTOLIC BLOOD PRESSURE: 161 MMHG | TEMPERATURE: 97.7 F | WEIGHT: 182.1 LBS | OXYGEN SATURATION: 94 % | HEIGHT: 69 IN | BODY MASS INDEX: 26.97 KG/M2 | RESPIRATION RATE: 18 BRPM

## 2025-08-11 LAB
ALBUMIN SERPL BCP-MCNC: 3.8 G/DL (ref 3.4–5)
ALP SERPL-CCNC: 62 U/L (ref 33–136)
ALT SERPL W P-5'-P-CCNC: 21 U/L (ref 10–52)
ANION GAP SERPL CALC-SCNC: 10 MMOL/L (ref 10–20)
AST SERPL W P-5'-P-CCNC: 18 U/L (ref 9–39)
BILIRUB SERPL-MCNC: 0.5 MG/DL (ref 0–1.2)
BUN SERPL-MCNC: 15 MG/DL (ref 6–23)
CALCIUM SERPL-MCNC: 9 MG/DL (ref 8.6–10.3)
CHLORIDE SERPL-SCNC: 104 MMOL/L (ref 98–107)
CO2 SERPL-SCNC: 29 MMOL/L (ref 21–32)
CREAT SERPL-MCNC: 0.92 MG/DL (ref 0.5–1.3)
EGFRCR SERPLBLD CKD-EPI 2021: 87 ML/MIN/1.73M*2
ERYTHROCYTE [DISTWIDTH] IN BLOOD BY AUTOMATED COUNT: 12.5 % (ref 11.5–14.5)
FOLATE SERPL-MCNC: 21.4 NG/ML
GLUCOSE BLD MANUAL STRIP-MCNC: 140 MG/DL (ref 74–99)
GLUCOSE BLD MANUAL STRIP-MCNC: 161 MG/DL (ref 74–99)
GLUCOSE SERPL-MCNC: 139 MG/DL (ref 74–99)
HCT VFR BLD AUTO: 38 % (ref 41–52)
HGB BLD-MCNC: 12.1 G/DL (ref 13.5–17.5)
HOLD SPECIMEN: NORMAL
MCH RBC QN AUTO: 31.1 PG (ref 26–34)
MCHC RBC AUTO-ENTMCNC: 31.8 G/DL (ref 32–36)
MCV RBC AUTO: 98 FL (ref 80–100)
NRBC BLD-RTO: 0 /100 WBCS (ref 0–0)
PHOSPHATE SERPL-MCNC: 2.6 MG/DL (ref 2.5–4.9)
PLATELET # BLD AUTO: 191 X10*3/UL (ref 150–450)
POTASSIUM SERPL-SCNC: 4.2 MMOL/L (ref 3.5–5.3)
PROT SERPL-MCNC: 6.3 G/DL (ref 6.4–8.2)
RBC # BLD AUTO: 3.89 X10*6/UL (ref 4.5–5.9)
SODIUM SERPL-SCNC: 139 MMOL/L (ref 136–145)
VIT B12 SERPL-MCNC: 411 PG/ML (ref 211–911)
WBC # BLD AUTO: 4.9 X10*3/UL (ref 4.4–11.3)

## 2025-08-11 PROCEDURE — 82947 ASSAY GLUCOSE BLOOD QUANT: CPT

## 2025-08-11 PROCEDURE — 82607 VITAMIN B-12: CPT | Mod: PARLAB | Performed by: INTERNAL MEDICINE

## 2025-08-11 PROCEDURE — 2500000004 HC RX 250 GENERAL PHARMACY W/ HCPCS (ALT 636 FOR OP/ED)

## 2025-08-11 PROCEDURE — 2500000002 HC RX 250 W HCPCS SELF ADMINISTERED DRUGS (ALT 637 FOR MEDICARE OP, ALT 636 FOR OP/ED)

## 2025-08-11 PROCEDURE — 97165 OT EVAL LOW COMPLEX 30 MIN: CPT | Mod: GO

## 2025-08-11 PROCEDURE — 97161 PT EVAL LOW COMPLEX 20 MIN: CPT | Mod: GP

## 2025-08-11 PROCEDURE — 36415 COLL VENOUS BLD VENIPUNCTURE: CPT | Performed by: INTERNAL MEDICINE

## 2025-08-11 PROCEDURE — 85027 COMPLETE CBC AUTOMATED: CPT | Performed by: INTERNAL MEDICINE

## 2025-08-11 PROCEDURE — 84100 ASSAY OF PHOSPHORUS: CPT | Performed by: INTERNAL MEDICINE

## 2025-08-11 PROCEDURE — 80053 COMPREHEN METABOLIC PANEL: CPT | Performed by: INTERNAL MEDICINE

## 2025-08-11 PROCEDURE — 82746 ASSAY OF FOLIC ACID SERUM: CPT | Mod: PARLAB | Performed by: INTERNAL MEDICINE

## 2025-08-11 PROCEDURE — 2500000001 HC RX 250 WO HCPCS SELF ADMINISTERED DRUGS (ALT 637 FOR MEDICARE OP)

## 2025-08-11 RX ORDER — TAMSULOSIN HYDROCHLORIDE 0.4 MG/1
0.4 CAPSULE ORAL NIGHTLY
Qty: 30 CAPSULE | Refills: 0 | Status: SHIPPED | OUTPATIENT
Start: 2025-08-11 | End: 2025-09-10

## 2025-08-11 RX ORDER — POLYETHYLENE GLYCOL 3350 17 G/17G
17 POWDER, FOR SOLUTION ORAL DAILY
Start: 2025-08-12 | End: 2025-09-11

## 2025-08-11 RX ORDER — ACETAMINOPHEN 325 MG/1
650 TABLET ORAL EVERY 4 HOURS PRN
Qty: 30 TABLET | Refills: 0 | Status: SHIPPED | OUTPATIENT
Start: 2025-08-11

## 2025-08-11 RX ORDER — MULTIVIT-MIN/IRON FUM/FOLIC AC 7.5 MG-4
1 TABLET ORAL DAILY
Qty: 30 TABLET | Refills: 11 | Status: SHIPPED | OUTPATIENT
Start: 2025-08-12

## 2025-08-11 RX ORDER — LANOLIN ALCOHOL/MO/W.PET/CERES
100 CREAM (GRAM) TOPICAL DAILY
Qty: 30 TABLET | Refills: 11 | Status: SHIPPED | OUTPATIENT
Start: 2025-08-12

## 2025-08-11 RX ORDER — FOLIC ACID 1 MG/1
1 TABLET ORAL DAILY
Qty: 30 TABLET | Refills: 11 | Status: SHIPPED | OUTPATIENT
Start: 2025-08-12

## 2025-08-11 RX ADMIN — CLOPIDOGREL BISULFATE 75 MG: 75 TABLET, FILM COATED ORAL at 08:04

## 2025-08-11 RX ADMIN — LISINOPRIL 20 MG: 20 TABLET ORAL at 08:03

## 2025-08-11 RX ADMIN — THIAMINE HYDROCHLORIDE 100 MG: 100 INJECTION, SOLUTION INTRAMUSCULAR; INTRAVENOUS at 08:04

## 2025-08-11 RX ADMIN — ATORVASTATIN CALCIUM 40 MG: 40 TABLET, FILM COATED ORAL at 08:03

## 2025-08-11 RX ADMIN — Medication 1 TABLET: at 08:04

## 2025-08-11 RX ADMIN — ASPIRIN 81 MG CHEWABLE TABLET 81 MG: 81 TABLET CHEWABLE at 08:04

## 2025-08-11 RX ADMIN — GABAPENTIN 100 MG: 100 CAPSULE ORAL at 15:06

## 2025-08-11 RX ADMIN — GABAPENTIN 100 MG: 100 CAPSULE ORAL at 08:04

## 2025-08-11 RX ADMIN — FOLIC ACID 1 MG: 1 TABLET ORAL at 08:04

## 2025-08-11 RX ADMIN — DOCUSATE SODIUM 100 MG: 100 CAPSULE, LIQUID FILLED ORAL at 08:03

## 2025-08-11 RX ADMIN — HYDRALAZINE HYDROCHLORIDE 25 MG: 25 TABLET ORAL at 08:04

## 2025-08-11 RX ADMIN — INSULIN LISPRO 1 UNITS: 100 INJECTION, SOLUTION INTRAVENOUS; SUBCUTANEOUS at 12:09

## 2025-08-11 RX ADMIN — DULOXETINE 60 MG: 30 CAPSULE, DELAYED RELEASE ORAL at 08:03

## 2025-08-11 ASSESSMENT — COGNITIVE AND FUNCTIONAL STATUS - GENERAL
WALKING IN HOSPITAL ROOM: A LITTLE
MOBILITY SCORE: 20
DRESSING REGULAR LOWER BODY CLOTHING: A LITTLE
HELP NEEDED FOR BATHING: A LITTLE
TOILETING: A LITTLE
STANDING UP FROM CHAIR USING ARMS: A LITTLE
DAILY ACTIVITIY SCORE: 21
MOVING TO AND FROM BED TO CHAIR: A LITTLE
CLIMB 3 TO 5 STEPS WITH RAILING: A LITTLE

## 2025-08-11 ASSESSMENT — LIFESTYLE VARIABLES
VISUAL DISTURBANCES: NOT PRESENT
AGITATION: NORMAL ACTIVITY
NAUSEA AND VOMITING: NO NAUSEA AND NO VOMITING
AGITATION: NORMAL ACTIVITY
TREMOR: NO TREMOR
HEADACHE, FULLNESS IN HEAD: NOT PRESENT
ANXIETY: NO ANXIETY, AT EASE
PAROXYSMAL SWEATS: NO SWEAT VISIBLE
HEADACHE, FULLNESS IN HEAD: NOT PRESENT
VISUAL DISTURBANCES: NOT PRESENT
TREMOR: NO TREMOR
PAROXYSMAL SWEATS: NO SWEAT VISIBLE
ANXIETY: NO ANXIETY, AT EASE
TOTAL SCORE: 0
AUDITORY DISTURBANCES: NOT PRESENT
ORIENTATION AND CLOUDING OF SENSORIUM: ORIENTED AND CAN DO SERIAL ADDITIONS
ORIENTATION AND CLOUDING OF SENSORIUM: ORIENTED AND CAN DO SERIAL ADDITIONS
NAUSEA AND VOMITING: NO NAUSEA AND NO VOMITING
AUDITORY DISTURBANCES: NOT PRESENT

## 2025-08-11 ASSESSMENT — PAIN - FUNCTIONAL ASSESSMENT
PAIN_FUNCTIONAL_ASSESSMENT: 0-10
PAIN_FUNCTIONAL_ASSESSMENT: 0-10

## 2025-08-11 ASSESSMENT — PAIN SCALES - GENERAL
PAINLEVEL_OUTOF10: 0 - NO PAIN
PAINLEVEL_OUTOF10: 0 - NO PAIN

## 2025-08-11 NOTE — DISCHARGE SUMMARY
Discharge Diagnosis  Metabolic acidosis           Issues Requiring Follow-Up  8/11/2025 Patient fully examined at the bedside. Brought to hospital -metabolic acidosis, AMS, had concerns including Alcohol Intoxication.  Educated on alcohol cessation, community resources provided. Per [facility]- DC plan is for home , pt recently retired, has a lot of stress in life r/t his quadriplegic son whom he and his spouse care for. Pt denies any needs for resources for alcohol.No distress noted. Awake, more animated, with no acute events overnight, no new complaints. Slow but progressive improvements noted.  Patient medically stable at time of exam, cleared for discharge today. Goals of care emphasized with patient and family, will continue current plan of care.     Discharge Meds     Medication List      ASK your doctor about these medications     aspirin 81 mg chewable tablet   atorvastatin 40 mg tablet; Commonly known as: Lipitor   clopidogrel 75 mg tablet; Commonly known as: Plavix   docusate sodium 100 mg capsule; Commonly known as: Colace   DULoxetine 60 mg DR capsule; Commonly known as: Cymbalta   fluticasone 50 mcg/actuation nasal spray; Commonly known as: Flonase   gabapentin 100 mg capsule; Commonly known as: Neurontin   glimepiride 4 mg tablet; Commonly known as: Amaryl; Take 1 tablet (4 mg)   by mouth once daily with breakfast.   hydrALAZINE 25 mg tablet; Commonly known as: Apresoline; Take 1 tablet   (25 mg) by mouth 2 times a day.   lisinopril 20 mg tablet   metFORMIN 500 mg tablet; Commonly known as: Glucophage   sertraline 100 mg tablet; Commonly known as: Zoloft   tamsulosin 0.4 mg 24 hr capsule; Commonly known as: Flomax       Test Results Pending At Discharge  Pending Labs       Order Current Status    Extra Tubes Preliminary result    SST TOP Preliminary result            Hospital Course           Wilfredo Burnham MD   Physician  Internal Medicine     H&P      Signed     Date of Service: 8/9/2025  2:51 PM    "  Signed         History Of Present Illness  Modesto Lorenz is a 74 y.o. male with a past medical history of DMII, stroke, HLD, b/l carotid artery stenosis s/p carotid shunt who presents to the emergency department after patient's neighbor called EMS with concern about patient's wellbeing.  Per report, \"patient found slumped over at table with a bottle of liza nearby, covered in emesis and feces.  Patient is incoherent and alert and oriented x 1.\"  Patient was recently admitted from 6/4-6/6 with altered mental status, concern for stroke.  Patient was seen by neurology while hospitalized.  Given evidence of worsening intracranial stenosis in this patient, it was recommended to increase his atorvastatin to 80 mg p.o. daily.  Patient was to follow-up with Dr. Steven MORGAN with vascular neurology.  Patient was ultimately discharged home in stable condition.  Per ED documentation “Modesto's wife, Lisa, arrived ~2 hours later and was able to provide some additional history. She reports he quit drinking in 2004 but over the past month she has noticed that he smells like alcohol. She also described behavior change during this time, stating he has been \"nasty\" to her. Modesto spoke up at this point and was asking repeatedly what is wrong with him and how he got to the emergency department. On further questioning he reports buying a bottle of liza this morning because he has been upset with his home life. He denies being in pain. Review of systems limited by intoxication.”  At the time of my interview, patient is asleep, but easily arousable.  He is slurring his speech and speaking very loudly, appears intoxicated.  Wife presents to the bedside shortly after.  The patient tells me that he bought a bottle of liza on Friday night and had \"2 glasses.\"  Patient's wife states that she was at her son's, who is a quadriplegic, taking care of him and so she is unsure of how much liza he actually had.  She tells me that patient stopped cold " "turkey drinking alcohol in 2004 after he forgot to pick her up from her hysterectomy.  She states that over the past 1 year, she has smelled alcohol, but dismissed it because she did not believe he had started drinking again.  She states that the smell of alcohol got stronger over the past couple of months, but still had never seen any alcohol in the house.  She states that this morning, their neighbor thought he heard Modesto calling for him inside the house, but when the neighbor went to the front door, nobody answered.  He then found Modesto slumped over at the table with emesis all over him with a bottle of liza beside him.  EMS was subsequently called.  Patient's wife expresses a lot of frustration and concern over this issue.  She states that patient has also been falling a lot recently, with his most recent fall last week.  Patient states that he has pain in his right arm, but denies any pain/injury anywhere else.  Patient's wife is interested in having patient seek help, though she states patient is \"stubborn, holds grudges.\"  She denies any other drug use or tobacco use in the patient.     ED course: On arrival, patient's BP 89/61, heart rate 70, respirations 22, afebrile, saturating 90%.  Now saturating 100% on 2 L NC.  Labs and imaging performed, revealing glucose 253, sodium 135, BUN 26, LFTs WNL.  Lipase WNL.  Magnesium WNL.  Initial troponin negative.  Lactate 4.0.  Fluids given.  No white count.  Hemoglobin slightly low at 12.1.  Platelets WNL.  Initial VBG shows pH of 7.25, pCO2 was normal, HCO3 is 18.9, and lactate 3.7.  Repeat VBG shows pH 7.23, HCO3 is 20.5.  Urinalysis negative for signs of infection.  CT head shows no evidence of acute intracranial process.  Chest x-ray shows no evidence of acute cardiopulmonary process.  CT angio chest for PE shows no PE.  Mild interstitial edema.  Mild dilatation of the ascending aorta up to 4.1 cm.  CT abdomen/pelvis shows nonspecific periportal edema.  " Diagnostic considerations may include overhydration, elevated right heart pressures, or hepatitis.  Delayed opacification of the right kidney compared to the left suggesting arterial stenosis.  EKG, per ED physician, rate of 67 bpm, significantly prolonged MT interval constituting a first-degree AV block, otherwise a right bundle branch block and left anterior fascicular block are present, left axis deviation consistent with this, otherwise normal ST segments and T waves.  Patient given 2 L LR boluses in the ED, lactate ordered and pending.   Past Medical History  [Medical History]    [Medical History]  Past Medical History  No past medical history on file.     Surgical History  [Surgical History]    [Surgical History]  Past Surgical History        Procedure Laterality Date    CT ANGIO NECK   12/14/2021     CT NECK ANGIO W AND WO IV CONTRAST 12/14/2021 [facility]    CT HEAD ANGIO W AND WO IV CONTRAST   12/14/2021     CT HEAD ANGIO W AND WO IV CONTRAST 12/14/2021 [facility]    MR HEAD ANGIO WO IV CONTRAST   12/13/2021     MR HEAD ANGIO WO IV CONTRAST 12/13/2021 [facility]    MR NECK ANGIO WO IV CONTRAST   12/13/2021     MR NECK ANGIO WO IV CONTRAST 12/13/2021 [facility]    OTHER SURGICAL HISTORY   12/23/2021     Carotid thromboendarterectomy        Social History  He reports that he quit smoking about 4 years ago. His smoking use included cigarettes. He has quit using smokeless tobacco. No history on file for alcohol use and drug use.     Family History  [Family History]    [Family History]  No family history on file.     Allergies  Patient has no known allergies.     Review of Systems  Limited due to intoxication.     Physical Exam  Constitutional:       Appearance: He is not ill-appearing or toxic-appearing.      Comments: Slurred speech, intoxication.   HENT:      Head: Normocephalic and atraumatic.      Nose: Nose normal.      Mouth/Throat:      Mouth: Mucous membranes are  "moist.      Pharynx: Oropharynx is clear.      Eyes:      Extraocular Movements: Extraocular movements intact.      Conjunctiva/sclera: Conjunctivae normal.      Pupils: Pupils are equal, round, and reactive to light.         Cardiovascular:      Rate and Rhythm: Normal rate and regular rhythm.   Pulmonary:      Effort: Pulmonary effort is normal.      Breath sounds: Normal breath sounds. No wheezing or rales.   Abdominal:      General: Abdomen is flat. There is distension.      Palpations: Abdomen is soft.      Tenderness: There is no abdominal tenderness.      Musculoskeletal:         General: Normal range of motion.      Cervical back: Normal range of motion.      Right lower leg: No edema.      Left lower leg: No edema.      Skin:     General: Skin is warm and dry.      Findings: Bruising present.      Neurological:      Mental Status: He is disoriented.           Last Recorded Vitals  Blood pressure 126/58, pulse 83, temperature 36.4 °C (97.5 °F), temperature source Temporal, resp. rate (!) 22, height 1.753 m (5' 9\"), weight 82.6 kg (182 lb 1.6 oz), SpO2 100%.     Relevant Results     Scheduled medications  [Scheduled Medications]    [Scheduled Medications]  aspirin, 81 mg, oral, Daily  atorvastatin, 40 mg, oral, Daily  clopidogrel, 75 mg, oral, Daily  docusate sodium, 100 mg, oral, Daily  DULoxetine, 60 mg, oral, Daily  folic acid, 1 mg, oral, Daily  gabapentin, 100 mg, oral, TID  hydrALAZINE, 25 mg, oral, BID  insulin lispro, 0-5 Units, subcutaneous, TID AC  lisinopril, 20 mg, oral, Daily  multivitamin with minerals, 1 tablet, oral, Daily  polyethylene glycol, 17 g, oral, Daily  tamsulosin, 0.4 mg, oral, Nightly  [START ON 8/12/2025] thiamine, 100 mg, oral, Daily  thiamine, 100 mg, intravenous, Daily     Continuous medications  [Continuous Medications]    [Continuous Medications]  sodium chloride 0.9%, 75 mL/hr     PRN medications  [PRN Medications]    [PRN Medications]  PRN medications: acetaminophen **OR** " acetaminophen **OR** acetaminophen, dextrose, dextrose, diazePAM, glucagon, glucagon           Results for orders placed or performed during the hospital encounter of 08/09/25 (from the past 24 hours)   BLOOD GAS VENOUS FULL PANEL   Result Value Ref Range     POCT pH, Venous 7.25 (LL) 7.33 - 7.43 pH     POCT pCO2, Venous 43 41 - 51 mm Hg     POCT pO2, Venous 53 (H) 35 - 45 mm Hg     POCT SO2, Venous 82 (H) 45 - 75 %     POCT Oxy Hemoglobin, Venous 80.4 (H) 45.0 - 75.0 %     POCT Hematocrit Calculated, Venous 38.0 (L) 41.0 - 52.0 %     POCT Sodium, Venous 135 (L) 136 - 145 mmol/L     POCT Potassium, Venous 3.7 3.5 - 5.3 mmol/L     POCT Chloride, Venous 104 98 - 107 mmol/L     POCT Ionized Calicum, Venous 1.07 (L) 1.10 - 1.33 mmol/L     POCT Glucose, Venous 273 (H) 74 - 99 mg/dL     POCT Lactate, Venous 3.7 (H) 0.4 - 2.0 mmol/L     POCT Base Excess, Venous -8.0 (L) -2.0 - 3.0 mmol/L     POCT HCO3 Calculated, Venous 18.9 (L) 22.0 - 26.0 mmol/L     POCT Hemoglobin, Venous 12.6 (L) 13.5 - 17.5 g/dL     POCT Anion Gap, Venous 16.0 10.0 - 25.0 mmol/L     Patient Temperature 37.0 degrees Celsius     FiO2 21 %     Critical Called By THIAGO ARROYO RRT       Critical Called To DR CORONA       Critical Call Time 859       Critical Read Back Y     CBC   Result Value Ref Range     WBC 7.8 4.4 - 11.3 x10*3/uL     nRBC 0.0 0.0 - 0.0 /100 WBCs     RBC 3.91 (L) 4.50 - 5.90 x10*6/uL     Hemoglobin 12.1 (L) 13.5 - 17.5 g/dL     Hematocrit 38.7 (L) 41.0 - 52.0 %     MCV 99 80 - 100 fL     MCH 30.9 26.0 - 34.0 pg     MCHC 31.3 (L) 32.0 - 36.0 g/dL     RDW 12.8 11.5 - 14.5 %     Platelets 228 150 - 450 x10*3/uL   Comprehensive metabolic panel   Result Value Ref Range     Glucose 253 (H) 74 - 99 mg/dL     Sodium 135 (L) 136 - 145 mmol/L     Potassium 3.7 3.5 - 5.3 mmol/L     Chloride 103 98 - 107 mmol/L     Bicarbonate 21 21 - 32 mmol/L     Anion Gap 15 10 - 20 mmol/L     Urea Nitrogen 26 (H) 6 - 23 mg/dL     Creatinine 1.24 0.50 - 1.30 mg/dL      eGFR 61 >60 mL/min/1.73m*2     Calcium 8.4 (L) 8.6 - 10.3 mg/dL     Albumin 4.0 3.4 - 5.0 g/dL     Alkaline Phosphatase 55 33 - 136 U/L     Total Protein 6.3 (L) 6.4 - 8.2 g/dL     AST 17 9 - 39 U/L     Bilirubin, Total 0.2 0.0 - 1.2 mg/dL     ALT 19 10 - 52 U/L   Lipase   Result Value Ref Range     Lipase 12 9 - 82 U/L   Acute Toxicology Panel, Blood   Result Value Ref Range     Acetaminophen <10.0 10.0 - 30.0 ug/mL     Salicylate  <3 4 - 20 mg/dL     Alcohol 251 (H) <=10 mg/dL   Troponin I, High Sensitivity   Result Value Ref Range     Troponin I, High Sensitivity 8 0 - 20 ng/L   Magnesium   Result Value Ref Range     Magnesium 1.80 1.60 - 2.40 mg/dL   Coagulation Screen   Result Value Ref Range     Protime 11.3 9.8 - 12.4 seconds     INR 1.0 0.9 - 1.1     aPTT 29 26 - 36 seconds   Urinalysis with Reflex Culture and Microscopic   Result Value Ref Range     Color, Urine Yellow Light-Yellow, Yellow, Dark-Yellow     Appearance, Urine Clear Clear     Specific Gravity, Urine 1.021 1.005 - 1.035     pH, Urine 5.0 5.0, 5.5, 6.0, 6.5, 7.0, 7.5, 8.0     Protein, Urine NEGATIVE NEGATIVE, 10 (TRACE), 20 (TRACE) mg/dL     Glucose, Urine 500 (3+) (A) Normal mg/dL     Blood, Urine NEGATIVE NEGATIVE mg/dL     Ketones, Urine NEGATIVE NEGATIVE mg/dL     Bilirubin, Urine NEGATIVE NEGATIVE mg/dL     Urobilinogen, Urine Normal Normal mg/dL     Nitrite, Urine NEGATIVE NEGATIVE     Leukocyte Esterase, Urine NEGATIVE NEGATIVE   Lactate   Result Value Ref Range     Lactate 4.0 (HH) 0.4 - 2.0 mmol/L   Blood Gas Venous   Result Value Ref Range     POCT pH, Venous 7.23 (LL) 7.33 - 7.43 pH     POCT pCO2, Venous 49 41 - 51 mm Hg     POCT pO2, Venous 53 (H) 35 - 45 mm Hg     POCT SO2, Venous 81 (H) 45 - 75 %     POCT Oxy Hemoglobin, Venous 80.0 (H) 45.0 - 75.0 %     POCT Base Excess, Venous -7.2 (L) -2.0 - 3.0 mmol/L     POCT HCO3 Calculated, Venous 20.5 (L) 22.0 - 26.0 mmol/L     Patient Temperature 37.0 degrees Celsius     FiO2 28 %      Critical Called By THIAGO ARROYO RRT       Critical Called To DR CORONA       Critical Call Time 1038       Critical Read Back Y        CT abdomen pelvis w IV contrast  Result Date: 8/9/2025  Interpreted By:  Prasanth Carranza, STUDY: CT ABDOMEN PELVIS W IV CONTRAST;  8/9/2025 11:44 am   INDICATION: Signs/Symptoms:increasing lactate, sepsis w/u.   COMPARISON: None.   ACCESSION NUMBER(S): [id number]   ORDERING CLINICIAN: KEYUR CORONA   TECHNIQUE: CT of the abdomen and pelvis was performed.  Contiguous axial images were obtained at 3 mm slice thickness through the abdomen and pelvis. Coronal and sagittal reconstructions at 3 mm slice thickness were performed. 75 mL Omnipaque 350 administered intravenously without immediate complication.   FINDINGS: LOWER CHEST: Mild bibasilar atelectasis   ABDOMEN: Slightly motion degraded exam. There also appears to be artifact relating to devices external to the patient.   LIVER: Periportal edema   BILE DUCTS: Not dilated.   GALLBLADDER: No calcified stone or definite inflammation.   PANCREAS: Unremarkable   SPLEEN: Unremarkable   ADRENAL GLANDS: Slightly hyperplastic.   KIDNEYS AND URETERS: There is delayed enhancement of the right kidney, in the corticomedullary phase, compared to the left kidney which is in the nephrographic phase Otherwise unremarkable kidneys without hydronephrosis.   PELVIS:   BLADDER: Partially distended.   REPRODUCTIVE ORGANS: Mild prostatomegaly.   BOWEL: No findings of bowel obstruction or inflammation.    Unremarkable appendix.    Unremarkable mesentery.   VESSELS: Aortoiliac system is patent without aneurysm.  Major visceral branches are patent.Severe atherosclerosis. IVC and major branches are grossly patent. Major portal venous branches are patent.   PERITONEUM AND RETROPERITONEUM: No free fluid or free air.    No abdominal or pelvic lymphadenopathy.   BONE AND ABDOMINAL WALL: No acute skeletal findings.  Severe lumbar degenerative changes with  dextroscoliosis. Grade 1 anterolisthesis L4-5.        1.  Nonspecific periportal edema. Diagnostic considerations may include over-hydration, elevated right heart pressures, or hepatitis. 2. No additional acute findings identified on motion degraded exam. 3. Delayed opacification of the right kidney compared to the left suggesting arterial stenosis.   MACRO: None.   Signed by: Prasanth Carranza 8/9/2025 12:45 PM Dictation workstation:   [id number]     CT angio chest for pulmonary embolism  Result Date: 8/9/2025  Interpreted By:  Prasanth Carranza, STUDY: CT ANGIO CHEST FOR PULMONARY EMBOLISM;  8/9/2025 11:43 am   INDICATION: Signs/Symptoms:hypoxia.   COMPARISON: None.   ACCESSION NUMBER(S): [id number]   ORDERING CLINICIAN: KEYUR CORONA   TECHNIQUE: Helical data acquisition of the chest was obtained with  75 mL Omnipaque 350. Images were reformatted in axial, coronal, and sagittal planes.MIP reformatted images were also generated.   FINDINGS: LUNGS and AIRWAYS: Mild biapical interlobular septal thickening indicating interstitial edema. Mild dependent and bibasilar atelectasis. There is some respiratory motion which can obscure findings. Small subpleural scar in the lingula noted.   Central airways are patent. No bronchiectasis. No pleural effusion or pneumothorax.   MEDIASTINUM and MARIO, LOWER NECK AND AXILLA: The visualized thyroid gland is grossly unremarkable.   No thoracic lymphadenopathy by CT criteria.   Esophagus is not dilated.   HEART and VESSELS: Mild cardiomegaly.   No significant pericardial effusion.   No pulmonary embolism is identified on motion degraded images.   Severe coronary atherosclerosis.   Thoracic aorta is severely atherosclerotic but otherwise patent . Mild dilatation of the ascending aorta up to 4.1 cm. Great vessels are tortuous but otherwise patent.   UPPER ABDOMEN: Separate report.   CHEST WALL and OSSEOUS STRUCTURES: Thoracic spine degenerative changes with mild dextroscoliosis.          1.   No pulmonary embolism identified on motion degraded exam. 2. Mild interstitial edema. 3. Mild dilatation of the ascending aorta up to 4.1 cm.     Signed by: Prasanth Carranza 8/9/2025 12:05 PM Dictation workstation:   [id number]     XR chest 1 view  Result Date: 8/9/2025  Interpreted By:  Jorje Thakkar, STUDY: XR CHEST 1 VIEW;  8/9/2025 9:48 am   INDICATION: Signs/Symptoms:concern for aspiration.   COMPARISON: 06/04/2025   ACCESSION NUMBER(S): [id number]   ORDERING CLINICIAN: KEYUR CORONA   FINDINGS: AP radiograph of the chest was provided.   DEVICES: None   CARDIOMEDIASTINAL SILHOUETTE: Cardiomediastinal silhouette is normal in size and configuration.No significant atherosclerotic calcification.   LUNGS: No focal consolidation. No pneumothorax. No pleural effusion. Bibasilar atelectasis   BONES: No acute osseous changes.        1.  No evidence of acute cardiopulmonary process.     Signed by: Jorje Thakkar 8/9/2025 10:26 AM Dictation workstation:   [id number]     CT head wo IV contrast  Result Date: 8/9/2025  Interpreted By:  Cheng Mcclure, STUDY: CT HEAD WO IV CONTRAST;  8/9/2025 9:20 am   INDICATION: Signs/Symptoms:ams.     COMPARISON: 08/09/2025   ACCESSION NUMBER(S): [id number]   ORDERING CLINICIAN: KEYUR CORONA   TECHNIQUE: Unenhanced images were obtained through the brain.   FINDINGS: There is atrophy resulting in prominence of the ventricles and sulci. There are mild areas of decreased attenuation within the white matter which are nonspecific but are commonly associated with small vessel ischemic disease. There is no mass effect or midline shift. No acute intracranial hemorrhage is identified. No extra-axial fluid collections are seen. No intraparenchymal mass lesions are identified.  Bone windows demonstrate no evidence of an acute calvarial fracture.        No evidence of an acute intracranial process.   MACRO: None.   Signed by: Cheng Mcclure 8/9/2025 9:23 AM Dictation workstation:   [id number]      Assessment & Plan  Metabolic acidosis     Lactic acidosis     Falls        Altered mental status  Alcohol use (alc level 251)  Lactic acidosis  Nonspecific periportal edema on CT imaging  Hx falls  Hx CVA  B/L carotid artery stenosis  - Patient presented to the emergency department after EMS found him slumped over a table with a bottle of liza next to him, covered in feces/vomit  - Alcohol level 251 on arrival, lactate 3.7 (from VBG), patient given 1 L LR bolus, repeat lactate 4.0.  Additional liter of fluid given, repeat lactate pending  - Initial VBG shows pH of 7.25, pCO2 was normal, HCO3 is 18.9, and lactate 3.7.  Repeat VBG shows pH 7.23, HCO3 is 20.5. Additional VBG added on to trend pH  - Imaging results as above, no infectious process noted, no white count, patient afebrile  - Urinalysis negative for signs of infection  - Add on CIWA protocol  - PT/OT, SW/TCC - patient's wife would like information about resources to help her  with alcohol  - Add on BNP, RUQ US, consider addition of echo  - Fall precautions     First-degree AV block  - EKG, per ED physician, rate of 67 bpm, significantly prolonged AK interval constituting a first-degree AV block, otherwise a right bundle branch block and left anterior fascicular block are present, left axis deviation consistent with this, otherwise normal ST segments and T waves     Type 2 diabetes mellitus  - Per EMS, patient's glucose was >500, though 253 here in the ED. Patient is acidotic, though without an anion gap and no ketones present in urine  - Patient on 500 mg metformin 2 times daily and glimepiride at home  - SSI, hypoglycemic protocol, diabetic diet  - Hemoglobin A1c as of 2 months ago was 11.8%     DVT prophylaxis  -SCDs        Patient fully evaluated 08/09 ,thorough record review performed of previous labs and notes from prior encounters. Plan discussed with interdisciplinary team, consults placed, appreciate input. Will continue current and  repeat labs in the AM.          Discharge planning discussed with patient and care team. Therapy evaluations ordered. Patient aware and agreeable to current plan, continue plan as above.      I spent a total of 75 minutes on the date of the service which included preparing to see the patient, face-to-face patient care, completing clinical documentation, obtaining and/or reviewing separately obtained history, performing a medically appropriate examination, counseling and educating the patient/family/caregiver, ordering medications, tests, or procedures, communicating with other HCPs (not separately reported), independently interpreting results (not separately reported), communicating results to the patient/family/caregiver, and care coordination (not separately reported).               Chanell Deandra                    Revision History    Patient fully evaluated  8/11/2025 for   Assessment & Plan  Metabolic acidosis    Lactic acidosis    Falls      Patient with significant clinical improvement noted, patient medically cleared for discharge today. Patient seen resting in bed with head of bed elevated, no s/s or c/o acute difficulties at this time. Vital signs for last 24 hours:  Temp:  [36.1 °C (97 °F)-36.5 °C (97.7 °F)] 36.5 °C (97.7 °F)  Heart Rate:  [65-74] 65  Resp:  [16-20] 18  BP: (126-161)/(65-79) 161/79 Medications and labs reviewed-   Results for orders placed or performed during the hospital encounter of 08/09/25 (from the past 24 hours)   POCT GLUCOSE   Result Value Ref Range    POCT Glucose 120 (H) 74 - 99 mg/dL   POCT GLUCOSE   Result Value Ref Range    POCT Glucose 144 (H) 74 - 99 mg/dL   SST TOP   Result Value Ref Range    Extra Tube Hold for add-ons.    Phosphorus   Result Value Ref Range    Phosphorus 2.6 2.5 - 4.9 mg/dL   Comprehensive Metabolic Panel   Result Value Ref Range    Glucose 139 (H) 74 - 99 mg/dL    Sodium 139 136 - 145 mmol/L    Potassium 4.2 3.5 - 5.3 mmol/L    Chloride 104 98 - 107  mmol/L    Bicarbonate 29 21 - 32 mmol/L    Anion Gap 10 10 - 20 mmol/L    Urea Nitrogen 15 6 - 23 mg/dL    Creatinine 0.92 0.50 - 1.30 mg/dL    eGFR 87 >60 mL/min/1.73m*2    Calcium 9.0 8.6 - 10.3 mg/dL    Albumin 3.8 3.4 - 5.0 g/dL    Alkaline Phosphatase 62 33 - 136 U/L    Total Protein 6.3 (L) 6.4 - 8.2 g/dL    AST 18 9 - 39 U/L    Bilirubin, Total 0.5 0.0 - 1.2 mg/dL    ALT 21 10 - 52 U/L   CBC   Result Value Ref Range    WBC 4.9 4.4 - 11.3 x10*3/uL    nRBC 0.0 0.0 - 0.0 /100 WBCs    RBC 3.89 (L) 4.50 - 5.90 x10*6/uL    Hemoglobin 12.1 (L) 13.5 - 17.5 g/dL    Hematocrit 38.0 (L) 41.0 - 52.0 %    MCV 98 80 - 100 fL    MCH 31.1 26.0 - 34.0 pg    MCHC 31.8 (L) 32.0 - 36.0 g/dL    RDW 12.5 11.5 - 14.5 %    Platelets 191 150 - 450 x10*3/uL   Vitamin B12   Result Value Ref Range    Vitamin B12 411 211 - 911 pg/mL   Folate   Result Value Ref Range    Folate, Serum 21.4 >5.0 ng/mL   POCT GLUCOSE   Result Value Ref Range    POCT Glucose 140 (H) 74 - 99 mg/dL   POCT GLUCOSE   Result Value Ref Range    POCT Glucose 161 (H) 74 - 99 mg/dL      Patient recently received an antibiotic (last 12 hours)       None           No results found for the last 90 days.             Continue aggressive pulmonary hygiene and oral hygiene. Off loading as tolerated for skin integrity. No new complaints per patient, stable at time of exam.  Plan discussed with interdisciplinary team, no acute events overnight, patient denies chest pain, worsening SOB at this time. Ok to discharge, will continue current and repeat labs in the AM if patient still hospitalized. Patient aware and agreeable to current plan, continue plan as above.     I spent 60 minutes on the date of the service which included preparing to see the patient, face-to-face patient care, completing clinical documentation, obtaining and/or reviewing separately obtained history, performing a medically appropriate examination, counseling and educating the patient/family/caregiver,  ordering medications, tests, or procedures, communicating with other HCPs (not separately reported), independently interpreting results (not separately reported), communicating results to the patient/family/caregiver, and care coordination (not separately reported   Pertinent Physical Exam At Time of Discharge  Physical Exam  no acute events overnight, patient denies chest pain, worsening SOB at this time.  Outpatient Follow-Up  No future appointments.      Chanell Liriano

## 2025-08-11 NOTE — PROGRESS NOTES
Occupational Therapy    Evaluation    Patient Name: Modesto Lorenz  MRN: [id number]  Department: [facility]  Room: [address] [place] [address]  Today's Date: 8/11/2025  Time Calculation  Start Time: 0816  Stop Time: 0834  Time Calculation (min): 18 min        Assessment:  OT Assessment: patient grossly MOD I-SBA upon eval this date; no skilled OT needs indicated after d/c  Prognosis: Excellent  End of Session Communication: Bedside nurse  End of Session Patient Position: Bed, 2 rail up, Alarm on (Call light in reach, all needs met)  OT Assessment Results: Decreased ADL status, Decreased endurance, Decreased functional mobility  Prognosis: Excellent  Strengths: Living arrangement secure, Physical health, Premorbid level of function, Support and attitude of living partners  Barriers to Participation: Comorbidities  Plan:  Treatment Interventions: ADL retraining, Functional transfer training, UE strengthening/ROM, Endurance training, Equipment evaluation/education, Compensatory technique education  OT Frequency: 2 times per week  OT Discharge Recommendations: No OT needed after discharge  OT - OK to Discharge: Yes (once medically appropriate to next level of care)  Treatment Interventions: ADL retraining, Functional transfer training, UE strengthening/ROM, Endurance training, Equipment evaluation/education, Compensatory technique education    Subjective   Current Problem:  1. Metabolic acidosis        2. Hypoxia          OT Visit Info:  OT Received On: 08/11/25  General:  General  Reason for Referral: OT eval and tx; ADLs. dx; 8/9: Metabolic acidosis, lactic acidosis, falls. CT head, chest xray; negative acute findings. CTA chest: negative PE. Xray right shoulder and right elbow: negative for acute findings. US RUQ: negative.  Referred By: Chacha  Past Medical History Relevant to Rehab: 74 y.o. male with a past medical history of DMII, stroke, HLD, b/l carotid artery stenosis s/p carotid shunt who presents to the emergency department after  "patient's neighbor called EMS with concern about patient's wellbeing.  Per report, \"patient found slumped over at table with a bottle of ilza nearby, covered in emesis and feces.  Patient is incoherent and alert and oriented x 1.\"  Patient was recently admitted from 6/4-6/6 with altered mental status, concern for stroke.  Patient was seen by neurology while hospitalized.  Co-Treatment: PT  Co-Treatment Reason: for safety and to maximize therapeutic performance  Prior to Session Communication: Bedside nurse  Patient Position Received: Bed, 2 rail up, Alarm off, not on at start of session  General Comment: patient pleasant and cooperative; however, verbose, requires max cues for direction to task throughout  Precautions:  Medical Precautions: Fall precautions (alarms, CIWA, tele)            Pain:  Pain Assessment  Pain Assessment:  (denies pain)    Objective   Cognition:  Overall Cognitive Status: Within Functional Limits           Home Living:  Type of Home:  (per chart review and patient report; lives with spouse and son who is quadriplegic. [address] home. 12 steps to bedroom, however, sleeps in recliner on first floor. basement workshop, 12 steps with HR)  Bathroom Shower/Tub:  (WIS, HHS with GB)  Bathroom Toilet:  (raised toilet seat with GB)  Prior Function:  Level of [place]:  (independent in ADLs PLOF. spouse does IADLs. use of cane PLOF. patient helps care for son who is quadriplegic)  IADL History:     ADL:  ADL Comments: anticipate SBA for ADLs based on performance with transfers and mobility this date  Activity Tolerance:  Endurance: Endurance does not limit participation in activity  Bed Mobility/Transfers: Bed Mobility  Bed Mobility:  (completed supine <-->sit at MOD I)    Transfers  Transfer:  (completed STS with SBA)      Functional Mobility:  Functional Mobility  Functional Mobility Performed:  (engaged in simple mobility with SBA with WW)     Sensation:  Light Touch: No apparent " deficits  Strength:  Strength Comments: BUE ROM/MMT WFL for patient age as seen through transfers/mobility this date      Outcome Measures:[facility] Daily Activity  Putting on and taking off regular lower body clothing: A little  Bathing (including washing, rinsing, drying): A little  Putting on and taking off regular upper body clothing: None  Toileting, which includes using toilet, bedpan or urinal: A little  Taking care of personal grooming such as brushing teeth: None  Eating Meals: None  Daily Activity - Total Score: 21        Education Documentation  Body Mechanics, taught by Janeth Lee OT at 8/11/2025 11:45 AM.  Learner: Patient  Readiness: Acceptance  Method: Explanation  Response: Verbalizes Understanding, Needs Reinforcement    ADL Training, taught by Janeth Lee OT at 8/11/2025 11:45 AM.  Learner: Patient  Readiness: Acceptance  Method: Explanation  Response: Verbalizes Understanding, Needs Reinforcement    Education Comments  No comments found.        OP EDUCATION:       Goals:  Encounter Problems       Encounter Problems (Active)       OT Goals       STG-patient will complete LB dressing at MOD I with use of ae/ad/dme prn  (Progressing)       Start:  08/11/25    Expected End:  08/25/25            STG- patient will complete toileting at MOD I with use of ae/ad/dme prn  (Progressing)       Start:  08/11/25    Expected End:  08/25/25            STG- patient will complete transfers to/from bed, chair, commode at MOD I with use of ae/ad/dme prn  (Progressing)       Start:  08/11/25    Expected End:  08/25/25            STG- patient will complete simple mobility at MOD I with use of ae/ad/dme prn  (Progressing)       Start:  08/11/25    Expected End:  08/25/25            STG- patient will complete grooming at MOD I with use of ae/ad/dme prn  (Progressing)       Start:  08/11/25    Expected End:  08/25/25

## 2025-08-11 NOTE — CARE PLAN
Problem: Pain - Adult  Goal: Verbalizes/displays adequate comfort level or baseline comfort level  Outcome: Progressing   The patient's goals for the shift include  Remain HDS    The clinical goals for the shift include Pt will be free from falls and and injuries throughout this shift    Pt alert and oriented. Pt denies pain. No s/s of respiratory distress. Bed locked and low. RN will continue to monitor.

## 2025-08-11 NOTE — PROGRESS NOTES
08/11/25 1204   Discharge Planning   Living Arrangements Spouse/significant other   Support Systems Spouse/significant other   Type of Residence Private residence   Do you have animals or pets at home? No   Who is requesting discharge planning? Provider   Expected Discharge Disposition Home   Stroke Family Assessment   Stroke Family Assessment Needed No   Intensity of Service   Intensity of Service >30 min     Met with pt this morning, pt admit for metabolic acidosis--AMS.  DC plan is for home , pt recently retired, has a lot of stress in life r/t his quadriplegic son whom he and his spouse care for.   Pt denies any needs for resources for alcohol.   Home address, insurance and PCP verified.   Tayla Lundberg RN TCC

## 2025-08-11 NOTE — PROGRESS NOTES
Physical Therapy    Physical Therapy Evaluation    Patient Name: Modesto Lorenz  MRN: [id number]  Today's Date: 8/11/2025   Time Calculation  Start Time: 0816  Stop Time: 0834  Time Calculation (min): 18 min  910/910-A    Assessment/Plan   PT Assessment  PT Assessment Results: Decreased mobility, Decreased safety awareness  Rehab Prognosis: Good  Barriers to Discharge Home: No anticipated barriers  Evaluation/Treatment Tolerance: Patient tolerated treatment well  End of Session Communication: Bedside nurse  Assessment Comment: Continued skilled PT intervention indicated to facilitate increased strength, balance & gait stability  End of Session Patient Position: Bed, 2 rail up, Alarm on (hob elevated to comfort, call light in reach, all needs met, scd's donned/activated)  IP OR SWING BED PT PLAN  Inpatient or Swing Bed: Inpatient  PT Plan  Treatment/Interventions: Bed mobility, Transfer training, Gait training, Stair training, Balance training, Therapeutic activity  PT Plan: Ongoing PT  PT Frequency: 2 times per week  PT Discharge Recommendations: No PT needed after discharge  PT Recommended Transfer Status: Assist x1  PT - OK to Discharge: Yes (when cleared by  medical team)    Subjective     Current Problem:  1. Metabolic acidosis        2. Hypoxia          Problem List[1]    General Visit Information:  General  Reason for Referral: PT eval & treat/impaired mobility DX: metabolic acidosis, lactic acidosis, falls; 8/9/25 found slumped over table w/ a bottle of liza next to him, covered in vomit & feces; imaging (-)  Referred By: Chacha  Caregiver Feedback: Per conference w/ RN patient stable to participate in therapy  Co-Treatment: OT  Co-Treatment Reason: to maximize patient safety & mobility  Patient Position Received: Bed, 2 rail up, Alarm off, not on at start of session  General Comment: Pleasant & cooperative, receptive to mobility& instructions    Home Living:  Home Living  Home Living Comments: lives w/ spouse  Lisa who is home & able to assist & adult son who is quadriplegic; 1step to enter house w/ 12 steps w/ rail to basement level workshop; sleeps in recliner on 1st fl; walk in shower w/ hand held shower hose & grab bar; raised toilet    Prior Level of Function:  Prior Function Per Pt/Caregiver Report  Prior Function Comments: independent mobility w/ use of cane prn, independent adl; iadl shared w/ spouse; both he & spouse assist in care of son; pt & spouse both drive    Precautions:  Precautions  Precautions Comment: fall, [place], CIWA     Objective     Pain:  Pain Assessment  Pain Assessment: 0-10  0-10 (Numeric) Pain Score: 0 - No pain    Cognition:  Cognition  Overall Cognitive Status: Within Functional Limits    General Assessments:      Activity Tolerance  Endurance: Endurance does not limit participation in activity  Sensation  Light Touch: No apparent deficits     Functional Assessments:     Bed Mobility  Bed Mobility:  (independent supine<>sit)  Transfers  Transfer:  (sba sit<>stand cues for safe pace & hand placement)  Ambulation/Gait Training  Ambulation/Gait Training Performed:  (sba w/ ww 100ft, cues for upright posture & safe position to ww, steady w/o loss of balance, flexed posture)   Extremity/Trunk Assessments:        RLE   RLE : Within Functional Limits  LLE   LLE : Within Functional Limits    Outcome Measures:     [facility] Basic Mobility  Turning from your back to your side while in a flat bed without using bedrails: None  Moving from lying on your back to sitting on the side of a flat bed without using bedrails: None  Moving to and from bed to chair (including a wheelchair): A little  Standing up from a chair using your arms (e.g. wheelchair or bedside chair): A little  To walk in hospital room: A little  Climbing 3-5 steps with railing: A little  Basic Mobility - Total Score: 20   Goals:  Encounter Problems       Encounter Problems (Active)       PT Problem       STG - Pt will transfer STS independently   (Progressing)       Start:  08/11/25    Expected End:  08/25/25            STG - Pt will amb >=150' using LRAD with modified independence  (Progressing)       Start:  08/11/25    Expected End:  08/25/25            STG -  Pt will navigate 4 stairs using rail with sba  (Progressing)       Start:  08/11/25    Expected End:  08/25/25            Patient will maintain standing supported dynamic balance during gait activity w/o loss of balance (Progressing)       Start:  08/11/25    Expected End:  08/25/25                 Education Documentation  Mobility Training, taught by Qi Abebe, PT at 8/11/2025 11:57 AM.  Learner: Patient  Readiness: Acceptance  Method: Explanation  Response: Verbalizes Understanding  Comment: safety, use of ww, activity progression              [1]   Patient Active Problem List  Diagnosis    Bilateral carotid artery stenosis    LUDA (acute kidney injury)    Aphasia    Altered mental status    Hyponatremia    Hyperkalemia    Dehydration    Hyperglycemia    Metabolic acidosis    Lactic acidosis    Falls

## 2025-08-11 NOTE — PROGRESS NOTES
Modesto Lorenz is a 74 y.o. male on day 1 of admission presenting with Metabolic acidosis.      Subjective   No events overnight. Patient seen and examined at bedside. He has no complaints. He is not certain why he is in the hospital. Presentation reviewed with patient. He is forthcoming about drinking liza.        Objective     Last Recorded Vitals  /73   Pulse 74   Temp 36.4 °C (97.5 °F) (Temporal)   Resp 18   Wt 82.6 kg (182 lb 1.6 oz)   SpO2 93%   Intake/Output last 3 Shifts:    Intake/Output Summary (Last 24 hours) at 8/10/2025 2040  Last data filed at 8/10/2025 1559  Gross per 24 hour   Intake 3242.5 ml   Output 1250 ml   Net 1992.5 ml       Admission Weight  Weight: 82.6 kg (182 lb 1.6 oz) (08/09/25 0837)    Daily Weight  08/09/25 : 82.6 kg (182 lb 1.6 oz)    Image Results  US right upper quadrant  Narrative: Interpreted By:  Angie Alex,   STUDY:  US RIGHT UPPER QUADRANT; 10:40 am      INDICATION:  Signs/Symptoms:periportal edema. Metabolic acidosis      COMPARISON:  None.      ACCESSION NUMBER(S):  [id number]      ORDERING CLINICIAN:  SHAYLA MALIK      TECHNIQUE:  Limited abdominal ultrasound of the right upper quadrant was  performed utilizing gray scale imaging.      FINDINGS:  Liver: Normal echogenicity without focal lesion. No intrahepatic  biliary distention. The liver is of normal-size. No nodularity of the  hepatic contour is appreciated. On this examination, Gallbladder:  Normal.  No gallstones, wall thickening, or pericholecystic fluid.  Sonographic Harris's sign: Negative Pancreas: The pancreatic head and  body appear unremarkable.  The distal pancreatic body and tail are  obscured by overlying bowel gas. CBD: 0.4 cm      Right kidney measures 9.7 cm in length and is unremarkable in  appearance.      No ascites within right upper quadrant is seen.      Impression: Normal exam of the right upper quadrant. No periportal edema is  identified.      MACRO:  None.      Signed by: Angie Alex  8/10/2025 10:43 AM  Dictation workstation:   [id number]  XR elbow right 3+ views  Narrative: Interpreted By:  German Marquez,   STUDY:  XR ELBOW RIGHT 3+ VIEWS; ;  8/9/2025 3:03 pm      INDICATION:  Signs/Symptoms:R arm pain, s/p fall.      COMPARISON:  None.      ACCESSION NUMBER(S):  [id number]      ORDERING CLINICIAN:  SHAYLA MALIK      TECHNIQUE:  2 views of the right elbow including AP and lateral views were  obtained.      FINDINGS:  There is no significant elbow joint effusion identified.  No  radiographic evidence of displaced fracture or dislocation is  identified.  The joint spaces are well preserved without significant  degenerative changes.      Impression: 1.  No displaced fracture or dislocation is identified.      MACRO:  None      Signed by: German Marquez 8/10/2025 10:35 AM  Dictation workstation:   [id number]  XR shoulder right 2+ views  Narrative: Interpreted By:  German Marquez,   STUDY:  XR SHOULDER RIGHT 2+ VIEWS 8/9/2025 3:03 pm      INDICATION:  Signs/Symptoms:R shoulder pain, s/p fall      COMPARISON:  None.      ACCESSION NUMBER(S):  [id number]      ORDERING CLINICIAN:  SHAYLA MALIK      TECHNIQUE:  2 views of the right shoulder including AP and scapular Y-views were  obtained.      FINDINGS:  There is no radiographic evidence of acute fracture or dislocation  identified.  There is severe narrowing of the right acromial humeral  space, which can be seen with chronic rotator cuff injury.  Mild-to-moderate hypertrophic degenerative changes are seen  throughout the right shoulder.      Impression: 1. No evidence of acute fracture or dislocation.  2. Degenerative changes, as described above.      MACRO:  None.      Signed by: German Marquez 8/10/2025 10:35 AM  Dictation workstation:   [id number]      Physical Exam  Constitutional:       Appearance: He is not ill-appearing or toxic-appearing.      Comments: Slurred speech, intoxication.   HENT:      Head: Normocephalic and atraumatic.      Nose:  Nose normal.      Mouth/Throat:      Mouth: Mucous membranes are moist.      Pharynx: Oropharynx is clear.      Eyes:      Extraocular Movements: Extraocular movements intact.      Conjunctiva/sclera: Conjunctivae normal.      Pupils: Pupils are equal, round, and reactive to light.         Cardiovascular:      Rate and Rhythm: Normal rate and regular rhythm.   Pulmonary:      Effort: Pulmonary effort is normal.      Breath sounds: Normal breath sounds. No wheezing or rales.   Abdominal:      General: Abdomen is flat. There is distension.      Palpations: Abdomen is soft.      Tenderness: There is no abdominal tenderness.      Musculoskeletal:         General: Normal range of motion.      Cervical back: Normal range of motion.      Right lower leg: No edema.      Left lower leg: No edema.      Skin:     General: Skin is warm and dry.      Findings: Bruising present.      Neurological:      Mental Status: He is disoriented.     Relevant Results             This patient currently has cardiac telemetry ordered; if you would like to modify or discontinue the telemetry order, click here to go to the orders activity to modify/discontinue the order.              Assessment & Plan  Metabolic acidosis    Lactic acidosis    Falls    Altered mental status  Alcohol use (alc level 251)  Lactic acidosis  Nonspecific periportal edema on CT imaging  Hx falls  Hx CVA  B/L carotid artery stenosis  - Patient presented to the emergency department after EMS found him slumped over a table with a bottle of liza next to him, covered in feces/vomit  - Alcohol level 251 on arrival, lactate 3.7 (from VBG), patient given 1 L LR bolus, repeat lactate 4.0.  Additional liter of fluid given, repeat lactate pending  - Initial VBG shows pH of 7.25, pCO2 was normal, HCO3 is 18.9, and lactate 3.7.  Repeat VBG shows pH 7.23, HCO3 is 20.5. Additional VBG added on to trend pH  - Imaging results as above, no infectious process noted, no white count,  patient afebrile  - Urinalysis negative for signs of infection  - Add on CIWA protocol  - PT/OT, SW/TCC - patient's wife would like information about resources to help her  with alcohol  - Add on BNP, RUQ US, consider addition of echo  - Fall precautions     First-degree AV block  - EKG, per ED physician, rate of 67 bpm, significantly prolonged ME interval constituting a first-degree AV block, otherwise a right bundle branch block and left anterior fascicular block are present, left axis deviation consistent with this, otherwise normal ST segments and T waves     Type 2 diabetes mellitus  - Per EMS, patient's glucose was >500, though 253 here in the ED. Patient is acidotic, though without an anion gap and no ketones present in urine  - Patient on 500 mg metformin 2 times daily and glimepiride at home  - SSI, hypoglycemic protocol, diabetic diet  - Hemoglobin A1c as of 2 months ago was 11.8%     DVT prophylaxis  -SCDs     8/10: Replete magnesium. Check phosphorus today. Check B12 and folate with morning labs. Continue current management. Advised alcohol cessation.            Nirmal Villalobos, DO

## 2025-08-11 NOTE — CARE PLAN
The patient's goals for the shift include      The clinical goals for the shift include Pt will be free from falls and and injuries throughout this shift      Problem: Pain - Adult  Goal: Verbalizes/displays adequate comfort level or baseline comfort level  Outcome: Progressing     Problem: Safety - Adult  Goal: Free from fall injury  Outcome: Progressing     Problem: Discharge Planning  Goal: Discharge to home or other facility with appropriate resources  Outcome: Progressing     Problem: Chronic Conditions and Co-morbidities  Goal: Patient's chronic conditions and co-morbidity symptoms are monitored and maintained or improved  Outcome: Progressing     Problem: Nutrition  Goal: Nutrient intake appropriate for maintaining nutritional needs  Outcome: Progressing     Problem: Skin  Goal: Decreased wound size/increased tissue granulation at next dressing change  Outcome: Progressing  Goal: Participates in plan/prevention/treatment measures  Outcome: Progressing  Goal: Prevent/manage excess moisture  Outcome: Progressing  Goal: Prevent/minimize sheer/friction injuries  Outcome: Progressing  Goal: Promote/optimize nutrition  Outcome: Progressing  Goal: Promote skin healing  Outcome: Progressing

## 2025-08-12 LAB — HOLD SPECIMEN: NORMAL

## 2025-08-13 LAB
ATRIAL RATE: 66 BPM
P AXIS: -87 DEGREES
PR INTERVAL: 378 MS
Q ONSET: 251 MS
QRS COUNT: 11 BEATS
QRS DURATION: 157 MS
QT INTERVAL: 447 MS
QTC CALCULATION(BAZETT): 472 MS
QTC FREDERICIA: 464 MS
R AXIS: -69 DEGREES
T AXIS: 42 DEGREES
T OFFSET: 475 MS
VENTRICULAR RATE: 67 BPM